# Patient Record
Sex: FEMALE | Race: BLACK OR AFRICAN AMERICAN | Employment: OTHER
[De-identification: names, ages, dates, MRNs, and addresses within clinical notes are randomized per-mention and may not be internally consistent; named-entity substitution may affect disease eponyms.]

---

## 2017-01-30 ENCOUNTER — OFFICE VISIT (OUTPATIENT)
Dept: FAMILY MEDICINE CLINIC | Facility: CLINIC | Age: 57
End: 2017-01-30

## 2017-01-30 VITALS
DIASTOLIC BLOOD PRESSURE: 56 MMHG | HEART RATE: 69 BPM | WEIGHT: 242 LBS | BODY MASS INDEX: 40.32 KG/M2 | SYSTOLIC BLOOD PRESSURE: 108 MMHG | HEIGHT: 65 IN

## 2017-01-30 DIAGNOSIS — E78.2 MIXED HYPERLIPIDEMIA: ICD-10-CM

## 2017-01-30 DIAGNOSIS — G45.3 AMAUROSIS FUGAX OF LEFT EYE: ICD-10-CM

## 2017-01-30 DIAGNOSIS — Z09 HOSPITAL DISCHARGE FOLLOW-UP: ICD-10-CM

## 2017-01-30 DIAGNOSIS — I10 UNCONTROLLED HYPERTENSION: Primary | ICD-10-CM

## 2017-01-30 DIAGNOSIS — E66.09 NON MORBID OBESITY DUE TO EXCESS CALORIES: ICD-10-CM

## 2017-01-30 DIAGNOSIS — K80.20 CALCULUS OF GALLBLADDER WITHOUT CHOLECYSTITIS WITHOUT OBSTRUCTION: ICD-10-CM

## 2017-01-30 DIAGNOSIS — N28.9 RENAL DYSFUNCTION: ICD-10-CM

## 2017-01-30 DIAGNOSIS — G81.94 LEFT HEMIPARESIS (HCC): ICD-10-CM

## 2017-01-30 DIAGNOSIS — R10.84 GENERALIZED ABDOMINAL PAIN: ICD-10-CM

## 2017-01-30 PROCEDURE — 99214 OFFICE O/P EST MOD 30 MIN: CPT | Performed by: FAMILY MEDICINE

## 2017-01-30 RX ORDER — CARVEDILOL 25 MG/1
25 TABLET ORAL
COMMUNITY
Start: 2017-01-25 | End: 2017-05-16 | Stop reason: ALTCHOICE

## 2017-01-30 RX ORDER — HYDROCODONE BITARTRATE AND ACETAMINOPHEN 5; 325 MG/1; MG/1
1 TABLET ORAL
COMMUNITY
Start: 2017-01-25 | End: 2017-02-04

## 2017-01-30 RX ORDER — ONDANSETRON 4 MG/1
4 TABLET, FILM COATED ORAL
COMMUNITY
Start: 2017-01-25 | End: 2017-02-24

## 2017-01-30 ASSESSMENT — PATIENT HEALTH QUESTIONNAIRE - PHQ9
SUM OF ALL RESPONSES TO PHQ9 QUESTIONS 1 & 2: 0
2. FEELING DOWN, DEPRESSED OR HOPELESS: 0
SUM OF ALL RESPONSES TO PHQ QUESTIONS 1-9: 0
1. LITTLE INTEREST OR PLEASURE IN DOING THINGS: 0

## 2017-02-26 ASSESSMENT — ENCOUNTER SYMPTOMS
CHEST TIGHTNESS: 0
SHORTNESS OF BREATH: 0
TROUBLE SWALLOWING: 0
RHINORRHEA: 0
COUGH: 0
DIARRHEA: 0
SORE THROAT: 0
NAUSEA: 0
CONSTIPATION: 0
BACK PAIN: 0
ABDOMINAL PAIN: 1

## 2017-03-30 DIAGNOSIS — M17.0 PRIMARY OSTEOARTHRITIS OF BOTH KNEES: ICD-10-CM

## 2017-03-31 RX ORDER — MELOXICAM 15 MG/1
TABLET ORAL
Qty: 90 TABLET | Refills: 0 | Status: SHIPPED | OUTPATIENT
Start: 2017-03-31 | End: 2018-03-01 | Stop reason: ALTCHOICE

## 2017-05-16 ENCOUNTER — OFFICE VISIT (OUTPATIENT)
Dept: FAMILY MEDICINE CLINIC | Age: 57
End: 2017-05-16
Payer: MEDICAID

## 2017-05-16 VITALS
SYSTOLIC BLOOD PRESSURE: 130 MMHG | DIASTOLIC BLOOD PRESSURE: 70 MMHG | OXYGEN SATURATION: 97 % | BODY MASS INDEX: 37.96 KG/M2 | HEIGHT: 66 IN | WEIGHT: 236.2 LBS | HEART RATE: 77 BPM

## 2017-05-16 DIAGNOSIS — I89.0 LYMPHEDEMA OF BOTH LOWER EXTREMITIES: ICD-10-CM

## 2017-05-16 DIAGNOSIS — R06.09 DYSPNEA ON EXERTION: Primary | ICD-10-CM

## 2017-05-16 DIAGNOSIS — E66.01 MORBID OBESITY DUE TO EXCESS CALORIES (HCC): ICD-10-CM

## 2017-05-16 DIAGNOSIS — R63.5 WEIGHT GAIN: ICD-10-CM

## 2017-05-16 DIAGNOSIS — M17.0 PRIMARY OSTEOARTHRITIS OF BOTH KNEES: ICD-10-CM

## 2017-05-16 DIAGNOSIS — K21.00 GASTROESOPHAGEAL REFLUX DISEASE WITH ESOPHAGITIS: ICD-10-CM

## 2017-05-16 DIAGNOSIS — Z09 HOSPITAL DISCHARGE FOLLOW-UP: ICD-10-CM

## 2017-05-16 DIAGNOSIS — I10 ESSENTIAL HYPERTENSION: ICD-10-CM

## 2017-05-16 DIAGNOSIS — E78.2 MIXED HYPERLIPIDEMIA: ICD-10-CM

## 2017-05-16 LAB — HBA1C MFR BLD: 8.5 %

## 2017-05-16 PROCEDURE — 83036 HEMOGLOBIN GLYCOSYLATED A1C: CPT | Performed by: FAMILY MEDICINE

## 2017-05-16 PROCEDURE — 99214 OFFICE O/P EST MOD 30 MIN: CPT | Performed by: FAMILY MEDICINE

## 2017-05-16 RX ORDER — CARVEDILOL 6.25 MG/1
12.5 TABLET ORAL 2 TIMES DAILY WITH MEALS
COMMUNITY
End: 2022-04-28 | Stop reason: ALTCHOICE

## 2017-05-17 ASSESSMENT — ENCOUNTER SYMPTOMS
CHEST TIGHTNESS: 0
DIARRHEA: 0
NAUSEA: 0
DIFFICULTY BREATHING: 1
ABDOMINAL PAIN: 0
CONSTIPATION: 0
TROUBLE SWALLOWING: 0
COUGH: 1
SORE THROAT: 0
SHORTNESS OF BREATH: 1
RHINORRHEA: 0
BACK PAIN: 0

## 2017-05-18 DIAGNOSIS — M17.0 PRIMARY OSTEOARTHRITIS OF BOTH KNEES: ICD-10-CM

## 2017-05-18 DIAGNOSIS — R26.81 UNSTABLE GAIT: ICD-10-CM

## 2017-05-18 DIAGNOSIS — I89.0 LYMPHEDEMA OF BOTH LOWER EXTREMITIES: Primary | ICD-10-CM

## 2017-08-03 ENCOUNTER — OFFICE VISIT (OUTPATIENT)
Dept: FAMILY MEDICINE CLINIC | Age: 57
End: 2017-08-03
Payer: MEDICARE

## 2017-08-03 ENCOUNTER — TELEPHONE (OUTPATIENT)
Dept: INTERNAL MEDICINE | Age: 57
End: 2017-08-03

## 2017-08-03 VITALS
BODY MASS INDEX: 44.2 KG/M2 | SYSTOLIC BLOOD PRESSURE: 127 MMHG | HEIGHT: 66 IN | HEART RATE: 66 BPM | WEIGHT: 275 LBS | DIASTOLIC BLOOD PRESSURE: 66 MMHG

## 2017-08-03 DIAGNOSIS — I63.9 OCCIPITAL CEREBRAL INFARCTION (HCC): ICD-10-CM

## 2017-08-03 DIAGNOSIS — N28.9 RENAL INSUFFICIENCY: ICD-10-CM

## 2017-08-03 DIAGNOSIS — E78.2 MIXED HYPERLIPIDEMIA: ICD-10-CM

## 2017-08-03 DIAGNOSIS — G63 POLYNEUROPATHY ASSOCIATED WITH UNDERLYING DISEASE (HCC): ICD-10-CM

## 2017-08-03 DIAGNOSIS — R26.81 UNSTABLE GAIT: ICD-10-CM

## 2017-08-03 DIAGNOSIS — K21.00 GASTROESOPHAGEAL REFLUX DISEASE WITH ESOPHAGITIS: ICD-10-CM

## 2017-08-03 DIAGNOSIS — I89.0 LYMPHEDEMA OF BOTH LOWER EXTREMITIES: ICD-10-CM

## 2017-08-03 DIAGNOSIS — I10 ESSENTIAL HYPERTENSION: ICD-10-CM

## 2017-08-03 DIAGNOSIS — E10.3299 TYPE 1 DIABETES MELLITUS WITH MILD NONPROLIFERATIVE RETINOPATHY, MACULAR EDEMA PRESENCE UNSPECIFIED, UNSPECIFIED LATERALITY (HCC): Primary | ICD-10-CM

## 2017-08-03 DIAGNOSIS — R63.5 WEIGHT GAIN: ICD-10-CM

## 2017-08-03 LAB
GLUCOSE BLD-MCNC: 204 MG/DL
HBA1C MFR BLD: 10 %

## 2017-08-03 PROCEDURE — 83036 HEMOGLOBIN GLYCOSYLATED A1C: CPT | Performed by: FAMILY MEDICINE

## 2017-08-03 PROCEDURE — 99214 OFFICE O/P EST MOD 30 MIN: CPT | Performed by: FAMILY MEDICINE

## 2017-08-03 PROCEDURE — 82962 GLUCOSE BLOOD TEST: CPT | Performed by: FAMILY MEDICINE

## 2017-08-03 RX ORDER — PREDNISONE 10 MG/1
TABLET ORAL
COMMUNITY
Start: 2017-07-27 | End: 2017-09-06 | Stop reason: ALTCHOICE

## 2017-08-03 ASSESSMENT — ENCOUNTER SYMPTOMS
TROUBLE SWALLOWING: 0
NAUSEA: 0
SORE THROAT: 0
BACK PAIN: 1
DIARRHEA: 0
CONSTIPATION: 0
COUGH: 0
RHINORRHEA: 0
CHEST TIGHTNESS: 0
ABDOMINAL PAIN: 0
SHORTNESS OF BREATH: 1

## 2017-08-15 DIAGNOSIS — I89.0 LYMPHEDEMA OF BOTH LOWER EXTREMITIES: ICD-10-CM

## 2017-08-15 DIAGNOSIS — M17.0 PRIMARY OSTEOARTHRITIS OF BOTH KNEES: ICD-10-CM

## 2017-08-15 DIAGNOSIS — R26.81 UNSTABLE GAIT: ICD-10-CM

## 2017-08-24 RX ORDER — NIFEDIPINE 60 MG/1
TABLET, EXTENDED RELEASE ORAL
Qty: 60 TABLET | Refills: 0 | Status: SHIPPED | OUTPATIENT
Start: 2017-08-24 | End: 2018-10-10 | Stop reason: ALTCHOICE

## 2017-08-24 RX ORDER — ATORVASTATIN CALCIUM 80 MG/1
TABLET, FILM COATED ORAL
Qty: 30 TABLET | Refills: 0 | Status: SHIPPED | OUTPATIENT
Start: 2017-08-24 | End: 2017-09-21 | Stop reason: SDUPTHER

## 2017-09-05 DIAGNOSIS — I10 UNCONTROLLED HYPERTENSION: ICD-10-CM

## 2017-09-05 RX ORDER — LISINOPRIL 40 MG/1
40 TABLET ORAL DAILY
Qty: 30 TABLET | Refills: 3 | Status: SHIPPED | OUTPATIENT
Start: 2017-09-05 | End: 2017-09-06 | Stop reason: ALTCHOICE

## 2017-09-06 ENCOUNTER — OFFICE VISIT (OUTPATIENT)
Dept: FAMILY MEDICINE CLINIC | Age: 57
End: 2017-09-06
Payer: MEDICARE

## 2017-09-06 VITALS
BODY MASS INDEX: 42.59 KG/M2 | HEART RATE: 69 BPM | DIASTOLIC BLOOD PRESSURE: 74 MMHG | WEIGHT: 255.6 LBS | SYSTOLIC BLOOD PRESSURE: 132 MMHG | HEIGHT: 65 IN

## 2017-09-06 DIAGNOSIS — M17.0 PRIMARY OSTEOARTHRITIS OF BOTH KNEES: ICD-10-CM

## 2017-09-06 DIAGNOSIS — I63.9 OCCIPITAL CEREBRAL INFARCTION (HCC): ICD-10-CM

## 2017-09-06 DIAGNOSIS — E78.2 MIXED HYPERLIPIDEMIA: ICD-10-CM

## 2017-09-06 DIAGNOSIS — E66.01 MORBID (SEVERE) OBESITY DUE TO EXCESS CALORIES (HCC): ICD-10-CM

## 2017-09-06 DIAGNOSIS — Z79.4 TYPE 2 DIABETES MELLITUS WITH MILD NONPROLIFERATIVE RETINOPATHY OF BOTH EYES, WITH LONG-TERM CURRENT USE OF INSULIN, MACULAR EDEMA PRESENCE UNSPECIFIED (HCC): Primary | ICD-10-CM

## 2017-09-06 DIAGNOSIS — E11.3293 TYPE 2 DIABETES MELLITUS WITH MILD NONPROLIFERATIVE RETINOPATHY OF BOTH EYES, WITH LONG-TERM CURRENT USE OF INSULIN, MACULAR EDEMA PRESENCE UNSPECIFIED (HCC): Primary | ICD-10-CM

## 2017-09-06 DIAGNOSIS — I89.0 LYMPHEDEMA OF BOTH LOWER EXTREMITIES: ICD-10-CM

## 2017-09-06 DIAGNOSIS — E11.42 POLYNEUROPATHY DUE TO TYPE 2 DIABETES MELLITUS (HCC): ICD-10-CM

## 2017-09-06 DIAGNOSIS — N28.9 RENAL INSUFFICIENCY: ICD-10-CM

## 2017-09-06 DIAGNOSIS — I10 ESSENTIAL HYPERTENSION: ICD-10-CM

## 2017-09-06 PROBLEM — Z01.810 ENCOUNTER FOR PREPROCEDURAL CARDIOVASCULAR EXAMINATION: Status: ACTIVE | Noted: 2017-02-20

## 2017-09-06 PROBLEM — R06.02 BREATH SHORTNESS: Status: ACTIVE | Noted: 2017-02-21

## 2017-09-06 PROBLEM — E11.9 TYPE 2 DIABETES MELLITUS WITHOUT COMPLICATION (HCC): Status: ACTIVE | Noted: 2017-05-07

## 2017-09-06 PROBLEM — I67.82 TEMPORARY CEREBRAL VASCULAR DYSFUNCTION: Status: ACTIVE | Noted: 2017-07-19

## 2017-09-06 PROBLEM — R73.9 BLOOD GLUCOSE ELEVATED: Status: ACTIVE | Noted: 2017-05-07

## 2017-09-06 PROBLEM — R53.1 WEAKNESS OF LEFT SIDE OF BODY: Status: ACTIVE | Noted: 2017-01-30

## 2017-09-06 PROBLEM — B00.9 HERPES SIMPLEX TYPE 2 INFECTION: Status: ACTIVE | Noted: 2017-08-15

## 2017-09-06 PROBLEM — N18.9 CHRONIC KIDNEY DISEASE: Status: ACTIVE | Noted: 2017-08-03

## 2017-09-06 PROBLEM — R47.01: Status: ACTIVE | Noted: 2017-05-07

## 2017-09-06 PROBLEM — A59.01 VAGINAL TRICHOMONIASIS: Status: ACTIVE | Noted: 2017-08-15

## 2017-09-06 PROBLEM — Z01.419 ENCOUNTER FOR GYNECOLOGICAL EXAMINATION WITHOUT ABNORMAL FINDING: Status: ACTIVE | Noted: 2017-08-15

## 2017-09-06 PROBLEM — I11.9 BENIGN HYPERTENSIVE HEART DISEASE: Status: ACTIVE | Noted: 2017-08-15

## 2017-09-06 PROBLEM — R74.8 ABNORMAL LIVER ENZYMES: Status: ACTIVE | Noted: 2017-08-16

## 2017-09-06 PROBLEM — R55 EPISODE OF SYNCOPE: Status: ACTIVE | Noted: 2017-01-22

## 2017-09-06 PROCEDURE — 99214 OFFICE O/P EST MOD 30 MIN: CPT | Performed by: FAMILY MEDICINE

## 2017-09-06 RX ORDER — ERGOCALCIFEROL 1.25 MG/1
50000 CAPSULE ORAL WEEKLY
COMMUNITY
Start: 2017-08-25 | End: 2021-02-10

## 2017-09-06 RX ORDER — NIFEDIPINE 90 MG/1
90 TABLET, EXTENDED RELEASE ORAL
COMMUNITY
End: 2017-09-06 | Stop reason: ALTCHOICE

## 2017-09-06 RX ORDER — FUROSEMIDE 20 MG/1
TABLET ORAL
COMMUNITY
Start: 2017-08-24 | End: 2018-03-01 | Stop reason: DRUGHIGH

## 2017-09-06 RX ORDER — LANOLIN ALCOHOL/MO/W.PET/CERES
CREAM (GRAM) TOPICAL
COMMUNITY
Start: 2017-08-24 | End: 2017-12-07 | Stop reason: SDUPTHER

## 2017-09-06 RX ORDER — SPIRONOLACTONE 25 MG/1
25 TABLET ORAL DAILY
COMMUNITY
Start: 2017-08-24 | End: 2021-06-03 | Stop reason: ALTCHOICE

## 2017-09-06 RX ORDER — LISINOPRIL 40 MG/1
40 TABLET ORAL DAILY
COMMUNITY
End: 2021-02-10

## 2017-09-06 RX ORDER — FAMOTIDINE 40 MG/1
TABLET, FILM COATED ORAL
COMMUNITY
Start: 2017-08-24 | End: 2018-10-10 | Stop reason: DRUGHIGH

## 2017-09-13 PROBLEM — I63.9 OCCIPITAL CEREBRAL INFARCTION (HCC): Status: ACTIVE | Noted: 2017-09-13

## 2017-09-13 PROBLEM — E11.3293 TYPE 2 DIABETES MELLITUS WITH MILD NONPROLIFERATIVE RETINOPATHY OF BOTH EYES, WITH LONG-TERM CURRENT USE OF INSULIN (HCC): Status: ACTIVE | Noted: 2017-09-13

## 2017-09-13 PROBLEM — N28.9 RENAL INSUFFICIENCY: Status: ACTIVE | Noted: 2017-09-13

## 2017-09-13 PROBLEM — E11.42 POLYNEUROPATHY DUE TO TYPE 2 DIABETES MELLITUS (HCC): Status: ACTIVE | Noted: 2017-09-13

## 2017-09-13 PROBLEM — Z79.4 TYPE 2 DIABETES MELLITUS WITH MILD NONPROLIFERATIVE RETINOPATHY OF BOTH EYES, WITH LONG-TERM CURRENT USE OF INSULIN (HCC): Status: ACTIVE | Noted: 2017-09-13

## 2017-09-13 ASSESSMENT — ENCOUNTER SYMPTOMS
SORE THROAT: 0
BACK PAIN: 0
RHINORRHEA: 0
NAUSEA: 0
COUGH: 0
ABDOMINAL PAIN: 0
DIARRHEA: 0
CONSTIPATION: 0
CHEST TIGHTNESS: 0
TROUBLE SWALLOWING: 0
SHORTNESS OF BREATH: 1

## 2017-09-21 RX ORDER — ATORVASTATIN CALCIUM 80 MG/1
TABLET, FILM COATED ORAL
Qty: 30 TABLET | Refills: 0 | Status: SHIPPED | OUTPATIENT
Start: 2017-09-21 | End: 2017-10-26 | Stop reason: SDUPTHER

## 2017-10-26 DIAGNOSIS — I10 UNCONTROLLED HYPERTENSION: ICD-10-CM

## 2017-10-26 RX ORDER — CLONIDINE HYDROCHLORIDE 0.3 MG/1
TABLET ORAL
Qty: 60 TABLET | Refills: 1 | Status: SHIPPED | OUTPATIENT
Start: 2017-10-26 | End: 2017-12-22 | Stop reason: SDUPTHER

## 2017-10-26 RX ORDER — ATORVASTATIN CALCIUM 80 MG/1
TABLET, FILM COATED ORAL
Qty: 30 TABLET | Refills: 0 | Status: SHIPPED | OUTPATIENT
Start: 2017-10-26 | End: 2017-11-24 | Stop reason: SDUPTHER

## 2017-10-26 NOTE — TELEPHONE ENCOUNTER
Requested Prescriptions     Pending Prescriptions Disp Refills    cloNIDine (CATAPRES) 0.3 MG tablet [Pharmacy Med Name: CLONIDINE HCL 0.3 MG TABLET] 60 tablet 1     Sig: TAKE 1 TABLET 2 TIMES A DAY     Next Visit Date:  Future Appointments  Date Time Provider Jack Shah   12/6/2017 11:00 AM Christine Falk MD [address] Maintenance   Topic Date Due    Colon cancer screen colonoscopy  09/16/2010    Cervical cancer screen  12/01/2016    Diabetic foot exam  03/29/2017    Diabetic microalbuminuria test  03/29/2017    Diabetic hemoglobin A1C test  11/03/2017    Lipid screen  05/16/2018 (Originally 3/29/2017)    DTaP/Tdap/Td vaccine (1 - Tdap) 05/16/2018 (Originally 9/16/1979)    Hepatitis C screen  05/16/2018 (Originally 1960)    HIV screen  05/16/2018 (Originally 9/16/1975)    Flu vaccine (1) 08/03/2018 (Originally 9/1/2017)    Breast cancer screen  03/29/2018    Diabetic retinal exam  09/19/2018    Pneumococcal med risk  Completed       Hemoglobin A1C (%)   Date Value   08/03/2017 10.0 (H)   05/16/2017 8.5   11/28/2016 10.6             ( goal A1C is < 7)   Microalb/Crt.  Ratio (mcg/mg creat)   Date Value   04/04/2015 3,460     LDL Cholesterol (mg/dL)   Date Value   04/04/2015            (goal LDL is <100)   AST (U/L)   Date Value   04/04/2015 19     ALT (U/L)   Date Value   04/04/2015 24     BUN (mg/dL)   Date Value   04/04/2015 14     BP Readings from Last 3 Encounters:   09/06/17 132/74   08/03/17 127/66   05/16/17 130/70          (goal 120/80)    All Future Testing planned in [facility]  Lab Frequency Next Occurrence               Patient Active Problem List:     Malignant neoplasm of uterus (HCC)     Acid reflux     Heart murmur     Herpesvirus infection     Adiposity     Type 2 diabetes mellitus treated with insulin (HCC)     Microalbuminuria     Abnormal ECG     Tendinitis of wrist     Noncompliance with treatment     Osteoarthritis of knee     Hypokalemia

## 2017-11-24 NOTE — TELEPHONE ENCOUNTER
Impaired vision in both eyes     Lymphedema of leg     Episode of hypertension     Primary osteoarthritis of both knees     Bad memory     Combined fat and carbohydrate induced hyperlipemia     Unsteady gait     Abnormal kidney function     Calculus of gallbladder     Weakness of left side of body     AF (amaurosis fugax)     Morbid (severe) obesity due to excess calories ([address]     Encounter for follow-up examination after completed treatment for conditions other than malignant neoplasm     Weight gain     Chronic kidney disease     Polyneuropathy associated with underlying disease (HCC)     Essential hypertension, malignant     Benign hypertensive heart disease     Abnormal liver enzymes     Encounter for gynecological examination without abnormal finding     Encounter for preprocedural cardiovascular examination     Ataxic aphasia     Herpes simplex type 2 infection     Blood glucose elevated     Accelerated hypertension     Breath shortness     Episode of syncope     Temporary cerebral vascular dysfunction     Moderate or severe vision impairment, both eyes     Vaginal trichomoniasis     Mixed hyperlipidemia     Essential hypertension     Type 2 diabetes mellitus with mild nonproliferative retinopathy of both eyes, with long-term current use of insulin (HCC)     Renal insufficiency     Occipital cerebral infarction ([address]     Polyneuropathy due to type 2 diabetes mellitus ([address]

## 2017-11-29 RX ORDER — ATORVASTATIN CALCIUM 80 MG/1
TABLET, FILM COATED ORAL
Qty: 30 TABLET | Refills: 0 | Status: SHIPPED | OUTPATIENT
Start: 2017-11-29 | End: 2018-01-01 | Stop reason: SDUPTHER

## 2017-12-07 ENCOUNTER — OFFICE VISIT (OUTPATIENT)
Dept: FAMILY MEDICINE CLINIC | Age: 57
End: 2017-12-07
Payer: MEDICARE

## 2017-12-07 VITALS
DIASTOLIC BLOOD PRESSURE: 81 MMHG | HEIGHT: 65 IN | BODY MASS INDEX: 44.89 KG/M2 | HEART RATE: 69 BPM | SYSTOLIC BLOOD PRESSURE: 150 MMHG | WEIGHT: 269.4 LBS

## 2017-12-07 DIAGNOSIS — Z79.4 TYPE 2 DIABETES MELLITUS TREATED WITH INSULIN (HCC): Primary | ICD-10-CM

## 2017-12-07 DIAGNOSIS — E11.9 TYPE 2 DIABETES MELLITUS TREATED WITH INSULIN (HCC): Primary | ICD-10-CM

## 2017-12-07 DIAGNOSIS — I10 ESSENTIAL HYPERTENSION: ICD-10-CM

## 2017-12-07 DIAGNOSIS — E66.01 MORBID (SEVERE) OBESITY DUE TO EXCESS CALORIES (HCC): ICD-10-CM

## 2017-12-07 DIAGNOSIS — R63.5 WEIGHT GAIN: ICD-10-CM

## 2017-12-07 DIAGNOSIS — M17.0 PRIMARY OSTEOARTHRITIS OF BOTH KNEES: ICD-10-CM

## 2017-12-07 DIAGNOSIS — R26.81 UNSTEADY GAIT: ICD-10-CM

## 2017-12-07 DIAGNOSIS — N28.9 RENAL INSUFFICIENCY: ICD-10-CM

## 2017-12-07 DIAGNOSIS — I63.9 OCCIPITAL CEREBRAL INFARCTION (HCC): ICD-10-CM

## 2017-12-07 DIAGNOSIS — E78.2 MIXED HYPERLIPIDEMIA: ICD-10-CM

## 2017-12-07 DIAGNOSIS — K21.00 GASTROESOPHAGEAL REFLUX DISEASE WITH ESOPHAGITIS: ICD-10-CM

## 2017-12-07 PROBLEM — G47.10 HYPERSOMNOLENCE: Status: ACTIVE | Noted: 2017-09-13

## 2017-12-07 PROBLEM — T50.901A DRUG OVERDOSE: Status: ACTIVE | Noted: 2017-10-04

## 2017-12-07 LAB — HBA1C MFR BLD: 10.4 %

## 2017-12-07 PROCEDURE — 1036F TOBACCO NON-USER: CPT | Performed by: FAMILY MEDICINE

## 2017-12-07 PROCEDURE — 3017F COLORECTAL CA SCREEN DOC REV: CPT | Performed by: FAMILY MEDICINE

## 2017-12-07 PROCEDURE — G8427 DOCREV CUR MEDS BY ELIG CLIN: HCPCS | Performed by: FAMILY MEDICINE

## 2017-12-07 PROCEDURE — G8417 CALC BMI ABV UP PARAM F/U: HCPCS | Performed by: FAMILY MEDICINE

## 2017-12-07 PROCEDURE — 3014F SCREEN MAMMO DOC REV: CPT | Performed by: FAMILY MEDICINE

## 2017-12-07 PROCEDURE — 99214 OFFICE O/P EST MOD 30 MIN: CPT | Performed by: FAMILY MEDICINE

## 2017-12-07 PROCEDURE — G8484 FLU IMMUNIZE NO ADMIN: HCPCS | Performed by: FAMILY MEDICINE

## 2017-12-07 PROCEDURE — 83036 HEMOGLOBIN GLYCOSYLATED A1C: CPT | Performed by: FAMILY MEDICINE

## 2017-12-07 PROCEDURE — 3046F HEMOGLOBIN A1C LEVEL >9.0%: CPT | Performed by: FAMILY MEDICINE

## 2017-12-07 PROCEDURE — G8598 ASA/ANTIPLAT THER USED: HCPCS | Performed by: FAMILY MEDICINE

## 2017-12-07 RX ORDER — TIMOLOL MALEATE 5 MG/ML
SOLUTION/ DROPS OPHTHALMIC
COMMUNITY
Start: 2017-12-04 | End: 2018-10-15

## 2017-12-07 RX ORDER — MAGNESIUM OXIDE 400 MG/1
400 TABLET ORAL
COMMUNITY
Start: 2017-08-24 | End: 2018-03-01 | Stop reason: SDUPTHER

## 2017-12-07 RX ORDER — LATANOPROST 50 UG/ML
1 SOLUTION/ DROPS OPHTHALMIC DAILY
COMMUNITY
Start: 2017-12-04

## 2017-12-07 ASSESSMENT — ENCOUNTER SYMPTOMS
CONSTIPATION: 0
CHEST TIGHTNESS: 0
SHORTNESS OF BREATH: 0
SORE THROAT: 0
ABDOMINAL PAIN: 0
BACK PAIN: 1
DIARRHEA: 0
RHINORRHEA: 0
COUGH: 0
NAUSEA: 0
TROUBLE SWALLOWING: 0

## 2017-12-07 NOTE — PROGRESS NOTES
questions answered. Pt voiced understanding. 5.  Reviewed prior labs and health maintenance  6. Continue current medications, diet and exercise. More than 50% of the 25 minutes was spent in counseling patient and evaluating plan of treatment.     Completed Refills   Requested Prescriptions      No prescriptions requested or ordered in this encounter     Electronically signed by Christine Falk MD on 12/7/2017 at 1:09 PM

## 2017-12-07 NOTE — PATIENT INSTRUCTIONS
you are having a problem with your medicine. You will get more details on the specific medicines your doctor prescribes. · Check your blood sugar as often as your doctor recommends. It is important to keep track of any symptoms you have, such as low blood sugar. Also tell your doctor if you have any changes in your activities, diet, or insulin use. · Talk to your doctor before you start taking aspirin every day. Aspirin can help certain people lower their risk of a heart attack or stroke. But taking aspirin isn't right for everyone, because it can cause serious bleeding. · Do not smoke. If you need help quitting, talk to your doctor about stop-smoking programs and medicines. These can increase your chances of quitting for good. · Keep your cholesterol and blood pressure at normal levels. You may need to take one or more medicines to reach your goals. Take them exactly as directed. Do not stop or change a medicine without talking to your doctor first.  When should you call for help? Call 911 anytime you think you may need emergency care. For example, call if:  ? · You passed out (lost consciousness), or you suddenly become very sleepy or confused. (You may have very low blood sugar.)   ? Call your doctor now or seek immediate medical care if:  ? · Your blood sugar is 300 mg/dL or is higher than the level your doctor has set for you. ? · You have symptoms of low blood sugar, such as:  ¨ Sweating. ¨ Feeling nervous, shaky, and weak. ¨ Extreme hunger and slight nausea. ¨ Dizziness and headache. ¨ Blurred vision. ¨ Confusion. ? Watch closely for changes in your health, and be sure to contact your doctor if:  ? · You often have problems controlling your blood sugar. ? · You have symptoms of long-term diabetes problems, such as:  ¨ New vision changes. ¨ New pain, numbness, or tingling in your hands or feet. ¨ Skin problems. Where can you learn more? Go to [email or web address] and sign

## 2017-12-22 DIAGNOSIS — I10 UNCONTROLLED HYPERTENSION: ICD-10-CM

## 2017-12-22 RX ORDER — CLONIDINE HYDROCHLORIDE 0.3 MG/1
TABLET ORAL
Qty: 60 TABLET | Refills: 1 | Status: SHIPPED | OUTPATIENT
Start: 2017-12-22 | End: 2018-02-17 | Stop reason: SDUPTHER

## 2017-12-22 NOTE — TELEPHONE ENCOUNTER
Hypokalemia     Impaired vision in both eyes     Lymphedema of leg     Episode of hypertension     Primary osteoarthritis of both knees     Bad memory     Combined fat and carbohydrate induced hyperlipemia     Unsteady gait     Abnormal kidney function     Calculus of gallbladder     Weakness of left side of body     AF (amaurosis fugax)     Morbid (severe) obesity due to excess calories ([address]     Encounter for follow-up examination after completed treatment for conditions other than malignant neoplasm     Weight gain     Chronic kidney disease     Polyneuropathy associated with underlying disease (HCC)     Essential hypertension, malignant     Benign hypertensive heart disease     Abnormal liver enzymes     Encounter for gynecological examination without abnormal finding     Encounter for preprocedural cardiovascular examination     Ataxic aphasia     Herpes simplex type 2 infection     Blood glucose elevated     Accelerated hypertension     Breath shortness     Episode of syncope     Temporary cerebral vascular dysfunction     Moderate or severe vision impairment, both eyes     Vaginal trichomoniasis     Mixed hyperlipidemia     Essential hypertension     Type 2 diabetes mellitus without complication (HCC)     Renal insufficiency     Occipital cerebral infarction (HCC)     Polyneuropathy due to type 2 diabetes mellitus ([address]     Diabetes mellitus with ketoacidosis     Drug overdose     Hypersomnolence     Ischemic stroke ([address]

## 2018-01-02 RX ORDER — ATORVASTATIN CALCIUM 80 MG/1
TABLET, FILM COATED ORAL
Qty: 30 TABLET | Refills: 0 | Status: SHIPPED | OUTPATIENT
Start: 2018-01-02 | End: 2018-01-25 | Stop reason: SDUPTHER

## 2018-01-25 RX ORDER — ATORVASTATIN CALCIUM 80 MG/1
TABLET, FILM COATED ORAL
Qty: 30 TABLET | Refills: 0 | Status: SHIPPED | OUTPATIENT
Start: 2018-01-25 | End: 2018-02-13 | Stop reason: SDUPTHER

## 2018-02-13 RX ORDER — ATORVASTATIN CALCIUM 80 MG/1
TABLET, FILM COATED ORAL
Qty: 30 TABLET | Refills: 10 | Status: SHIPPED | OUTPATIENT
Start: 2018-02-13 | End: 2019-09-16 | Stop reason: SDUPTHER

## 2018-02-17 DIAGNOSIS — I10 UNCONTROLLED HYPERTENSION: ICD-10-CM

## 2018-02-19 RX ORDER — CLONIDINE HYDROCHLORIDE 0.3 MG/1
TABLET ORAL
Qty: 60 TABLET | Refills: 1 | Status: SHIPPED | OUTPATIENT
Start: 2018-02-19 | End: 2018-10-10 | Stop reason: DRUGHIGH

## 2018-03-01 ENCOUNTER — OFFICE VISIT (OUTPATIENT)
Dept: FAMILY MEDICINE CLINIC | Age: 58
End: 2018-03-01
Payer: MEDICARE

## 2018-03-01 VITALS
DIASTOLIC BLOOD PRESSURE: 77 MMHG | BODY MASS INDEX: 44.55 KG/M2 | HEIGHT: 65 IN | HEART RATE: 64 BPM | SYSTOLIC BLOOD PRESSURE: 136 MMHG | WEIGHT: 267.4 LBS

## 2018-03-01 DIAGNOSIS — G63 POLYNEUROPATHY ASSOCIATED WITH UNDERLYING DISEASE (HCC): ICD-10-CM

## 2018-03-01 DIAGNOSIS — Z79.4 TYPE 2 DIABETES MELLITUS WITHOUT COMPLICATION, WITH LONG-TERM CURRENT USE OF INSULIN (HCC): Primary | ICD-10-CM

## 2018-03-01 DIAGNOSIS — I63.9 ISCHEMIC STROKE (HCC): ICD-10-CM

## 2018-03-01 DIAGNOSIS — I10 ESSENTIAL HYPERTENSION: ICD-10-CM

## 2018-03-01 DIAGNOSIS — E66.01 MORBID (SEVERE) OBESITY DUE TO EXCESS CALORIES (HCC): ICD-10-CM

## 2018-03-01 DIAGNOSIS — E11.9 TYPE 2 DIABETES MELLITUS WITHOUT COMPLICATION, WITH LONG-TERM CURRENT USE OF INSULIN (HCC): Primary | ICD-10-CM

## 2018-03-01 PROBLEM — E78.49 FAMILIAL HYPERLIPIDEMIA: Status: ACTIVE | Noted: 2017-09-06

## 2018-03-01 PROCEDURE — 99213 OFFICE O/P EST LOW 20 MIN: CPT | Performed by: PHYSICIAN ASSISTANT

## 2018-03-01 RX ORDER — ASPIRIN 81 MG/1
81 TABLET, CHEWABLE ORAL DAILY
COMMUNITY
Start: 2018-02-09

## 2018-03-01 RX ORDER — ERGOCALCIFEROL (VITAMIN D2) 1250 MCG
50000 CAPSULE ORAL WEEKLY
COMMUNITY
End: 2018-03-01 | Stop reason: ALTCHOICE

## 2018-03-01 RX ORDER — GLYCERIN .002; .002; .01 MG/MG; MG/MG; MG/MG
SOLUTION/ DROPS OPHTHALMIC
COMMUNITY
Start: 2018-02-09

## 2018-03-01 RX ORDER — DOCUSATE SODIUM 100 MG/1
100 CAPSULE, LIQUID FILLED ORAL 2 TIMES DAILY
COMMUNITY
End: 2018-03-01 | Stop reason: ALTCHOICE

## 2018-03-01 RX ORDER — EVOLOCUMAB 140 MG/ML
INJECTION, SOLUTION SUBCUTANEOUS
COMMUNITY
Start: 2018-01-15 | End: 2022-03-09

## 2018-03-01 RX ORDER — ACETAMINOPHEN 500 MG
500 TABLET ORAL 2 TIMES DAILY
COMMUNITY
Start: 2018-02-09 | End: 2018-03-01 | Stop reason: ALTCHOICE

## 2018-03-01 RX ORDER — FUROSEMIDE 40 MG/1
40 TABLET ORAL 2 TIMES DAILY
Refills: 3 | COMMUNITY
Start: 2018-01-11 | End: 2018-06-04 | Stop reason: DRUGHIGH

## 2018-03-01 RX ORDER — NIFEDIPINE 60 MG/1
60 TABLET, FILM COATED, EXTENDED RELEASE ORAL 2 TIMES DAILY
COMMUNITY
Start: 2018-02-09 | End: 2018-10-10 | Stop reason: ALTCHOICE

## 2018-03-01 RX ORDER — MULTIVIT WITH MINERALS/LUTEIN
1000 TABLET ORAL DAILY
COMMUNITY
Start: 2018-02-09

## 2018-03-01 NOTE — PROGRESS NOTES
[address]  [facility] [address]  Dept: [phone]  Dept Fax: [phone]    Subjective:  Antonina Butcher presents for follow up of chronic medical problems. COMPLAINT AND PRESENT HISTORY     Patient is here for follow-up for high blood pressure and diabetes, requesting orders for compression stockings and bedside commode. Patient is followed by nephrology, endocrinology, hematology, ophthalmology. Weight weight loss of 2 pounds. Diabetes   She presents for her follow-up diabetic visit. She has type 2 diabetes mellitus. Her disease course has been improving. There are no hypoglycemic associated symptoms. Pertinent negatives for hypoglycemia include no headaches. Pertinent negatives for diabetes include no chest pain, no polydipsia, no polyphagia and no polyuria. There are no hypoglycemic complications. Diabetic complications include nephropathy, peripheral neuropathy and retinopathy. Risk factors for coronary artery disease include obesity, hypertension and dyslipidemia. Current diabetic treatment includes insulin injections. An ACE inhibitor/angiotensin II receptor blocker is being taken. Eye exam is current. Hypertension   This is a chronic problem. The problem is controlled. Pertinent negatives include no chest pain, headaches, neck pain or shortness of breath. Risk factors for coronary artery disease include diabetes mellitus and obesity. Past treatments include ACE inhibitors, alpha 1 blockers, beta blockers and diuretics. The current treatment provides moderate improvement. Hypertensive end-organ damage includes kidney disease and retinopathy. Identifiable causes of hypertension include chronic renal disease.        Patient Active Problem List   Diagnosis    Malignant neoplasm of uterus ([address]    Acid reflux    Heart murmur    Adiposity    Type 2 diabetes mellitus treated with insulin (HCC)    Microalbuminuria    Abnormal ECG    Respiratory: Negative for shortness of breath. Cardiovascular: Negative for chest pain. Gastrointestinal: Negative for nausea and vomiting. Musculoskeletal: Negative for back pain and neck pain. Neurological: Negative for headaches. Endo/Heme/Allergies: Negative for polydipsia and polyphagia. /77   Pulse 64   Ht 5' 5\" (1.651 m)   Wt 267 lb 6.4 oz (121.3 kg)   LMP  (LMP Unknown)   Breastfeeding? No   BMI 44.50 kg/m²     GENERAL PHYSICAL EXAM     Physical Exam   Constitutional: She is oriented to person, place, and time and well-developed, well-nourished, and in no distress. HENT:   Head: Normocephalic and atraumatic. Eyes: Pupils are equal, round, and reactive to light. Cardiovascular: Normal rate, regular rhythm and normal heart sounds. Pulmonary/Chest: Effort normal and breath sounds normal.   Abdominal: Soft. She exhibits no mass. There is no tenderness. Musculoskeletal: Normal range of motion. She exhibits no edema. Neurological: She is alert and oriented to person, place, and time. Gait normal.   Skin: Skin is warm and dry. Assessment     1. Type 2 diabetes mellitus without complication, with long-term current use of insulin ([address]     2. Essential hypertension  Compression Stockings MISC   3. Polyneuropathy associated with underlying disease ([address]     4. Morbid (severe) obesity due to excess calories ([address]     5. Ischemic stroke ([address]  Misc. Devices (COMMODE BEDSIDE) MISC    DISCONTINUED: Misc. Devices (COMMODE BEDSIDE) [address] [facility] discussion with patient, Patient is here for follow-up for diabetes, high blood pressure, requesting order for bedside commode and compression stockings. Patient is followed by nephrology, ophthalmology, endocrinology, hematology. Patient A1c has decreased from 10.4 to 8.4. Findings were explained, blood pressure stable, physical exam unremarkable.     Plan of treatment, discussed diabetes, high blood pressure, neuropathy, obesity. Informed patient she will be given order for compression stockings and bedside commode  Discussed weight loss and the correlation with lowering A1c. Informed patient there will be no changes in her medications, continue with current treatments. Instructed patient to follow up with nephrology, hematology, endocrinology, ophthalmology. Informed patient since she is getting lab work done by endocrinology, she is will not to be sent for lab work. Her medications were reviewed, no changes. Labs reviewed. We will see patient back in about 3 months or earlier if any problems. No orders of the defined types were placed in this encounter. Outpatient Encounter Prescriptions as of 3/1/2018   Medication Sig Dispense Refill    aspirin 81 MG chewable tablet Take 81 mg by mouth daily      REPATHA SURECLICK 626 MG/ML SOAJ       furosemide (LASIX) 40 MG tablet Take 40 mg by mouth 2 times daily  3    ARTIFICIAL TEARS 0.2-0.2-1 % SOLN       Ascorbic Acid (VITAMIN C) 1000 MG tablet Take 1,000 mg by mouth daily      magnesium oxide (MAG-OX) 400 (241.3 Mg) MG TABS tablet Take 400 mg by mouth daily  3    NIFEdipine (ADALAT CC) 60 MG extended release tablet Take 60 mg by mouth 2 times daily      timolol (BETIMOL) 0.5 % ophthalmic solution Apply 0.5 % to eye daily      Compression Stockings MISC Provide compressions stocking covered by insurance 1 each 0    Misc.  Devices (COMMODE BEDSIDE) [facility] Provide bedside commode covered by insurance 1 each 0    cloNIDine (CATAPRES) 0.3 MG tablet TAKE 1 TABLET 2 TIMES A DAY 60 tablet 1    atorvastatin (LIPITOR) 80 MG tablet TAKE (1) TABLET BY MOUTH EVERY DAY AT BEDTIME 30 tablet 10    insulin lispro prot & lispro (HUMALOG 50/50) (50-50) 100 UNIT per ML SUPN injection pen Inject into the skin      latanoprost (XALATAN) 0.005 % ophthalmic solution       timolol (TIMOPTIC) 0.5 % ophthalmic solution       spironolactone (ALDACTONE) 25 MG tablet       famotidine (VASCEPA) 1 G CAPS capsule Take 2 capsules by mouth 2 times daily      [DISCONTINUED] aspirin 81 MG tablet Take 81 mg by mouth daily. No facility-administered encounter medications on file as of 3/1/2018. Medications, laboratory testing, imaging, consultation, and follow up as documented in this encounter.      Electronically signed by Leni Conti PA-C on 3/1/18 at 11:43 AM

## 2018-03-03 PROBLEM — R63.5 WEIGHT GAIN: Status: RESOLVED | Noted: 2017-05-16 | Resolved: 2018-03-03

## 2018-03-03 PROBLEM — G63 POLYNEUROPATHY ASSOCIATED WITH UNDERLYING DISEASE (HCC): Status: RESOLVED | Noted: 2017-08-03 | Resolved: 2018-03-03

## 2018-03-04 ASSESSMENT — ENCOUNTER SYMPTOMS
NAUSEA: 0
VOMITING: 0
BACK PAIN: 0
SHORTNESS OF BREATH: 0

## 2018-03-20 DIAGNOSIS — N18.9 CHRONIC KIDNEY DISEASE, UNSPECIFIED CKD STAGE: ICD-10-CM

## 2018-03-20 DIAGNOSIS — I89.0 LYMPHEDEMA: ICD-10-CM

## 2018-03-20 NOTE — TELEPHONE ENCOUNTER
Requested Prescriptions     Pending Prescriptions Disp Refills    Compression Stockings MISC 1 each 0     Sig: Provide compressions stocking covered by insurance    Misc. Devices (COMMODE BEDSIDE) MISC 1 each 0     Sig: Provide bedside commode covered by insurance     Next Visit Date:  Future Appointments  Date Time Provider Jack Shah   6/4/2018 9:00 AM Yoko Obregon PA-C [address] Maintenance   Topic Date Due    Colon cancer screen colonoscopy  09/16/2010    Potassium monitoring  04/04/2016    Creatinine monitoring  04/04/2016    Cervical cancer screen  12/01/2016    Diabetic foot exam  03/29/2017    Diabetic microalbuminuria test  03/29/2017    A1C test (Diabetic or Prediabetic)  03/07/2018    Breast cancer screen  03/29/2018    Lipid screen  05/16/2018 (Originally 3/29/2017)    DTaP/Tdap/Td vaccine (1 - Tdap) 05/16/2018 (Originally 9/16/1979)    Hepatitis C screen  05/16/2018 (Originally 1960)    HIV screen  05/16/2018 (Originally 9/16/1975)    Flu vaccine (1) 08/03/2018 (Originally 9/1/2017)    Shingles Vaccine (1 of 2 - 2 Dose Series) 03/01/2019 (Originally 9/16/2010)    Diabetic retinal exam  12/01/2018    Pneumococcal med risk  Completed       Hemoglobin A1C (%)   Date Value   12/07/2017 10.4 (H)   08/03/2017 10.0 (H)   05/16/2017 8.5             ( goal A1C is < 7)   Microalb/Crt.  Ratio (mcg/mg creat)   Date Value   04/04/2015 3,460     LDL Cholesterol (mg/dL)   Date Value   04/04/2015            (goal LDL is <100)   AST (U/L)   Date Value   04/04/2015 19     ALT (U/L)   Date Value   04/04/2015 24     BUN (mg/dL)   Date Value   04/04/2015 14     BP Readings from Last 3 Encounters:   03/01/18 136/77   12/07/17 (!) 150/81   09/06/17 132/74          (goal 120/80)    All Future Testing planned in [facility]  Lab Frequency Next Occurrence               Patient Active Problem List:     Malignant neoplasm of uterus (HCC)     Acid reflux     Heart murmur Adiposity     Type 2 diabetes mellitus treated with insulin (HCC)     Microalbuminuria     Abnormal ECG     Tendinitis of wrist     Noncompliance with treatment     Hypokalemia     Lymphedema of leg     Episode of hypertension     Primary osteoarthritis of both knees     Bad memory     Combined fat and carbohydrate induced hyperlipemia     Unsteady gait     Abnormal kidney function     Calculus of gallbladder     Weakness of left side of body     AF (amaurosis fugax)     Morbid (severe) obesity due to excess calories ([address]     Encounter for follow-up examination after completed treatment for conditions other than malignant neoplasm     Chronic kidney disease     Essential hypertension, malignant     Benign hypertensive heart disease     Abnormal liver enzymes     Encounter for gynecological examination without abnormal finding     Encounter for preprocedural cardiovascular examination     Ataxic aphasia     Herpes simplex type 2 infection     Blood glucose elevated     Accelerated hypertension     Breath shortness     Episode of syncope     Temporary cerebral vascular dysfunction     Moderate or severe vision impairment, both eyes     Vaginal trichomoniasis     Familial hyperlipidemia     Essential hypertension     Type 2 diabetes mellitus without complication (HCC)     Renal insufficiency     Occipital cerebral infarction (HCC)     Polyneuropathy due to type 2 diabetes mellitus ([address]     Diabetes mellitus with ketoacidosis     Drug overdose     Hypersomnolence     Ischemic stroke ([address]

## 2018-03-21 DIAGNOSIS — N18.9 CHRONIC KIDNEY DISEASE, UNSPECIFIED CKD STAGE: ICD-10-CM

## 2018-05-11 ENCOUNTER — TELEPHONE (OUTPATIENT)
Dept: FAMILY MEDICINE CLINIC | Age: 58
End: 2018-05-11

## 2018-06-04 ENCOUNTER — OFFICE VISIT (OUTPATIENT)
Dept: FAMILY MEDICINE CLINIC | Age: 58
End: 2018-06-04
Payer: MEDICARE

## 2018-06-04 VITALS
HEIGHT: 65 IN | BODY MASS INDEX: 43.95 KG/M2 | WEIGHT: 263.8 LBS | SYSTOLIC BLOOD PRESSURE: 92 MMHG | DIASTOLIC BLOOD PRESSURE: 60 MMHG | HEART RATE: 61 BPM

## 2018-06-04 DIAGNOSIS — Z79.4 TYPE 2 DIABETES MELLITUS TREATED WITH INSULIN (HCC): Primary | ICD-10-CM

## 2018-06-04 DIAGNOSIS — E11.42 POLYNEUROPATHY DUE TO TYPE 2 DIABETES MELLITUS (HCC): ICD-10-CM

## 2018-06-04 DIAGNOSIS — E78.49 FAMILIAL HYPERLIPIDEMIA: ICD-10-CM

## 2018-06-04 DIAGNOSIS — I10 ESSENTIAL HYPERTENSION: ICD-10-CM

## 2018-06-04 DIAGNOSIS — I89.0 LYMPHEDEMA: ICD-10-CM

## 2018-06-04 DIAGNOSIS — Z12.11 SCREENING FOR COLON CANCER: ICD-10-CM

## 2018-06-04 DIAGNOSIS — E66.01 MORBID (SEVERE) OBESITY DUE TO EXCESS CALORIES (HCC): ICD-10-CM

## 2018-06-04 DIAGNOSIS — Z12.31 ENCOUNTER FOR SCREENING MAMMOGRAM FOR BREAST CANCER: ICD-10-CM

## 2018-06-04 DIAGNOSIS — E11.9 TYPE 2 DIABETES MELLITUS TREATED WITH INSULIN (HCC): Primary | ICD-10-CM

## 2018-06-04 PROBLEM — N28.9 RENAL INSUFFICIENCY: Status: RESOLVED | Noted: 2017-09-13 | Resolved: 2018-06-04

## 2018-06-04 PROBLEM — A59.01 VAGINAL TRICHOMONIASIS: Status: RESOLVED | Noted: 2017-08-15 | Resolved: 2018-06-04

## 2018-06-04 PROBLEM — R73.9 BLOOD GLUCOSE ELEVATED: Status: RESOLVED | Noted: 2017-05-07 | Resolved: 2018-06-04

## 2018-06-04 PROBLEM — I11.9 BENIGN HYPERTENSIVE HEART DISEASE: Status: RESOLVED | Noted: 2017-08-15 | Resolved: 2018-06-04

## 2018-06-04 LAB — HBA1C MFR BLD: 9.6 %

## 2018-06-04 PROCEDURE — 3046F HEMOGLOBIN A1C LEVEL >9.0%: CPT | Performed by: PHYSICIAN ASSISTANT

## 2018-06-04 PROCEDURE — 99214 OFFICE O/P EST MOD 30 MIN: CPT | Performed by: PHYSICIAN ASSISTANT

## 2018-06-04 PROCEDURE — 1036F TOBACCO NON-USER: CPT | Performed by: PHYSICIAN ASSISTANT

## 2018-06-04 PROCEDURE — 2022F DILAT RTA XM EVC RTNOPTHY: CPT | Performed by: PHYSICIAN ASSISTANT

## 2018-06-04 PROCEDURE — 3017F COLORECTAL CA SCREEN DOC REV: CPT | Performed by: PHYSICIAN ASSISTANT

## 2018-06-04 PROCEDURE — G8598 ASA/ANTIPLAT THER USED: HCPCS | Performed by: PHYSICIAN ASSISTANT

## 2018-06-04 PROCEDURE — G8427 DOCREV CUR MEDS BY ELIG CLIN: HCPCS | Performed by: PHYSICIAN ASSISTANT

## 2018-06-04 PROCEDURE — G8417 CALC BMI ABV UP PARAM F/U: HCPCS | Performed by: PHYSICIAN ASSISTANT

## 2018-06-04 PROCEDURE — 83036 HEMOGLOBIN GLYCOSYLATED A1C: CPT | Performed by: PHYSICIAN ASSISTANT

## 2018-06-04 RX ORDER — FUROSEMIDE 20 MG/1
80 TABLET ORAL 2 TIMES DAILY
COMMUNITY
Start: 2018-03-05 | End: 2021-06-03 | Stop reason: DRUGHIGH

## 2018-06-04 RX ORDER — ACETAMINOPHEN 500 MG
TABLET ORAL
Refills: 3 | COMMUNITY
Start: 2018-05-22 | End: 2021-02-10

## 2018-06-04 ASSESSMENT — ENCOUNTER SYMPTOMS
BACK PAIN: 0
CONSTIPATION: 1
COUGH: 1
NAUSEA: 0
DIARRHEA: 0
SHORTNESS OF BREATH: 0
VOMITING: 0

## 2018-06-04 ASSESSMENT — PATIENT HEALTH QUESTIONNAIRE - PHQ9
SUM OF ALL RESPONSES TO PHQ QUESTIONS 1-9: 0
2. FEELING DOWN, DEPRESSED OR HOPELESS: 0
1. LITTLE INTEREST OR PLEASURE IN DOING THINGS: 0
SUM OF ALL RESPONSES TO PHQ9 QUESTIONS 1 & 2: 0

## 2018-09-10 ENCOUNTER — TELEPHONE (OUTPATIENT)
Dept: FAMILY MEDICINE CLINIC | Age: 58
End: 2018-09-10

## 2018-09-26 PROBLEM — Z09 ENCOUNTER FOR FOLLOW-UP EXAMINATION AFTER COMPLETED TREATMENT FOR CONDITIONS OTHER THAN MALIGNANT NEOPLASM: Status: RESOLVED | Noted: 2017-05-16 | Resolved: 2018-09-26

## 2018-09-26 PROBLEM — Z01.419 ENCOUNTER FOR GYNECOLOGICAL EXAMINATION WITHOUT ABNORMAL FINDING: Status: RESOLVED | Noted: 2017-08-15 | Resolved: 2018-09-26

## 2018-09-26 PROBLEM — Z01.810 ENCOUNTER FOR PREPROCEDURAL CARDIOVASCULAR EXAMINATION: Status: RESOLVED | Noted: 2017-02-20 | Resolved: 2018-09-26

## 2018-10-03 ENCOUNTER — CARE COORDINATION (OUTPATIENT)
Dept: CARE COORDINATION | Age: 58
End: 2018-10-03

## 2018-10-10 ENCOUNTER — CARE COORDINATION (OUTPATIENT)
Dept: CARE COORDINATION | Age: 58
End: 2018-10-10

## 2018-10-10 RX ORDER — FAMOTIDINE 20 MG/1
20 TABLET, FILM COATED ORAL DAILY
COMMUNITY
End: 2018-10-15 | Stop reason: SDUPTHER

## 2018-10-10 RX ORDER — LIDOCAINE 50 MG/G
1 PATCH TOPICAL DAILY
COMMUNITY
End: 2022-06-16 | Stop reason: ALTCHOICE

## 2018-10-10 RX ORDER — DOCUSATE SODIUM 100 MG/1
100 CAPSULE, LIQUID FILLED ORAL 2 TIMES DAILY
COMMUNITY

## 2018-10-10 RX ORDER — AMLODIPINE BESYLATE 5 MG/1
5 TABLET ORAL DAILY
COMMUNITY
End: 2018-10-15 | Stop reason: SDUPTHER

## 2018-10-10 RX ORDER — CLONIDINE HYDROCHLORIDE 0.2 MG/1
0.2 TABLET ORAL 2 TIMES DAILY
COMMUNITY
End: 2018-10-15 | Stop reason: SDUPTHER

## 2018-10-10 RX ORDER — FENOFIBRATE 67 MG/1
67 CAPSULE ORAL
COMMUNITY
End: 2018-10-15 | Stop reason: SDUPTHER

## 2018-10-10 RX ORDER — OMEGA-3-ACID ETHYL ESTERS 1 G/1
2 CAPSULE, LIQUID FILLED ORAL 2 TIMES DAILY
COMMUNITY
End: 2022-03-09

## 2018-10-15 ENCOUNTER — OFFICE VISIT (OUTPATIENT)
Dept: FAMILY MEDICINE CLINIC | Age: 58
End: 2018-10-15
Payer: MEDICARE

## 2018-10-15 VITALS
OXYGEN SATURATION: 100 % | DIASTOLIC BLOOD PRESSURE: 67 MMHG | HEART RATE: 68 BPM | WEIGHT: 254 LBS | RESPIRATION RATE: 20 BRPM | HEIGHT: 65 IN | SYSTOLIC BLOOD PRESSURE: 149 MMHG | BODY MASS INDEX: 42.32 KG/M2 | TEMPERATURE: 97.8 F

## 2018-10-15 DIAGNOSIS — E78.49 FAMILIAL HYPERLIPIDEMIA: ICD-10-CM

## 2018-10-15 DIAGNOSIS — E66.01 CLASS 3 SEVERE OBESITY DUE TO EXCESS CALORIES WITH SERIOUS COMORBIDITY AND BODY MASS INDEX (BMI) OF 40.0 TO 44.9 IN ADULT (HCC): ICD-10-CM

## 2018-10-15 DIAGNOSIS — E11.9 TYPE 2 DIABETES MELLITUS TREATED WITH INSULIN (HCC): ICD-10-CM

## 2018-10-15 DIAGNOSIS — I10 ESSENTIAL HYPERTENSION: ICD-10-CM

## 2018-10-15 DIAGNOSIS — I63.9 CEREBROVASCULAR ACCIDENT (CVA), UNSPECIFIED MECHANISM (HCC): Primary | ICD-10-CM

## 2018-10-15 DIAGNOSIS — Z79.4 TYPE 2 DIABETES MELLITUS TREATED WITH INSULIN (HCC): ICD-10-CM

## 2018-10-15 DIAGNOSIS — K21.00 GASTROESOPHAGEAL REFLUX DISEASE WITH ESOPHAGITIS: ICD-10-CM

## 2018-10-15 DIAGNOSIS — R53.1 RIGHT SIDED WEAKNESS: ICD-10-CM

## 2018-10-15 PROCEDURE — 99214 OFFICE O/P EST MOD 30 MIN: CPT | Performed by: PHYSICIAN ASSISTANT

## 2018-10-15 RX ORDER — CLONIDINE HYDROCHLORIDE 0.2 MG/1
0.2 TABLET ORAL 2 TIMES DAILY
Qty: 60 TABLET | Refills: 2 | Status: SHIPPED | OUTPATIENT
Start: 2018-10-15 | End: 2019-01-02 | Stop reason: SDUPTHER

## 2018-10-15 RX ORDER — FENOFIBRATE 67 MG/1
67 CAPSULE ORAL
Qty: 30 CAPSULE | Refills: 2 | Status: SHIPPED | OUTPATIENT
Start: 2018-10-15 | End: 2018-12-04 | Stop reason: SDUPTHER

## 2018-10-15 RX ORDER — AMLODIPINE BESYLATE 5 MG/1
5 TABLET ORAL DAILY
Qty: 30 TABLET | Refills: 2 | Status: SHIPPED | OUTPATIENT
Start: 2018-10-15 | End: 2018-12-04 | Stop reason: SDUPTHER

## 2018-10-15 RX ORDER — FAMOTIDINE 20 MG/1
20 TABLET, FILM COATED ORAL DAILY
Qty: 60 TABLET | Refills: 1 | Status: SHIPPED | OUTPATIENT
Start: 2018-10-15 | End: 2019-01-14 | Stop reason: SDUPTHER

## 2018-10-15 RX ORDER — ZINC GLUCONATE 13.3 MG
1 LOZENGE ORAL DAILY
Qty: 30 TABLET | Refills: 3 | Status: SHIPPED | OUTPATIENT
Start: 2018-10-15 | End: 2019-01-08 | Stop reason: SDUPTHER

## 2018-10-15 NOTE — PROGRESS NOTES
0    Misc. Devices (WALL GRAB BAR) MISC Use every time patient gets in/or out of the shower. Please dispense insurance approved wall grab bar. 1 each 0    carvedilol (COREG) 25 MG tablet Take 25 mg by mouth 2 times daily (with meals)      ONETOUCH DELICA LANCETS 22E MISC       ONE TOUCH ULTRA TEST strip       Blood Pressure Monitoring (B-D ASSURE BPM/AUTO WRIST CUFF) MISC As directed. Labile hypertension. 1 each 0    B-D ULTRAFINE III SHORT PEN 31G X 8 MM MISC       Liraglutide (VICTOZA) 18 MG/3ML SOPN SC injection Inject 1.2 mg into the skin daily      insulin regular human (HUMULIN R U-500 KWIKPEN) 500 UNIT/ML SOPN concentrated injection pen Inject 80 Units into the skin Daily with supper Daily with dinner      insulin regular human (HUMULIN R U-500 KWIKPEN) 500 UNIT/ML SOPN concentrated injection pen Inject 15 Units into the skin daily [facility] records      insulin regular human (HUMULIN R U-500 KWIKPEN) 500 UNIT/ML SOPN concentrated injection pen Inject 90 Units into the skin daily (with breakfast) [facility] records       No current facility-administered medications for this visit. Social History   Substance Use Topics    Smoking status: Never Smoker    Smokeless tobacco: Never Used    Alcohol use No       Family History   Problem Relation Age of Onset    Heart Disease Mother     Diabetes Mother     Cancer Mother         breast    Cancer Father         colon    Heart Disease Father     High Blood Pressure Father         REVIEW OFSYSTEMS:  Review of Systems   Constitutional: Negative for chills and fever. HENT: Negative for congestion. Respiratory: Negative for cough and shortness of breath. Cardiovascular: Negative for chest pain. Gastrointestinal: Negative for constipation, diarrhea, nausea and vomiting. Genitourinary: Negative for dysuria, frequency and urgency. Musculoskeletal: Negative for back pain, myalgias and neck pain.    Neurological: Positive for speech

## 2018-10-17 PROBLEM — R06.02 BREATH SHORTNESS: Status: RESOLVED | Noted: 2017-02-21 | Resolved: 2018-10-17

## 2018-10-17 ASSESSMENT — ENCOUNTER SYMPTOMS
DIARRHEA: 0
BACK PAIN: 0
CONSTIPATION: 0
SHORTNESS OF BREATH: 0
VOMITING: 0
NAUSEA: 0
COUGH: 0

## 2018-10-24 ENCOUNTER — CARE COORDINATION (OUTPATIENT)
Dept: CARE COORDINATION | Age: 58
End: 2018-10-24

## 2018-10-31 ENCOUNTER — CARE COORDINATION (OUTPATIENT)
Dept: CARE COORDINATION | Age: 58
End: 2018-10-31

## 2018-12-04 DIAGNOSIS — I10 ESSENTIAL HYPERTENSION: ICD-10-CM

## 2018-12-04 DIAGNOSIS — E78.49 FAMILIAL HYPERLIPIDEMIA: ICD-10-CM

## 2018-12-05 RX ORDER — FENOFIBRATE 67 MG/1
67 CAPSULE ORAL
Qty: 90 CAPSULE | Refills: 0 | Status: SHIPPED | OUTPATIENT
Start: 2018-12-05 | End: 2019-03-21 | Stop reason: SDUPTHER

## 2018-12-05 RX ORDER — AMLODIPINE BESYLATE 5 MG/1
5 TABLET ORAL DAILY
Qty: 90 TABLET | Refills: 0 | Status: SHIPPED | OUTPATIENT
Start: 2018-12-05 | End: 2019-03-21 | Stop reason: SDUPTHER

## 2019-01-02 DIAGNOSIS — I10 ESSENTIAL HYPERTENSION: ICD-10-CM

## 2019-01-02 RX ORDER — CLONIDINE HYDROCHLORIDE 0.2 MG/1
0.2 TABLET ORAL 2 TIMES DAILY
Qty: 60 TABLET | Refills: 1 | Status: SHIPPED | OUTPATIENT
Start: 2019-01-02 | End: 2019-01-14 | Stop reason: SDUPTHER

## 2019-01-08 RX ORDER — ZINC GLUCONATE 13.3 MG
1 LOZENGE ORAL DAILY
Qty: 30 TABLET | Refills: 3 | Status: SHIPPED | OUTPATIENT
Start: 2019-01-08 | End: 2019-01-14 | Stop reason: SDUPTHER

## 2019-01-14 ENCOUNTER — OFFICE VISIT (OUTPATIENT)
Dept: PRIMARY CARE CLINIC | Age: 59
End: 2019-01-14
Payer: MEDICARE

## 2019-01-14 VITALS
TEMPERATURE: 97.2 F | HEART RATE: 63 BPM | SYSTOLIC BLOOD PRESSURE: 137 MMHG | BODY MASS INDEX: 43.15 KG/M2 | OXYGEN SATURATION: 100 % | DIASTOLIC BLOOD PRESSURE: 64 MMHG | HEIGHT: 65 IN | WEIGHT: 259 LBS | RESPIRATION RATE: 20 BRPM

## 2019-01-14 DIAGNOSIS — E66.01 CLASS 3 SEVERE OBESITY DUE TO EXCESS CALORIES WITH SERIOUS COMORBIDITY AND BODY MASS INDEX (BMI) OF 40.0 TO 44.9 IN ADULT (HCC): ICD-10-CM

## 2019-01-14 DIAGNOSIS — Z72.89 OTHER PROBLEMS RELATED TO LIFESTYLE: ICD-10-CM

## 2019-01-14 DIAGNOSIS — Z86.73 HISTORY OF CVA (CEREBROVASCULAR ACCIDENT): ICD-10-CM

## 2019-01-14 DIAGNOSIS — Z79.4 TYPE 2 DIABETES MELLITUS TREATED WITH INSULIN (HCC): ICD-10-CM

## 2019-01-14 DIAGNOSIS — I10 ESSENTIAL HYPERTENSION: Primary | ICD-10-CM

## 2019-01-14 DIAGNOSIS — E11.9 TYPE 2 DIABETES MELLITUS TREATED WITH INSULIN (HCC): ICD-10-CM

## 2019-01-14 DIAGNOSIS — K21.9 GASTROESOPHAGEAL REFLUX DISEASE, ESOPHAGITIS PRESENCE NOT SPECIFIED: ICD-10-CM

## 2019-01-14 DIAGNOSIS — K21.00 GASTROESOPHAGEAL REFLUX DISEASE WITH ESOPHAGITIS: ICD-10-CM

## 2019-01-14 DIAGNOSIS — R26.81 UNSTEADY GAIT: ICD-10-CM

## 2019-01-14 PROCEDURE — G8598 ASA/ANTIPLAT THER USED: HCPCS | Performed by: PHYSICIAN ASSISTANT

## 2019-01-14 PROCEDURE — 99214 OFFICE O/P EST MOD 30 MIN: CPT | Performed by: PHYSICIAN ASSISTANT

## 2019-01-14 PROCEDURE — 3017F COLORECTAL CA SCREEN DOC REV: CPT | Performed by: PHYSICIAN ASSISTANT

## 2019-01-14 PROCEDURE — G8427 DOCREV CUR MEDS BY ELIG CLIN: HCPCS | Performed by: PHYSICIAN ASSISTANT

## 2019-01-14 PROCEDURE — 3046F HEMOGLOBIN A1C LEVEL >9.0%: CPT | Performed by: PHYSICIAN ASSISTANT

## 2019-01-14 PROCEDURE — G8417 CALC BMI ABV UP PARAM F/U: HCPCS | Performed by: PHYSICIAN ASSISTANT

## 2019-01-14 PROCEDURE — G8484 FLU IMMUNIZE NO ADMIN: HCPCS | Performed by: PHYSICIAN ASSISTANT

## 2019-01-14 PROCEDURE — 2022F DILAT RTA XM EVC RTNOPTHY: CPT | Performed by: PHYSICIAN ASSISTANT

## 2019-01-14 PROCEDURE — 1036F TOBACCO NON-USER: CPT | Performed by: PHYSICIAN ASSISTANT

## 2019-01-14 RX ORDER — ZINC GLUCONATE 13.3 MG
1 LOZENGE ORAL DAILY
Qty: 30 TABLET | Refills: 3 | Status: SHIPPED | OUTPATIENT
Start: 2019-01-14

## 2019-01-14 RX ORDER — CLONIDINE HYDROCHLORIDE 0.2 MG/1
0.2 TABLET ORAL 2 TIMES DAILY
Qty: 60 TABLET | Refills: 2 | Status: SHIPPED | OUTPATIENT
Start: 2019-01-14 | End: 2019-05-13 | Stop reason: SDUPTHER

## 2019-01-14 ASSESSMENT — ENCOUNTER SYMPTOMS: CONSTIPATION: 1

## 2019-01-15 RX ORDER — FAMOTIDINE 20 MG/1
20 TABLET, FILM COATED ORAL DAILY
Qty: 60 TABLET | Refills: 1 | Status: SHIPPED | OUTPATIENT
Start: 2019-01-15 | End: 2019-07-18 | Stop reason: SDUPTHER

## 2019-01-20 ENCOUNTER — TELEPHONE (OUTPATIENT)
Dept: PRIMARY CARE CLINIC | Age: 59
End: 2019-01-20

## 2019-01-20 ASSESSMENT — ENCOUNTER SYMPTOMS
SHORTNESS OF BREATH: 0
DIARRHEA: 0
BACK PAIN: 0
NAUSEA: 0
VOMITING: 0
COUGH: 0
WHEEZING: 0

## 2019-03-25 LAB
AVERAGE GLUCOSE: 161
HBA1C MFR BLD: 9.6 %

## 2019-05-13 ENCOUNTER — OFFICE VISIT (OUTPATIENT)
Dept: PRIMARY CARE CLINIC | Age: 59
End: 2019-05-13
Payer: MEDICARE

## 2019-05-13 VITALS
SYSTOLIC BLOOD PRESSURE: 152 MMHG | DIASTOLIC BLOOD PRESSURE: 77 MMHG | OXYGEN SATURATION: 98 % | BODY MASS INDEX: 43.99 KG/M2 | RESPIRATION RATE: 20 BRPM | HEIGHT: 65 IN | TEMPERATURE: 97.5 F | HEART RATE: 79 BPM | WEIGHT: 264 LBS

## 2019-05-13 DIAGNOSIS — E66.01 CLASS 3 SEVERE OBESITY DUE TO EXCESS CALORIES WITH SERIOUS COMORBIDITY AND BODY MASS INDEX (BMI) OF 40.0 TO 44.9 IN ADULT (HCC): ICD-10-CM

## 2019-05-13 DIAGNOSIS — E11.42 TYPE 2 DIABETES MELLITUS WITH DIABETIC POLYNEUROPATHY, WITH LONG-TERM CURRENT USE OF INSULIN (HCC): ICD-10-CM

## 2019-05-13 DIAGNOSIS — Z79.4 TYPE 2 DIABETES MELLITUS WITH DIABETIC POLYNEUROPATHY, WITH LONG-TERM CURRENT USE OF INSULIN (HCC): ICD-10-CM

## 2019-05-13 DIAGNOSIS — I10 ESSENTIAL HYPERTENSION: Primary | ICD-10-CM

## 2019-05-13 PROCEDURE — 2022F DILAT RTA XM EVC RTNOPTHY: CPT | Performed by: PHYSICIAN ASSISTANT

## 2019-05-13 PROCEDURE — 3017F COLORECTAL CA SCREEN DOC REV: CPT | Performed by: PHYSICIAN ASSISTANT

## 2019-05-13 PROCEDURE — G8427 DOCREV CUR MEDS BY ELIG CLIN: HCPCS | Performed by: PHYSICIAN ASSISTANT

## 2019-05-13 PROCEDURE — G8598 ASA/ANTIPLAT THER USED: HCPCS | Performed by: PHYSICIAN ASSISTANT

## 2019-05-13 PROCEDURE — G8417 CALC BMI ABV UP PARAM F/U: HCPCS | Performed by: PHYSICIAN ASSISTANT

## 2019-05-13 PROCEDURE — 3046F HEMOGLOBIN A1C LEVEL >9.0%: CPT | Performed by: PHYSICIAN ASSISTANT

## 2019-05-13 PROCEDURE — 1036F TOBACCO NON-USER: CPT | Performed by: PHYSICIAN ASSISTANT

## 2019-05-13 PROCEDURE — 99214 OFFICE O/P EST MOD 30 MIN: CPT | Performed by: PHYSICIAN ASSISTANT

## 2019-05-13 RX ORDER — CLONIDINE HYDROCHLORIDE 0.2 MG/1
0.2 TABLET ORAL 2 TIMES DAILY
Qty: 60 TABLET | Refills: 3 | Status: SHIPPED | OUTPATIENT
Start: 2019-05-13 | End: 2019-09-16 | Stop reason: SDUPTHER

## 2019-05-13 ASSESSMENT — PATIENT HEALTH QUESTIONNAIRE - PHQ9
1. LITTLE INTEREST OR PLEASURE IN DOING THINGS: 0
2. FEELING DOWN, DEPRESSED OR HOPELESS: 0
SUM OF ALL RESPONSES TO PHQ9 QUESTIONS 1 & 2: 0
SUM OF ALL RESPONSES TO PHQ QUESTIONS 1-9: 0
SUM OF ALL RESPONSES TO PHQ QUESTIONS 1-9: 0

## 2019-05-13 ASSESSMENT — ENCOUNTER SYMPTOMS
WHEEZING: 1
CONSTIPATION: 1
SHORTNESS OF BREATH: 1

## 2019-05-13 NOTE — PATIENT INSTRUCTIONS
Luis Alberto Zavala MD  [facility] [address] [facility], [address] [place] [address]  [phone]

## 2019-05-13 NOTE — PROGRESS NOTES
[facility]  [address]  Dept: [phone]  Dept Fax: [phone]    Office Progress/Follow Up Note  Date of patient's visit: 5/13/2019  Patient's Name:  Romain Otero YOB: 1960            Patient Care Team:  Yao Benítez PA-C as PCP - General  Yao Benítez PA-C as PCP - S Attributed Provider  ================================================================    REASON FOR VISIT/CHIEF COMPLAINT:  Hypertension    HISTORY OF PRESENTING ILLNESS:  History was obtained from: patient, family member - brother. Romain Otero is a 62 y.o. is here for follow-up for diabetes, high blood pressure. Patient brother is present and helps with history. Patient states she's compliant with medications. Patient is seen by endocrine for diabetes, stable, states she will be seen again in 8/2019. Patient was recently seen by GI for constipation in 4/2019, was started on fiber, symptoms have improved. Patient blood pressure is elevated in office. Pt has 40% compliance with amlodipine, 56% compliance with clonidine, 52% with compliance for carvedilol, 44% compliance with lasix, 23% compliance with lisinopril, and 29% compliance with spironolactone. Discuss elevated blood pressure, increase risk of a my occurrence, increase risk of stroke reoccurrence, importance of medication compliance in depth with patient. Patient has gained 5 lbs. Discuss weight loss in depth with patient, encouraged patient to make changes in diet. Patient is requesting medication refill. [address] gap diagnosis reviewed, stable. Labs reviewed, inform patient lab work will be ordering to have completed before follow-up appointment. Health maintenance reviewed. medications reviewed, refill clonidine 0.2 mg.       HPI    Patient Active Problem List   Diagnosis    Malignant neoplasm of uterus (HCC)    Acid reflux    Heart murmur    Class 3 severe obesity due to excess calories with serious comorbidity and body mass index (BMI) of 40.0 to 44.9 in adult [facility])    Type 2 diabetes mellitus treated with insulin (HCC)    Microalbuminuria    Abnormal ECG    Noncompliance with treatment    Hypokalemia    Lymphedema of leg    Primary osteoarthritis of both knees    Bad memory    Unsteady gait    Calculus of gallbladder    Weakness of left side of body    AF (amaurosis fugax)    Chronic kidney disease    Abnormal liver enzymes    Ataxic aphasia    Herpes simplex type 2 infection    Episode of syncope    Temporary cerebral vascular dysfunction    Moderate or severe vision impairment, both eyes    Familial hyperlipidemia    Essential hypertension    Occipital cerebral infarction ([address]    Drug overdose    Hypersomnolence    Ischemic stroke [facility])       Health Maintenance Due   Topic Date Due    Hepatitis C screen  1960    HIV screen  09/16/1975    Hepatitis B Vaccine (1 of 3 - Risk 3-dose series) 09/16/1979    Shingles Vaccine (1 of 2) 09/16/2010    Potassium monitoring  04/04/2016    Creatinine monitoring  04/04/2016    Diabetic foot exam  03/29/2017    Diabetic microalbuminuria test  03/29/2017    Lipid screen  03/29/2017    Breast cancer screen  03/29/2018       No Known Allergies      Current Outpatient Medications   Medication Sig Dispense Refill    cloNIDine (CATAPRES) 0.2 MG tablet Take 1 tablet by mouth 2 times daily 60 tablet 3    amLODIPine (NORVASC) 5 MG tablet Take 1 tablet by mouth daily 90 tablet 1    fenofibrate micronized (LOFIBRA) 67 MG capsule Take 1 capsule by mouth every morning (before breakfast) 90 capsule 1    famotidine (PEPCID) 20 MG tablet Take 1 tablet by mouth daily 60 tablet 1    Multiple Vitamins-Minerals (THERAGRAN-M ADVANCED 50 PLUS) TABS Take 1 tablet by mouth daily 30 tablet 3    magnesium oxide (MAG-OX) 400 (241.3 Mg) MG TABS tablet Take 1 tablet by mouth daily 30 tablet 2    omega-3 acid Constitutional: She is oriented to person, place, and time. She appears well-developed and well-nourished. She is cooperative. HENT:   Head: Normocephalic and atraumatic. Right Ear: External ear normal.   Left Ear: External ear normal.   Nose: Nose normal.   Eyes: Pupils are equal, round, and reactive to light. Conjunctivae and lids are normal.   Cardiovascular: Normal rate, regular rhythm and normal heart sounds. Pulmonary/Chest: Effort normal and breath sounds normal.   Abdominal: Soft. She exhibits no mass. There is no tenderness. Musculoskeletal: She exhibits no tenderness. Neurological: She is alert and oriented to person, place, and time. She displays atrophy (Rt upper extremity). Skin: Skin is warm and dry. Vitals reviewed. DIAGNOSTIC FINDINGS:  CBC:  Lab Results   Component Value Date    WBC 5.4 04/04/2015    HGB 14.2 04/04/2015     04/04/2015       BMP:    Lab Results   Component Value Date     04/04/2015    K 4.2 04/04/2015    CL 96 04/04/2015    CO2 25 04/04/2015    BUN 14 04/04/2015    CREATININE 0.60 04/04/2015    GLUCOSE 204 08/03/2017       HEMOGLOBIN A1C:   Lab Results   Component Value Date    LABA1C 9.6 03/25/2019       FASTING LIPID PANEL:  Lab Results   Component Value Date    CHOL 291 (H) 04/04/2015    HDL 42 04/04/2015    TRIG 502 (H) 04/04/2015       ASSESSMENT AND PLAN:  Jacqualine Galeazzi was seen today for hypertension. Diagnoses and all orders for this visit:    Essential hypertension  -     cloNIDine (CATAPRES) 0.2 MG tablet; Take 1 tablet by mouth 2 times daily    Type 2 diabetes mellitus with diabetic polyneuropathy, with long-term current use of insulin (HCC)  -     Comprehensive Metabolic Panel; Future  -     CBC;  Future    Class 3 severe obesity due to excess calories with serious comorbidity and body mass index (BMI) of 40.0 to 44.9 in adult ([facility] [address]      FOLLOW UP AND INSTRUCTIONS:  Return in about 4 months (around 9/13/2019) for HTN, HM, Lab Review. · [facility] counseling on the following healthy behaviors:nutrition    · Discussed use, benefit, and side effects of prescribed medications. Barriers to medication compliance addressed. All patient questions answered. Pt voiced understanding. · Patient given educational materials - see patient instructions    Electronically signed by Brant Fischer PA-C on 5/13/19 at 11:55 AM    This note is created with the assistance of a speech-recognition program. While intendingto generate a document that actually reflects the content of the visit, the document can still have some mistakes which may not have been identified and corrected by editing.

## 2019-05-21 PROBLEM — E66.01 MORBID (SEVERE) OBESITY DUE TO EXCESS CALORIES (HCC): Status: RESOLVED | Noted: 2017-05-16 | Resolved: 2019-05-21

## 2019-05-21 PROBLEM — E11.42 POLYNEUROPATHY DUE TO TYPE 2 DIABETES MELLITUS (HCC): Status: RESOLVED | Noted: 2017-09-13 | Resolved: 2019-05-21

## 2019-05-21 ASSESSMENT — ENCOUNTER SYMPTOMS
DIARRHEA: 0
BACK PAIN: 0
NAUSEA: 0
VOMITING: 0
COUGH: 0

## 2019-09-16 ENCOUNTER — OFFICE VISIT (OUTPATIENT)
Dept: PRIMARY CARE CLINIC | Age: 59
End: 2019-09-16
Payer: MEDICARE

## 2019-09-16 VITALS
OXYGEN SATURATION: 99 % | RESPIRATION RATE: 20 BRPM | HEIGHT: 65 IN | BODY MASS INDEX: 42.32 KG/M2 | WEIGHT: 254 LBS | DIASTOLIC BLOOD PRESSURE: 70 MMHG | HEART RATE: 83 BPM | SYSTOLIC BLOOD PRESSURE: 128 MMHG | TEMPERATURE: 97.2 F

## 2019-09-16 DIAGNOSIS — E11.9 ENCOUNTER FOR DIABETIC FOOT EXAM (HCC): ICD-10-CM

## 2019-09-16 DIAGNOSIS — Z86.73 HISTORY OF CVA (CEREBROVASCULAR ACCIDENT): ICD-10-CM

## 2019-09-16 DIAGNOSIS — E66.01 CLASS 3 SEVERE OBESITY DUE TO EXCESS CALORIES WITH SERIOUS COMORBIDITY AND BODY MASS INDEX (BMI) OF 40.0 TO 44.9 IN ADULT (HCC): ICD-10-CM

## 2019-09-16 DIAGNOSIS — E78.49 FAMILIAL HYPERLIPIDEMIA: ICD-10-CM

## 2019-09-16 DIAGNOSIS — I10 ESSENTIAL HYPERTENSION: Primary | ICD-10-CM

## 2019-09-16 PROCEDURE — 3046F HEMOGLOBIN A1C LEVEL >9.0%: CPT | Performed by: PHYSICIAN ASSISTANT

## 2019-09-16 PROCEDURE — 3017F COLORECTAL CA SCREEN DOC REV: CPT | Performed by: PHYSICIAN ASSISTANT

## 2019-09-16 PROCEDURE — 2022F DILAT RTA XM EVC RTNOPTHY: CPT | Performed by: PHYSICIAN ASSISTANT

## 2019-09-16 PROCEDURE — G8427 DOCREV CUR MEDS BY ELIG CLIN: HCPCS | Performed by: PHYSICIAN ASSISTANT

## 2019-09-16 PROCEDURE — G8598 ASA/ANTIPLAT THER USED: HCPCS | Performed by: PHYSICIAN ASSISTANT

## 2019-09-16 PROCEDURE — G8417 CALC BMI ABV UP PARAM F/U: HCPCS | Performed by: PHYSICIAN ASSISTANT

## 2019-09-16 PROCEDURE — 1036F TOBACCO NON-USER: CPT | Performed by: PHYSICIAN ASSISTANT

## 2019-09-16 PROCEDURE — 99214 OFFICE O/P EST MOD 30 MIN: CPT | Performed by: PHYSICIAN ASSISTANT

## 2019-09-16 RX ORDER — CLONIDINE HYDROCHLORIDE 0.2 MG/1
0.2 TABLET ORAL 2 TIMES DAILY
Qty: 60 TABLET | Refills: 3 | Status: SHIPPED | OUTPATIENT
Start: 2019-09-16 | End: 2020-02-17 | Stop reason: SDUPTHER

## 2019-09-16 RX ORDER — ATORVASTATIN CALCIUM 80 MG/1
80 TABLET, FILM COATED ORAL NIGHTLY
Qty: 30 TABLET | Refills: 3 | Status: SHIPPED | OUTPATIENT
Start: 2019-09-16 | End: 2019-12-13 | Stop reason: SDUPTHER

## 2019-09-16 RX ORDER — AMLODIPINE BESYLATE 5 MG/1
5 TABLET ORAL DAILY
Qty: 90 TABLET | Refills: 1 | Status: SHIPPED | OUTPATIENT
Start: 2019-09-16 | End: 2020-02-17 | Stop reason: SDUPTHER

## 2019-09-16 ASSESSMENT — ENCOUNTER SYMPTOMS: ABDOMINAL PAIN: 1

## 2019-10-04 ENCOUNTER — TELEPHONE (OUTPATIENT)
Dept: PRIMARY CARE CLINIC | Age: 59
End: 2019-10-04

## 2019-10-04 ASSESSMENT — ENCOUNTER SYMPTOMS
NAUSEA: 0
CONSTIPATION: 0
WHEEZING: 0
DIARRHEA: 0
BACK PAIN: 0
SHORTNESS OF BREATH: 0
COUGH: 0
VOMITING: 0

## 2019-12-09 LAB — DIABETIC RETINOPATHY: NEGATIVE

## 2020-02-17 ENCOUNTER — OFFICE VISIT (OUTPATIENT)
Dept: PRIMARY CARE CLINIC | Age: 60
End: 2020-02-17
Payer: MEDICARE

## 2020-02-17 VITALS
OXYGEN SATURATION: 99 % | DIASTOLIC BLOOD PRESSURE: 61 MMHG | BODY MASS INDEX: 42.67 KG/M2 | HEART RATE: 62 BPM | SYSTOLIC BLOOD PRESSURE: 109 MMHG | TEMPERATURE: 97 F | WEIGHT: 256.4 LBS

## 2020-02-17 PROCEDURE — G8482 FLU IMMUNIZE ORDER/ADMIN: HCPCS | Performed by: PHYSICIAN ASSISTANT

## 2020-02-17 PROCEDURE — G8417 CALC BMI ABV UP PARAM F/U: HCPCS | Performed by: PHYSICIAN ASSISTANT

## 2020-02-17 PROCEDURE — G0008 ADMIN INFLUENZA VIRUS VAC: HCPCS | Performed by: PHYSICIAN ASSISTANT

## 2020-02-17 PROCEDURE — 3046F HEMOGLOBIN A1C LEVEL >9.0%: CPT | Performed by: PHYSICIAN ASSISTANT

## 2020-02-17 PROCEDURE — 2022F DILAT RTA XM EVC RTNOPTHY: CPT | Performed by: PHYSICIAN ASSISTANT

## 2020-02-17 PROCEDURE — 3017F COLORECTAL CA SCREEN DOC REV: CPT | Performed by: PHYSICIAN ASSISTANT

## 2020-02-17 PROCEDURE — 99214 OFFICE O/P EST MOD 30 MIN: CPT | Performed by: PHYSICIAN ASSISTANT

## 2020-02-17 PROCEDURE — G8427 DOCREV CUR MEDS BY ELIG CLIN: HCPCS | Performed by: PHYSICIAN ASSISTANT

## 2020-02-17 PROCEDURE — 90686 IIV4 VACC NO PRSV 0.5 ML IM: CPT | Performed by: PHYSICIAN ASSISTANT

## 2020-02-17 PROCEDURE — 1036F TOBACCO NON-USER: CPT | Performed by: PHYSICIAN ASSISTANT

## 2020-02-17 RX ORDER — FAMOTIDINE 20 MG/1
20 TABLET, FILM COATED ORAL DAILY
Qty: 90 TABLET | Refills: 1 | Status: SHIPPED | OUTPATIENT
Start: 2020-02-17 | End: 2021-01-07 | Stop reason: SDUPTHER

## 2020-02-17 RX ORDER — ATORVASTATIN CALCIUM 80 MG/1
80 TABLET, FILM COATED ORAL NIGHTLY
Qty: 90 TABLET | Refills: 1 | Status: SHIPPED | OUTPATIENT
Start: 2020-02-17 | End: 2020-09-10

## 2020-02-17 RX ORDER — FENOFIBRATE 67 MG/1
67 CAPSULE ORAL
Qty: 90 CAPSULE | Refills: 1 | Status: SHIPPED | OUTPATIENT
Start: 2020-02-17 | End: 2020-09-29

## 2020-02-17 RX ORDER — CLONIDINE HYDROCHLORIDE 0.2 MG/1
0.2 TABLET ORAL 2 TIMES DAILY
Qty: 60 TABLET | Refills: 3 | Status: SHIPPED | OUTPATIENT
Start: 2020-02-17 | End: 2020-09-14

## 2020-02-17 RX ORDER — AMLODIPINE BESYLATE 5 MG/1
5 TABLET ORAL DAILY
Qty: 90 TABLET | Refills: 1 | Status: SHIPPED | OUTPATIENT
Start: 2020-02-17 | End: 2020-09-21

## 2020-02-17 SDOH — ECONOMIC STABILITY: INCOME INSECURITY: HOW HARD IS IT FOR YOU TO PAY FOR THE VERY BASICS LIKE FOOD, HOUSING, MEDICAL CARE, AND HEATING?: NOT HARD AT ALL

## 2020-02-17 SDOH — ECONOMIC STABILITY: FOOD INSECURITY: WITHIN THE PAST 12 MONTHS, YOU WORRIED THAT YOUR FOOD WOULD RUN OUT BEFORE YOU GOT MONEY TO BUY MORE.: NEVER TRUE

## 2020-02-17 SDOH — ECONOMIC STABILITY: FOOD INSECURITY: WITHIN THE PAST 12 MONTHS, THE FOOD YOU BOUGHT JUST DIDN'T LAST AND YOU DIDN'T HAVE MONEY TO GET MORE.: NEVER TRUE

## 2020-02-17 ASSESSMENT — PATIENT HEALTH QUESTIONNAIRE - PHQ9
2. FEELING DOWN, DEPRESSED OR HOPELESS: 0
SUM OF ALL RESPONSES TO PHQ9 QUESTIONS 1 & 2: 0
SUM OF ALL RESPONSES TO PHQ QUESTIONS 1-9: 0
SUM OF ALL RESPONSES TO PHQ QUESTIONS 1-9: 0
1. LITTLE INTEREST OR PLEASURE IN DOING THINGS: 0

## 2020-02-17 ASSESSMENT — ENCOUNTER SYMPTOMS
SHORTNESS OF BREATH: 1
CONSTIPATION: 1

## 2020-02-17 NOTE — PROGRESS NOTES
[facility]  [facility] [address]  Dept: [phone]  Dept Fax: [phone]    Office Progress/Follow Up Note    Date of patient's visit: 2/17/2020    Patient's Name:  Ewelina Do YOB: 1960            Patient Care Team:  Diana Al PA-C as PCP - General  Diana Al PA-C as PCP - [facility]  Dilshad Thacker as Consulting Physician (Endocrinology)  ================================================================    REASON FOR VISIT/CHIEF COMPLAINT:  Other (4 wk f/u. Patient states that the top of her foot hold a imprint and she would like a flu shot. )    HISTORY OF PRESENTING ILLNESS:  History was obtained from: patient, family member - brother. Ewelina Do is a 61 y.o. is here for follow-up for diabetes, GERD, high blood pressure. Patient brother is present helps provide history. Patient states she is compliant with medications. Patient was recently seen by endocrinology on 2/10/2020, patient states \"A1c is 10.1\", informed patient will obtain records. Patient states she will be following up in the next month. Patient was seen by neurology in 9/2019 for history of CVA, informed patient will obtain records. Discussed GERD in depth with patient, stable, patient requesting medication refill. Patient blood pressures controlled in office. Patient weight is stable. Discussed weight loss in depth with patient, encourage patient make changes in diet. Patient requesting additional medications refill. [address] gap diagnosis reviewed. Labs reviewed.     Health maintenance reviewed, discussed influenza vaccination, annual Medicare wellness visit, and cervical cancer screening in depth with patient, patient consented and influenza vaccine administered in office, patient states \"believes she had a hysterectomy at [facility] in [place]\", patient completed PHILLY will try to obtain records, patient scheduled for AMW visit. Medications reviewed, refilled Norvasc 5 mg, Lipitor 80 mg, Catapres 0.2 mg, Pepcid 20 mg, fenofibrate 67 mg.     HPI    Patient Active Problem List   Diagnosis    Malignant neoplasm of uterus ([facility] [facility]    Acid reflux    Heart murmur    Class 3 severe obesity due to excess calories with serious comorbidity and body mass index (BMI) of 40.0 to 44.9 in adult [facility])    Type 2 diabetes mellitus treated with insulin (HCC)    Microalbuminuria    Abnormal ECG    Noncompliance with treatment    Hypokalemia    Lymphedema of leg    Primary osteoarthritis of both knees    Bad memory    Unsteady gait    Calculus of gallbladder    Weakness of left side of body    AF (amaurosis fugax)    Chronic kidney disease    Abnormal liver enzymes    Ataxic aphasia    Herpes simplex type 2 infection    Episode of syncope    Temporary cerebral vascular dysfunction    Moderate or severe vision impairment, both eyes    Familial hyperlipidemia    Essential hypertension    Occipital cerebral infarction ([facility] [address] [facility]    Drug overdose    Hypersomnolence    Ischemic stroke [facility])       Health Maintenance Due   Topic Date Due    Cervical cancer screen  12/01/2016    Diabetic foot exam  03/29/2017    Annual Wellness Visit (AWV)  06/19/2019    A1C test (Diabetic or Prediabetic)  06/25/2019       No Known Allergies      Current Outpatient Medications   Medication Sig Dispense Refill    atorvastatin (LIPITOR) 80 MG tablet Take 1 tablet by mouth nightly 90 tablet 1    fenofibrate micronized (LOFIBRA) 67 MG capsule Take 1 capsule by mouth every morning (before breakfast) 90 capsule 1    amLODIPine (NORVASC) 5 MG tablet Take 1 tablet by mouth daily 90 tablet 1    famotidine (PEPCID) 20 MG tablet Take 1 tablet by mouth daily 90 tablet 1    cloNIDine (CATAPRES) 0.2 MG tablet Take 1 tablet by mouth 2 times daily 60 tablet 3    Handicap Placard MISC by Does not apply route 1 each gets in/or out of the shower. Please dispense insurance approved shower transfer bench. 1 each 0    Misc. Devices (WALL GRAB BAR) MISC Use every time patient gets in/or out of the shower. Please dispense insurance approved wall grab bar. 1 each 0    carvedilol (COREG) 25 MG tablet Take 25 mg by mouth 2 times daily (with meals)      ONETOUCH DELICA LANCETS 81G MISC       ONE TOUCH ULTRA TEST strip       Blood Pressure Monitoring (B-D ASSURE BPM/AUTO WRIST CUFF) MISC As directed. Labile hypertension. 1 each 0    B-D ULTRAFINE III SHORT PEN 31G X 8 MM MISC       omega-3 acid ethyl esters (LOVAZA) 1 g capsule Take 2 g by mouth 2 times daily       No current facility-administered medications for this visit. Social History     Tobacco Use    Smoking status: Never Smoker    Smokeless tobacco: Never Used   Substance Use Topics    Alcohol use: No    Drug use: No       Family History   Problem Relation Age of Onset    Heart Disease Mother     Diabetes Mother     Cancer Mother         breast    Cancer Father         colon    Heart Disease Father     High Blood Pressure Father         REVIEW OFSYSTEMS:  Review of Systems   Constitutional: Negative for chills and fever. HENT: Negative for congestion. Respiratory: Positive for shortness of breath (on exertion). Negative for cough and wheezing. Cardiovascular: Negative for chest pain. Gastrointestinal: Positive for constipation. Negative for diarrhea, nausea and vomiting. Genitourinary: Positive for frequency (d/t fluid intake). Negative for dysuria and urgency. Musculoskeletal: Negative for back pain, myalgias and neck pain. Neurological: Negative for headaches.        PHYSICAL EXAM:  Vitals:    02/17/20 1029   BP: 109/61   Site: Right Upper Arm   Position: Sitting   Cuff Size: Large Adult   Pulse: 62   Temp: 97 °F (36.1 °C)   TempSrc: Oral   SpO2: 99%   Weight: 256 lb 6.4 oz (116.3 kg)     BP Readings from Last 3 Encounters:   02/17/20 109/61   09/16/19 128/70   05/13/19 (!) 152/77        Physical Exam  Vitals signs reviewed. Constitutional:       Appearance: Normal appearance. She is well-developed and well-groomed. She is obese. HENT:      Head: Normocephalic and atraumatic. Right Ear: External ear normal.      Left Ear: External ear normal.      Nose: Nose normal.   Eyes:      General: Lids are normal.      Conjunctiva/sclera: Conjunctivae normal.      Pupils: Pupils are equal, round, and reactive to light. Cardiovascular:      Rate and Rhythm: Normal rate and regular rhythm. Heart sounds: Normal heart sounds. Pulmonary:      Effort: Pulmonary effort is normal.      Breath sounds: Normal breath sounds. Abdominal:      Palpations: Abdomen is soft. There is no mass. Tenderness: There is no abdominal tenderness. Musculoskeletal:         General: No tenderness. Skin:     General: Skin is warm and dry. Neurological:      Mental Status: She is alert and oriented to person, place, and time. Psychiatric:         Speech: Speech is delayed. Behavior: Behavior is cooperative. DIAGNOSTIC FINDINGS:  CBC:  Lab Results   Component Value Date    WBC 5.4 04/04/2015    HGB 14.2 04/04/2015     04/04/2015       BMP:    Lab Results   Component Value Date     04/04/2015    K 4.2 04/04/2015    CL 96 04/04/2015    CO2 25 04/04/2015    BUN 14 04/04/2015    CREATININE 0.60 04/04/2015    GLUCOSE 204 08/03/2017       HEMOGLOBIN A1C:   Lab Results   Component Value Date    LABA1C 9.6 03/25/2019       FASTING LIPID PANEL:  Lab Results   Component Value Date    CHOL 291 (H) 04/04/2015    HDL 42 04/04/2015    TRIG 502 (H) 04/04/2015       ASSESSMENT AND PLAN:  Rosa Marin was seen today for other. Diagnoses and all orders for this visit:    Type 2 diabetes mellitus with diabetic polyneuropathy, with long-term current use of insulin (HCC)  -     atorvastatin (LIPITOR) 80 MG tablet;  Take 1 tablet by mouth nightly  - fenofibrate micronized (LOFIBRA) 67 MG capsule; Take 1 capsule by mouth every morning (before breakfast)    Essential hypertension  -     amLODIPine (NORVASC) 5 MG tablet; Take 1 tablet by mouth daily  -     cloNIDine (CATAPRES) 0.2 MG tablet; Take 1 tablet by mouth 2 times daily    Gastroesophageal reflux disease with esophagitis  -     famotidine (PEPCID) 20 MG tablet; Take 1 tablet by mouth daily    Class 3 severe obesity due to excess calories with serious comorbidity and body mass index (BMI) of 40.0 to 44.9 in adult [facility])    Needs flu shot  -     Influenza, Quadv, 3 yrs and older, IM, PF, Prefill Syr or SDV, 0.5mL (AFLURIA QUADV, PF)      FOLLOW UP AND INSTRUCTIONS:  Return in about 5 months (around 7/17/2020) for AMW. · Tonny Atkinson received counseling on the following healthy behaviors:nutrition    · Discussed use, benefit, and side effects of prescribed medications. Barriers to medication compliance addressed. All patient questions answered. Pt voiced understanding. · Patient given educational materials - see patient instructions    Electronically signed by Jeremy Peter PA-C on 2/17/20 at 10:53 AM    This note is created with the assistance of a speech-recognition program. While intendingto generate a document that actually reflects the content of the visit, the document can still have some mistakes which may not have been identified and corrected by editing.

## 2020-03-01 ENCOUNTER — TELEPHONE (OUTPATIENT)
Dept: PRIMARY CARE CLINIC | Age: 60
End: 2020-03-01

## 2020-03-01 ASSESSMENT — ENCOUNTER SYMPTOMS
NAUSEA: 0
COUGH: 0
WHEEZING: 0
DIARRHEA: 0
VOMITING: 0
BACK PAIN: 0

## 2020-07-13 LAB
CREATININE: 1.7 MG/DL
POTASSIUM (K+): 3.8

## 2020-12-18 NOTE — TELEPHONE ENCOUNTER
Health Maintenance   Topic Date Due    Cervical cancer screen  12/01/2016    Diabetic foot exam  03/29/2017    Annual Wellness Visit (AWV)  06/19/2019    A1C test (Diabetic or Prediabetic)  03/25/2020    Lipid screen  05/20/2020    Potassium monitoring  05/20/2020    Creatinine monitoring  05/20/2020    Flu vaccine (1) 09/01/2020    DTaP/Tdap/Td vaccine (1 - Tdap) 02/17/2021 (Originally 9/16/1979)    Shingles Vaccine (1 of 2) 02/17/2021 (Originally 9/16/2010)    Breast cancer screen  06/24/2021    Diabetic retinal exam  12/09/2021    Colon cancer screen colonoscopy  06/25/2028    Pneumococcal 0-64 years Vaccine  Completed    Hepatitis C screen  Completed    HIV screen  Completed    Hepatitis A vaccine  Aged Out    Hib vaccine  Aged Out    Meningococcal (ACWY) vaccine  Aged Out             (applicable per patient's age: Cancer Screenings, Depression Screening, Fall Risk Screening, Immunizations)    Hemoglobin A1C (%)   Date Value   03/25/2019 9.6   06/04/2018 9.6 (H)   12/07/2017 10.4 (H)     Microalb/Crt.  Ratio (mcg/mg creat)   Date Value   04/04/2015 3,460     LDL Cholesterol (mg/dL)   Date Value   04/04/2015          AST (U/L)   Date Value   04/04/2015 19     ALT (U/L)   Date Value   04/04/2015 24     BUN (mg/dL)   Date Value   04/04/2015 14      (goal A1C is < 7)   (goal LDL is <100) need 30-50% reduction from baseline     BP Readings from Last 3 Encounters:   02/17/20 109/61   09/16/19 128/70   05/13/19 (!) 152/77    (goal /80)      All Future Testing planned in [facility]:  Lab Frequency Next Occurrence       Next Visit Date:  Future Appointments   Date Time Provider Jack Shah   1/11/2021  9:00 AM Hayden Aguilera PA-C [address]            Patient Active Problem List:     Malignant neoplasm of uterus ([address]     Acid reflux     Heart murmur     Class 3 severe obesity due to excess calories with serious comorbidity and body mass index (BMI) of 40.0 to 44.9 in adult ([facility]     Type 2 diabetes mellitus treated with insulin (HCC)     Microalbuminuria     Abnormal ECG     Noncompliance with treatment     Hypokalemia     Lymphedema of leg     Primary osteoarthritis of both knees     Bad memory     Unsteady gait     Calculus of gallbladder     Weakness of left side of body     AF (amaurosis fugax)     Chronic kidney disease     Abnormal liver enzymes     Ataxic aphasia     Herpes simplex type 2 infection     Episode of syncope     Temporary cerebral vascular dysfunction     Moderate or severe vision impairment, both eyes     Familial hyperlipidemia     Essential hypertension     Occipital cerebral infarction [facility])     Drug overdose     Hypersomnolence     Ischemic stroke ([facility]

## 2020-12-20 RX ORDER — CLONIDINE HYDROCHLORIDE 0.2 MG/1
TABLET ORAL
Qty: 180 TABLET | Refills: 0 | Status: SHIPPED | OUTPATIENT
Start: 2020-12-20 | End: 2021-02-10 | Stop reason: SDUPTHER

## 2020-12-23 RX ORDER — ATORVASTATIN CALCIUM 80 MG/1
TABLET, FILM COATED ORAL
Qty: 90 TABLET | Refills: 0 | OUTPATIENT
Start: 2020-12-23

## 2020-12-23 NOTE — TELEPHONE ENCOUNTER
Health Maintenance   Topic Date Due    Cervical cancer screen  12/01/2016    Diabetic foot exam  03/29/2017    Annual Wellness Visit (AWV)  06/19/2019    A1C test (Diabetic or Prediabetic)  03/25/2020    Lipid screen  05/20/2020    Potassium monitoring  05/20/2020    Creatinine monitoring  05/20/2020    Flu vaccine (1) 09/01/2020    DTaP/Tdap/Td vaccine (1 - Tdap) 02/17/2021 (Originally 9/16/1979)    Shingles Vaccine (1 of 2) 02/17/2021 (Originally 9/16/2010)    Breast cancer screen  06/24/2021    Diabetic retinal exam  12/09/2021    Colon cancer screen colonoscopy  06/25/2028    Pneumococcal 0-64 years Vaccine  Completed    Hepatitis C screen  Completed    HIV screen  Completed    Hepatitis A vaccine  Aged Out    Hib vaccine  Aged Out    Meningococcal (ACWY) vaccine  Aged Out             (applicable per patient's age: Cancer Screenings, Depression Screening, Fall Risk Screening, Immunizations)    Hemoglobin A1C (%)   Date Value   03/25/2019 9.6   06/04/2018 9.6 (H)   12/07/2017 10.4 (H)     Microalb/Crt.  Ratio (mcg/mg creat)   Date Value   04/04/2015 3,460     LDL Cholesterol (mg/dL)   Date Value   04/04/2015          AST (U/L)   Date Value   04/04/2015 19     ALT (U/L)   Date Value   04/04/2015 24     BUN (mg/dL)   Date Value   04/04/2015 14      (goal A1C is < 7)   (goal LDL is <100) need 30-50% reduction from baseline     BP Readings from Last 3 Encounters:   02/17/20 109/61   09/16/19 128/70   05/13/19 (!) 152/77    (goal /80)      All Future Testing planned in [facility]:  Lab Frequency Next Occurrence       Next Visit Date:  Future Appointments   Date Time Provider Jack Shah   1/11/2021  9:00 AM Chrissie Coats PA-C [address]            Patient Active Problem List:     Malignant neoplasm of uterus ([address]     Acid reflux     Heart murmur     Class 3 severe obesity due to excess calories with serious comorbidity and body mass index (BMI) of 40.0 to 44.9 in adult

## 2021-01-07 ENCOUNTER — TELEPHONE (OUTPATIENT)
Dept: PRIMARY CARE CLINIC | Age: 61
End: 2021-01-07

## 2021-01-07 DIAGNOSIS — E11.42 TYPE 2 DIABETES MELLITUS WITH DIABETIC POLYNEUROPATHY, WITH LONG-TERM CURRENT USE OF INSULIN (HCC): ICD-10-CM

## 2021-01-07 DIAGNOSIS — K21.00 GASTROESOPHAGEAL REFLUX DISEASE WITH ESOPHAGITIS: ICD-10-CM

## 2021-01-07 DIAGNOSIS — Z79.4 TYPE 2 DIABETES MELLITUS WITH DIABETIC POLYNEUROPATHY, WITH LONG-TERM CURRENT USE OF INSULIN (HCC): ICD-10-CM

## 2021-01-07 DIAGNOSIS — I10 ESSENTIAL HYPERTENSION: ICD-10-CM

## 2021-01-07 RX ORDER — ATORVASTATIN CALCIUM 80 MG/1
80 TABLET, FILM COATED ORAL DAILY
Qty: 60 TABLET | Refills: 0 | Status: SHIPPED | OUTPATIENT
Start: 2021-01-07 | End: 2021-02-10 | Stop reason: SDUPTHER

## 2021-01-07 RX ORDER — FENOFIBRATE 67 MG/1
67 CAPSULE ORAL
Qty: 60 CAPSULE | Refills: 0 | Status: SHIPPED | OUTPATIENT
Start: 2021-01-07 | End: 2021-02-10 | Stop reason: SDUPTHER

## 2021-01-07 RX ORDER — FAMOTIDINE 20 MG/1
20 TABLET, FILM COATED ORAL DAILY
Qty: 60 TABLET | Refills: 0 | Status: SHIPPED | OUTPATIENT
Start: 2021-01-07 | End: 2021-02-10 | Stop reason: SDUPTHER

## 2021-01-07 RX ORDER — AMLODIPINE BESYLATE 5 MG/1
5 TABLET ORAL DAILY
Qty: 60 TABLET | Refills: 0 | Status: SHIPPED | OUTPATIENT
Start: 2021-01-07 | End: 2021-02-01

## 2021-01-20 LAB — DIABETIC RETINOPATHY: POSITIVE

## 2021-02-01 DIAGNOSIS — I10 ESSENTIAL HYPERTENSION: ICD-10-CM

## 2021-02-01 RX ORDER — AMLODIPINE BESYLATE 5 MG/1
TABLET ORAL
Qty: 60 TABLET | Refills: 0 | Status: SHIPPED | OUTPATIENT
Start: 2021-02-01 | End: 2021-02-10 | Stop reason: SDUPTHER

## 2021-02-01 NOTE — TELEPHONE ENCOUNTER
Health Maintenance   Topic Date Due    Cervical cancer screen  12/01/2016    Diabetic foot exam  03/29/2017    Annual Wellness Visit (AWV)  06/19/2019    A1C test (Diabetic or Prediabetic)  03/25/2020    Lipid screen  05/20/2020    Potassium monitoring  05/20/2020    Creatinine monitoring  05/20/2020    Flu vaccine (1) 09/01/2020    DTaP/Tdap/Td vaccine (1 - Tdap) 02/17/2021 (Originally 9/16/1979)    Shingles Vaccine (1 of 2) 02/17/2021 (Originally 9/16/2010)    Breast cancer screen  06/24/2021    Diabetic retinal exam  12/09/2021    Colon cancer screen colonoscopy  06/25/2028    Pneumococcal 0-64 years Vaccine  Completed    Hepatitis C screen  Completed    HIV screen  Completed    Hepatitis A vaccine  Aged Out    Hib vaccine  Aged Out    Meningococcal (ACWY) vaccine  Aged Out             (applicable per patient's age: Cancer Screenings, Depression Screening, Fall Risk Screening, Immunizations)    Hemoglobin A1C (%)   Date Value   03/25/2019 9.6   06/04/2018 9.6 (H)   12/07/2017 10.4 (H)     Microalb/Crt.  Ratio (mcg/mg creat)   Date Value   04/04/2015 3,460     LDL Cholesterol (mg/dL)   Date Value   04/04/2015          AST (U/L)   Date Value   04/04/2015 19     ALT (U/L)   Date Value   04/04/2015 24     BUN (mg/dL)   Date Value   04/04/2015 14      (goal A1C is < 7)   (goal LDL is <100) need 30-50% reduction from baseline     BP Readings from Last 3 Encounters:   02/17/20 109/61   09/16/19 128/70   05/13/19 (!) 152/77    (goal /80)      All Future Testing planned in [facility]:  Lab Frequency Next Occurrence       Next Visit Date:  Future Appointments   Date Time Provider Jack Shah   2/10/2021  8:00 AM Johnathon Bains PA-C [address] [place]            Patient Active Problem List:     Malignant neoplasm of uterus ([address]     Acid reflux     Heart murmur     Class 3 severe obesity due to excess calories with serious comorbidity and body mass index (BMI) of 40.0 to 44.9 in adult ([facility]     Type 2 diabetes mellitus treated with insulin (HCC)     Microalbuminuria     Abnormal ECG     Noncompliance with treatment     Hypokalemia     Lymphedema of leg     Primary osteoarthritis of both knees     Bad memory     Unsteady gait     Calculus of gallbladder     Weakness of left side of body     AF (amaurosis fugax)     Chronic kidney disease     Abnormal liver enzymes     Ataxic aphasia     Herpes simplex type 2 infection     Episode of syncope     Temporary cerebral vascular dysfunction     Moderate or severe vision impairment, both eyes     Familial hyperlipidemia     Essential hypertension     Occipital cerebral infarction [facility])     Drug overdose     Hypersomnolence     Ischemic stroke ([facility]

## 2021-02-10 ENCOUNTER — OFFICE VISIT (OUTPATIENT)
Dept: PRIMARY CARE CLINIC | Age: 61
End: 2021-02-10
Payer: MEDICARE

## 2021-02-10 VITALS
SYSTOLIC BLOOD PRESSURE: 128 MMHG | OXYGEN SATURATION: 95 % | WEIGHT: 275.4 LBS | DIASTOLIC BLOOD PRESSURE: 65 MMHG | HEIGHT: 65 IN | HEART RATE: 69 BPM | TEMPERATURE: 96.4 F | BODY MASS INDEX: 45.88 KG/M2

## 2021-02-10 DIAGNOSIS — M16.11 PRIMARY OSTEOARTHRITIS OF RIGHT HIP: ICD-10-CM

## 2021-02-10 DIAGNOSIS — E11.42 TYPE 2 DIABETES MELLITUS WITH DIABETIC POLYNEUROPATHY, WITH LONG-TERM CURRENT USE OF INSULIN (HCC): Primary | ICD-10-CM

## 2021-02-10 DIAGNOSIS — G81.91 RIGHT HEMIPARESIS (HCC): ICD-10-CM

## 2021-02-10 DIAGNOSIS — Z76.0 MEDICATION REFILL: ICD-10-CM

## 2021-02-10 DIAGNOSIS — N18.9 CHRONIC KIDNEY DISEASE, UNSPECIFIED CKD STAGE: ICD-10-CM

## 2021-02-10 DIAGNOSIS — I10 ESSENTIAL HYPERTENSION: ICD-10-CM

## 2021-02-10 DIAGNOSIS — Z23 FLU VACCINE NEED: ICD-10-CM

## 2021-02-10 DIAGNOSIS — Z79.4 TYPE 2 DIABETES MELLITUS WITH DIABETIC POLYNEUROPATHY, WITH LONG-TERM CURRENT USE OF INSULIN (HCC): Primary | ICD-10-CM

## 2021-02-10 DIAGNOSIS — K21.00 GASTROESOPHAGEAL REFLUX DISEASE WITH ESOPHAGITIS, UNSPECIFIED WHETHER HEMORRHAGE: ICD-10-CM

## 2021-02-10 DIAGNOSIS — E66.01 CLASS 3 SEVERE OBESITY DUE TO EXCESS CALORIES WITH SERIOUS COMORBIDITY AND BODY MASS INDEX (BMI) OF 45.0 TO 49.9 IN ADULT (HCC): ICD-10-CM

## 2021-02-10 LAB — HBA1C MFR BLD: 11 %

## 2021-02-10 PROCEDURE — 1036F TOBACCO NON-USER: CPT | Performed by: PHYSICIAN ASSISTANT

## 2021-02-10 PROCEDURE — G8482 FLU IMMUNIZE ORDER/ADMIN: HCPCS | Performed by: PHYSICIAN ASSISTANT

## 2021-02-10 PROCEDURE — 83036 HEMOGLOBIN GLYCOSYLATED A1C: CPT | Performed by: PHYSICIAN ASSISTANT

## 2021-02-10 PROCEDURE — G8417 CALC BMI ABV UP PARAM F/U: HCPCS | Performed by: PHYSICIAN ASSISTANT

## 2021-02-10 PROCEDURE — 3017F COLORECTAL CA SCREEN DOC REV: CPT | Performed by: PHYSICIAN ASSISTANT

## 2021-02-10 PROCEDURE — G0008 ADMIN INFLUENZA VIRUS VAC: HCPCS | Performed by: PHYSICIAN ASSISTANT

## 2021-02-10 PROCEDURE — G8427 DOCREV CUR MEDS BY ELIG CLIN: HCPCS | Performed by: PHYSICIAN ASSISTANT

## 2021-02-10 PROCEDURE — 3046F HEMOGLOBIN A1C LEVEL >9.0%: CPT | Performed by: PHYSICIAN ASSISTANT

## 2021-02-10 PROCEDURE — 2022F DILAT RTA XM EVC RTNOPTHY: CPT | Performed by: PHYSICIAN ASSISTANT

## 2021-02-10 PROCEDURE — 99214 OFFICE O/P EST MOD 30 MIN: CPT | Performed by: PHYSICIAN ASSISTANT

## 2021-02-10 PROCEDURE — 90686 IIV4 VACC NO PRSV 0.5 ML IM: CPT | Performed by: PHYSICIAN ASSISTANT

## 2021-02-10 RX ORDER — FAMOTIDINE 20 MG/1
20 TABLET, FILM COATED ORAL DAILY
Qty: 90 TABLET | Refills: 1 | Status: SHIPPED | OUTPATIENT
Start: 2021-02-10 | End: 2021-08-10 | Stop reason: SDUPTHER

## 2021-02-10 RX ORDER — ACETAMINOPHEN 160 MG
TABLET,DISINTEGRATING ORAL
COMMUNITY
Start: 2020-11-24

## 2021-02-10 RX ORDER — ATORVASTATIN CALCIUM 80 MG/1
80 TABLET, FILM COATED ORAL DAILY
Qty: 90 TABLET | Refills: 1 | Status: SHIPPED | OUTPATIENT
Start: 2021-02-10 | End: 2021-10-12 | Stop reason: SDUPTHER

## 2021-02-10 RX ORDER — FENOFIBRATE 67 MG/1
67 CAPSULE ORAL
Qty: 90 CAPSULE | Refills: 1 | Status: SHIPPED | OUTPATIENT
Start: 2021-02-10 | End: 2021-08-23 | Stop reason: SDUPTHER

## 2021-02-10 RX ORDER — SENNOSIDES 8.6 MG
650 CAPSULE ORAL 2 TIMES DAILY PRN
Qty: 24 TABLET | Refills: 0 | COMMUNITY
Start: 2021-02-10 | End: 2022-06-16

## 2021-02-10 RX ORDER — AMLODIPINE BESYLATE 5 MG/1
5 TABLET ORAL DAILY
Qty: 90 TABLET | Refills: 1 | Status: SHIPPED | OUTPATIENT
Start: 2021-02-10 | End: 2021-06-30 | Stop reason: SDUPTHER

## 2021-02-10 RX ORDER — CLONIDINE HYDROCHLORIDE 0.2 MG/1
0.2 TABLET ORAL 2 TIMES DAILY
Qty: 180 TABLET | Refills: 0 | Status: SHIPPED | OUTPATIENT
Start: 2021-02-10 | End: 2021-05-18 | Stop reason: SDUPTHER

## 2021-02-10 ASSESSMENT — PATIENT HEALTH QUESTIONNAIRE - PHQ9
1. LITTLE INTEREST OR PLEASURE IN DOING THINGS: 0
SUM OF ALL RESPONSES TO PHQ QUESTIONS 1-9: 0
SUM OF ALL RESPONSES TO PHQ9 QUESTIONS 1 & 2: 0

## 2021-02-10 ASSESSMENT — ENCOUNTER SYMPTOMS: SHORTNESS OF BREATH: 1

## 2021-02-10 NOTE — PROGRESS NOTES
Visit Information    Have you changed or started any medications since your last visit including any over-the-counter medicines, vitamins, or herbal medicines? no   Are you having any side effects from any of your medications? -  no  Have you stopped taking any of your medications? Is so, why? -  no    Have you seen any other physician or provider since your last visit? Yes - Records Obtained  Have you had any other diagnostic tests since your last visit? No  Have you been seen in the emergency room and/or had an admission to a hospital since we last saw you? No  Have you had your routine dental cleaning in the past 6 months? no    Have you activated your [employer] account? If not, what are your barriers?  Yes     Patient Care Team:  Luana Souza PA-C as PCP - General  Luana Souza PA-C as PCP - [facility]  Abdoulaye Serrano as Consulting Physician (Endocrinology)    Medical History Review  Past Medical, Family, and Social History reviewed and does not contribute to the patient presenting condition    Health Maintenance   Topic Date Due    Cervical cancer screen  12/01/2016    Diabetic foot exam  03/29/2017    Annual Wellness Visit (AWV)  06/19/2019    A1C test (Diabetic or Prediabetic)  03/25/2020    Lipid screen  05/20/2020    Potassium monitoring  05/20/2020    Creatinine monitoring  05/20/2020    Flu vaccine (1) 09/01/2020    DTaP/Tdap/Td vaccine (1 - Tdap) 02/17/2021 (Originally 9/16/1979)    Shingles Vaccine (1 of 2) 02/17/2021 (Originally 9/16/2010)    Breast cancer screen  06/24/2021    Diabetic retinal exam  12/09/2021    Colon cancer screen colonoscopy  06/25/2028    Pneumococcal 0-64 years Vaccine  Completed    Hepatitis C screen  Completed    HIV screen  Completed    Hepatitis A vaccine  Aged Out    Hib vaccine  Aged Out    Meningococcal (ACWY) vaccine  Aged Out

## 2021-02-10 NOTE — PATIENT INSTRUCTIONS
· Confirm taking spironolcatone daily, if not contact pharmacy for refill  · Get lumbar spine and right hip x-ray done  · Follow up with endocrinology on diabetes  · Increase physical activity to help with weight loss

## 2021-02-10 NOTE — PROGRESS NOTES
[facility]  [id number] [facility] [address]  Dept: [phone]  Dept Fax: [phone]    Office Progress/Follow Up Note    Date of patient's visit: 2/10/2021    Patient's Name:  Karo Mcclelland YOB: 1960            Patient Care Team:  Arleen Rush PA-C as PCP - General  Arleen Rush PA-C as PCP - [facility] Devendra King Provider  Elaine Reese as Consulting Physician (Endocrinology)  KLARISSA Mota CNP as Nurse Practitioner (Neurology)  ================================================================    REASON FOR VISIT/CHIEF COMPLAINT:  Check-Up (burning sensation rt hip onset 3 weeks ago), Medication Refill, and Leg Swelling (bilateral onset 3 weeks ago)    HISTORY OF PRESENTING ILLNESS:  History was obtained from: patient. Karo Mcclelland is a 61 y.o. is here for follow-up for right hip pain, diabetes, medication refill. Patient states she is compliant with medications. Patient A1c in office is 11. Patient is seen by endocrinology but \"not seen in over a year\", patient does confirm \"will be following up later this month\". Patient states \"blood sugars 1/1/2019 this a.m., most often <200s\". Discussed A1c reading, diabetes in depth with patient, encourage patient to monitor sugar and carb intake to better control blood sugars, encourage patient to follow-up with endocrinology later this month, patient voiced understanding, patient requested medication refill. Patient was seen by neurology for right hemiparesis, informed patient we will request urology office note. Patient is seen by nephrology for chronic kidney disease, [facility], phone [phone]. Informed patient we will request records and any labs she had completed. Patient had right hip x-ray in 2018 that showed arthritis patient confirms decreased range of motion and joint. Informed patient she will be provided sample of Tylenol 650 mg to take into contact PCP office to update on symptoms. Informed patient we will repeat right hip x-ray and add lumbar spine x-ray to further evaluate pain, patient voiced understanding. Pending x-ray results and medication response possible muscle relaxer trial.    Patient blood pressures controlled in office. Patient requested medication refill. Patient has gained 19 pounds. Discussed weight loss in depth with patient, encourage patient make changes in diet. Patient requesting additional medication refills for GERD, patient denies any side effect of medication. Labs reviewed. Health maintenance reviewed, patient states she had a diabetic eye exam completed \"at [employer]\", informed patient will obtain records, discussed influenza vaccination in depth with patient, patient consented and influenza vaccine administered in office. Medications reviewed and prescribed. Patient was informed that PCP will be residing in April 2021, encourage patient to get established with another provider in office or contact PCP office to be referred to another primary care patient, patient voiced understanding.     HPI    Patient Active Problem List   Diagnosis    Malignant neoplasm of uterus ([address]    Acid reflux    Heart murmur    Class 3 severe obesity due to excess calories with serious comorbidity and body mass index (BMI) of 40.0 to 44.9 in adult [facility])    Type 2 diabetes mellitus treated with insulin (HCC)    Microalbuminuria    Noncompliance with treatment    Hypokalemia    Lymphedema of leg    Primary osteoarthritis of both knees    Bad memory    Unsteady gait    Calculus of gallbladder    Weakness of left side of body    AF (amaurosis fugax)    Chronic kidney disease    Abnormal liver enzymes    Ataxic aphasia    Herpes simplex type 2 infection    Temporary cerebral vascular dysfunction    Moderate or severe vision impairment, both eyes    Familial hyperlipidemia    Essential hypertension    [facility]ipital cerebral infarction ([facility]    Drug overdose    Hypersomnolence    Ischemic stroke ([facility]    Right hemiparesis ([facility]       Health Maintenance Due   Topic Date Due    DTaP/Tdap/Td vaccine (1 - Tdap) 09/16/1979    Shingles Vaccine (1 of 2) 09/16/2010    Cervical cancer screen  12/01/2016    Diabetic foot exam  03/29/2017    Diabetic microalbuminuria test  03/29/2017    Annual Wellness Visit (AWV)  06/19/2019    Lipid screen  05/20/2020    Potassium monitoring  05/20/2020    Creatinine monitoring  05/20/2020       No Known Allergies      Current Outpatient Medications   Medication Sig Dispense Refill    Cholecalciferol (VITAMIN D3) 50 MCG (2000 UT) CAPS TAKE 1 CAPSULE BY MOUTH EVERY DAY      amLODIPine (NORVASC) 5 MG tablet Take 1 tablet by mouth daily 90 tablet 1    fenofibrate micronized (LOFIBRA) 67 MG capsule Take 1 capsule by mouth every morning (before breakfast) 90 capsule 1    atorvastatin (LIPITOR) 80 MG tablet Take 1 tablet by mouth daily 90 tablet 1    famotidine (PEPCID) 20 MG tablet Take 1 tablet by mouth daily 90 tablet 1    cloNIDine (CATAPRES) 0.2 MG tablet Take 1 tablet by mouth 2 times daily 180 tablet 0    acetaminophen (TYLENOL 8 HOUR) 650 MG extended release tablet Take 1 tablet by mouth 2 times daily as needed for Pain 24 tablet 0    Handicap Placard MISC by Does not apply route 1 each 0    Multiple Vitamins-Minerals (THERAGRAN-M ADVANCED 50 PLUS) TABS Take 1 tablet by mouth daily 30 tablet 3    Liraglutide (VICTOZA) 18 MG/3ML SOPN SC injection Inject 1.2 mg into the skin daily      docusate sodium (COLACE) 100 MG capsule Take 100 mg by mouth 2 times daily      omega-3 acid ethyl esters (LOVAZA) 1 g capsule Take 2 g by mouth 2 times daily      lidocaine (LIDODERM) 5 % Place 1 patch onto the skin daily 12 hours on, 12 hours off.       insulin regular human (HUMULIN R U-500 KWIKPEN) 500 UNIT/ML SOPN concentrated injection pen Inject 80 Units into the skin Daily with supper Daily with dinner      insulin regular human (HUMULIN R U-500 KWIKPEN) 500 UNIT/ML SOPN concentrated injection pen Inject 15 Units into the skin daily [facility] records      insulin regular human (HUMULIN R U-500 KWIKPEN) 500 UNIT/ML SOPN concentrated injection pen Inject 90 Units into the skin daily (with breakfast) [facility] records      furosemide (LASIX) 20 MG tablet Take 20 mg by mouth 2 times daily       Compression Stockings MISC Provide compressions stocking covered by insurance, 20 -30 2 each 0    Misc. Devices (COMMODE BEDSIDE) [facility] Provide bedside commode covered by insurance 1 each 0    aspirin 81 MG chewable tablet Take 81 mg by mouth daily      REPATHA SURECLICK 082 MG/ML SOAJ       ARTIFICIAL TEARS 0.2-0.2-1 % SOLN       Ascorbic Acid (VITAMIN C) 1000 MG tablet Take 1,000 mg by mouth daily      latanoprost (XALATAN) 0.005 % ophthalmic solution Place 1 drop into the right eye daily       spironolactone (ALDACTONE) 25 MG tablet Take 25 mg by mouth daily       Misc. Devices (TRANSFER BENCH) MISC Use shower transfer bench with back,  every time patient gets in/or out of the shower. Please dispense insurance approved shower transfer bench. 1 each 0    Misc. Devices (WALL GRAB BAR) MISC Use every time patient gets in/or out of the shower. Please dispense insurance approved wall grab bar. 1 each 0    carvedilol (COREG) 25 MG tablet Take 25 mg by mouth 2 times daily (with meals)      ONETOUCH DELICA LANCETS 97P MISC       ONE TOUCH ULTRA TEST strip       Blood Pressure Monitoring (B-D ASSURE BPM/AUTO WRIST CUFF) MISC As directed. Labile hypertension. 1 each 0    B-D ULTRAFINE III SHORT PEN 31G X 8 MM MISC        No current facility-administered medications for this visit.         Social History     Tobacco Use    Smoking status: Never Smoker    Smokeless tobacco: Never Used   Substance Use Topics    kidney disease, unspecified CKD stage    Class 3 severe obesity due to excess calories with serious comorbidity and body mass index (BMI) of 45.0 to 49.9 in adult [facility])    Essential hypertension  -     amLODIPine (NORVASC) 5 MG tablet; Take 1 tablet by mouth daily  -     cloNIDine (CATAPRES) 0.2 MG tablet; Take 1 tablet by mouth 2 times daily    Gastroesophageal reflux disease with esophagitis, unspecified whether hemorrhage  -     famotidine (PEPCID) 20 MG tablet; Take 1 tablet by mouth daily    Flu vaccine need  -     INFLUENZA, QUADV, 3 YRS AND OLDER, IM PF, PREFILL SYR OR SDV, 0.5ML (AFLURIA QUADV, PF)    Medication refill  -     amLODIPine (NORVASC) 5 MG tablet; Take 1 tablet by mouth daily  -     fenofibrate micronized (LOFIBRA) 67 MG capsule; Take 1 capsule by mouth every morning (before breakfast)  -     atorvastatin (LIPITOR) 80 MG tablet; Take 1 tablet by mouth daily  -     famotidine (PEPCID) 20 MG tablet; Take 1 tablet by mouth daily  -     cloNIDine (CATAPRES) 0.2 MG tablet; Take 1 tablet by mouth 2 times daily      FOLLOW UP AND INSTRUCTIONS:  Return if symptoms worsen or fail to improve. · Malloryjoyce Freeman received counseling on the following healthy behaviors:nutrition    · Discussed use, benefit, and side effects of prescribed medications. Barriers to medication compliance addressed. All patient questions answered. Pt voiced understanding. · Patient given educational materials - see patient instructions    Electronically signed by Ashlyn Tuttle PA-C on 2/10/21 at 8:38 AM EST    This note is created with the assistance of a speech-recognition program. While intending to generate a document that actually reflects the content of the visit, the document can still have some mistakes which may not have been identified and corrected by editing.

## 2021-02-21 ENCOUNTER — TELEPHONE (OUTPATIENT)
Dept: PRIMARY CARE CLINIC | Age: 61
End: 2021-02-21

## 2021-02-21 PROBLEM — G81.91 RIGHT HEMIPARESIS (HCC): Status: ACTIVE | Noted: 2021-02-21

## 2021-02-21 PROBLEM — R55 EPISODE OF SYNCOPE: Status: RESOLVED | Noted: 2017-01-22 | Resolved: 2021-02-21

## 2021-02-21 ASSESSMENT — ENCOUNTER SYMPTOMS
NAUSEA: 0
COUGH: 0
BACK PAIN: 0
WHEEZING: 0
VOMITING: 0
CONSTIPATION: 0
DIARRHEA: 0

## 2021-02-24 NOTE — TELEPHONE ENCOUNTER
Health Maintenance updated. Faxed request to medical records at [facility] and [id number] Faxed request to [employer]

## 2021-05-18 DIAGNOSIS — I10 ESSENTIAL HYPERTENSION: ICD-10-CM

## 2021-05-18 DIAGNOSIS — Z76.0 MEDICATION REFILL: ICD-10-CM

## 2021-05-18 RX ORDER — CLONIDINE HYDROCHLORIDE 0.2 MG/1
0.2 TABLET ORAL 2 TIMES DAILY
Qty: 180 TABLET | Refills: 0 | Status: SHIPPED | OUTPATIENT
Start: 2021-05-18 | End: 2021-07-12 | Stop reason: DRUGHIGH

## 2021-05-18 NOTE — TELEPHONE ENCOUNTER
Health Maintenance   Topic Date Due    COVID-19 Vaccine (1) Never done    DTaP/Tdap/Td vaccine (1 - Tdap) Never done    Shingles Vaccine (1 of 2) Never done    Cervical cancer screen  12/01/2016    Diabetic foot exam  03/29/2017    Diabetic microalbuminuria test  03/29/2017    Annual Wellness Visit (AWV)  Never done    Lipid screen  05/20/2020    A1C test (Diabetic or Prediabetic)  05/10/2021    Breast cancer screen  06/24/2021    Potassium monitoring  07/13/2021    Creatinine monitoring  07/13/2021    Diabetic retinal exam  01/20/2022    Pneumococcal 0-64 years Vaccine (2 of 2) 09/16/2025    Colon cancer screen colonoscopy  06/25/2028    Flu vaccine  Completed    Hepatitis C screen  Completed    HIV screen  Completed    Hepatitis A vaccine  Aged Out    Hib vaccine  Aged Out    Meningococcal (ACWY) vaccine  Aged Out             (applicable per patient's age: Cancer Screenings, Depression Screening, Fall Risk Screening, Immunizations)    Hemoglobin A1C (%)   Date Value   02/10/2021 11.0   03/25/2019 9.6   06/04/2018 9.6 (H)     Microalb/Crt.  Ratio (mcg/mg creat)   Date Value   04/04/2015 3,460     LDL Cholesterol (mg/dL)   Date Value   04/04/2015          AST (U/L)   Date Value   04/04/2015 19     ALT (U/L)   Date Value   04/04/2015 24     BUN (mg/dL)   Date Value   04/04/2015 14      (goal A1C is < 7)   (goal LDL is <100) need 30-50% reduction from baseline     BP Readings from Last 3 Encounters:   02/10/21 128/65   02/17/20 109/61   09/16/19 128/70    (goal /80)      All Future Testing planned in [facility]:  Lab Frequency Next Occurrence   XR HIP RIGHT (2-3 VIEWS) Once 05/25/2021   XR LUMBAR SPINE (2-3 VIEWS) Once 05/25/2021       Next Visit Date:  Future Appointments   Date Time Provider Jack Shah   7/12/2021 10:00 AM KLARISSA Boles -  [address]            Patient Active Problem List:     Malignant neoplasm of uterus ([address]     Acid reflux     Heart murmur

## 2021-06-03 ENCOUNTER — CARE COORDINATION (OUTPATIENT)
Dept: CARE COORDINATION | Age: 61
End: 2021-06-03

## 2021-06-03 RX ORDER — LISINOPRIL 40 MG/1
40 TABLET ORAL DAILY
COMMUNITY
End: 2022-06-13 | Stop reason: ALTCHOICE

## 2021-06-03 RX ORDER — INSULIN HUMAN 500 [IU]/ML
25 INJECTION, SOLUTION SUBCUTANEOUS
COMMUNITY

## 2021-06-03 RX ORDER — INSULIN HUMAN 500 [IU]/ML
50 INJECTION, SOLUTION SUBCUTANEOUS
COMMUNITY

## 2021-06-03 RX ORDER — FUROSEMIDE 80 MG
80 TABLET ORAL 2 TIMES DAILY
COMMUNITY
End: 2021-07-12 | Stop reason: DRUGHIGH

## 2021-06-03 NOTE — CARE COORDINATION
Therapy?: No      Ability to seek help/take action for Emergent Urgent situations i.e. fire, crime, inclement weather or health crisis. : Independent  Ability to ambulate to restroom: Independent  Ability handle personal hygeine needs (bathing/dressing/grooming): Needs Assistance  Ability to manage Medications: Independent  Ability to prepare Food Preparation: Dependent  Ability to maintain home (clean home, laundry): Independent  Ability to drive and/or has transportation: Dependent  Ability to do shopping: Dependent  Ability to manage finances: Independent  Is patient able to live independently?: No     Current Housing: Private Residence        Per the Fall Risk Screening, did the patient have 2 or more falls or 1 fall with injury in the past year?: No     Frequent urination at night?: Yes  Do you use rails/bars?: No  Do you have a non-slip tub mat?: Yes     Are you experiencing loss of meaning?: No  Are you experiencing loss of hope and peace?: No     Thinking about your patient's physical health needs, are there any symptoms or problems (risk indicators) you are unsure about that require further investigation?: No identified areas of uncertainly or problems already being investigated   Are the patients physical health problems impacting on their mental well-being?: No identified areas of concern   Are there any problems with your patients lifestyle behaviors (alcohol, drugs, diet, exercise) that are impacting on physical or mental well-being?: Some mild concern of potential negative impact on well-being   Do you have any other concerns about your patients mental well-being?  How would you rate their severity and impact on the patient?: No identified areas of concern   How would you rate their home environment in terms of safety and stability (including domestic violence, insecure housing, neighbor harassment)?: Consistently safe, supportive, stable, no identified problems   How do daily activities impact on the patient's well-being? (include current or anticipated unemployment, work, caregiving, access to transportation or other): No identified problems or perceived positive benefits   How would you rate their social network (family, work, friends)?: Adequate participation with social networks   How would you rate their financial resources (including ability to afford all required medical care)?: Financially secure, resources adequate, no identified problems   How wells does the patient now understand their health and well-being (symptoms, signs or risk factors) and what they need to do to manage their health?: Reasonable to good understanding and already engages in managing health or is willing to undertake better management   How well do you think your patient can engage in healthcare discussions? (Barriers include language, deafness, aphasia, alcohol or drug problems, learning difficulties, concentration): Clear and open communication, no identified barriers   Do other services need to be involved to help this patient?: Other care/services not required at this time   Are current services involved with this patient well-coordinated? (Include coordination with other services you are now recommendation): All required care/services in place and well-coordinated   Suggested Interventions and Community Resources  Diabetes Education: Completed    Other Services or Interventions: An adult sitter while her brother is at work   [employer] Management Tools: Completed         Schedule an appointment with the patient's PCP, Set up/Review an Education Plan, Set up/Review Goals              Prior to Admission medications    Medication Sig Start Date End Date Taking?  Authorizing Provider   lisinopril (PRINIVIL;ZESTRIL) 40 MG tablet Take 40 mg by mouth daily   Yes Historical Provider, MD   insulin regular human (HUMULIN R U-500 KWIKPEN) 500 UNIT/ML SOPN concentrated injection pen Inject 110 Units into the skin every morning (before breakfast) Yes Historical Provider, MD   insulin regular human (HUMULIN R U-500 KWIKPEN) 500 UNIT/ML SOPN concentrated injection pen Inject 25 Units into the skin daily (before lunch)   Yes Historical Provider, MD   insulin regular human (HUMULIN R U-500 KWIKPEN) 500 UNIT/ML SOPN concentrated injection pen Inject 90 Units into the skin Daily with supper   Yes Historical Provider, MD   furosemide (LASIX) 80 MG tablet Take 80 mg by mouth 2 times daily   Yes Historical Provider, MD   cloNIDine (CATAPRES) 0.2 MG tablet Take 1 tablet by mouth 2 times daily 5/18/21  Yes Ramone Singh APRN - CNP   Cholecalciferol (VITAMIN D3) 50 MCG (2000 UT) CAPS TAKE 1 CAPSULE BY MOUTH EVERY DAY 11/24/20  Yes Historical Provider, MD   amLODIPine (NORVASC) 5 MG tablet Take 1 tablet by mouth daily 2/10/21  Yes Saintclair Bach, PA-C   fenofibrate micronized (LOFIBRA) 67 MG capsule Take 1 capsule by mouth every morning (before breakfast) 2/10/21  Yes Saintclair Bach, PA-C   atorvastatin (LIPITOR) 80 MG tablet Take 1 tablet by mouth daily 2/10/21  Yes Saintclair Bach, PA-C   famotidine (PEPCID) 20 MG tablet Take 1 tablet by mouth daily 2/10/21  Yes Saintclair Bach, PA-C   Handicap Placard MISC by Does not apply route 9/16/19  Yes Saintclair Bach, PA-C   Multiple Vitamins-Minerals [facility] SYSTEM ADVANCED 50 PLUS) TABS Take 1 tablet by mouth daily 1/14/19  Yes Saintclair Bach, PA-C   docusate sodium (COLACE) 100 MG capsule Take 100 mg by mouth 2 times daily   Yes Historical Provider, MD   lidocaine (LIDODERM) 5 % Place 1 patch onto the skin daily 12 hours on, 12 hours off. Yes Historical Provider, MD   Misc.  Devices (COMMODE BEDSIDE) [facility] [place] Provide bedside commode covered by insurance 3/21/18  Yes Saintclair Bach, PA-C   aspirin 81 MG chewable tablet Take 81 mg by mouth daily 2/9/18  Yes Historical Provider, MD Tamara Carmen 905 MG/ML SOAJ  1/15/18  Yes Historical Provider, MD   ARTIFICIAL TEARS 0.2-0.2-1 % SOLN  2/9/18  Yes Historical Provider, MD   Ascorbic Acid (VITAMIN C) 1000 MG tablet Take 1,000 mg by mouth daily 2/9/18  Yes Historical Provider, MD   latanoprost (XALATAN) 0.005 % ophthalmic solution Place 1 drop into the right eye daily  12/4/17  Yes Historical Provider, MD   Misc. Devices (TRANSFER BENCH) MISC Use shower transfer bench with back,  every time patient gets in/or out of the shower. Please dispense insurance approved shower transfer bench. 8/15/17  Yes Danyell Jorge MD   carvedilol (COREG) 25 MG tablet Take 25 mg by mouth 2 times daily (with meals)   Yes Historical Provider, MD Thuan Zabala LANCETS 04V [id number] [facility]  10/5/16  Yes Historical Provider, MD   ONE TOUCH ULTRA TEST strip  1/24/16  Yes Historical Provider, MD   Blood Pressure Monitoring (B-D ASSURE BPM/AUTO WRIST CUFF) MISC As directed. Labile hypertension.  1/25/16  Yes Danyell Jorge MD   B-D ULTRAFINE III SHORT PEN 31G X 8 MM MISC  3/15/14  Yes Historical Provider, MD   acetaminophen (TYLENOL 8 HOUR) 650 MG extended release tablet Take 1 tablet by mouth 2 times daily as needed for Pain 2/10/21 2/22/21  Juan Antonio Arriola PA-C   omega-3 acid ethyl esters (LOVAZA) 1 g capsule Take 2 g by mouth 2 times daily    Historical Provider, MD   Compression Stockings MISC Provide compressions stocking covered by insurance, 20 -30 6/4/18   Juan Antonio Arriola PA-C       Future Appointments   Date Time Provider Jack Shah   7/12/2021 10:00 AM [address] [facility]

## 2021-06-07 ENCOUNTER — CARE COORDINATION (OUTPATIENT)
Dept: CARE COORDINATION | Age: 61
End: 2021-06-07

## 2021-06-07 NOTE — CARE COORDINATION
Registered Dietitian Initial Assessment for [address] [facility]  June 7, 2021    Initial Referral Reason: Diabetes    Patient Care Team:  Bg Licona PA-C as PCP - General  Shilo Cox as Consulting Physician (Endocrinology)  KLARISSA Campbell CNP as Nurse Practitioner (Neurology)  Shweta Vance MD as Consulting Physician (Ophthalmology)  Sandy Peña as Ambulatory Care Manager  Emilia Ramírez RD, LD as Dietitian    Patient Active Problem List   Diagnosis    Malignant neoplasm of uterus ([address]    Acid reflux    Heart murmur    Class 3 severe obesity due to excess calories with serious comorbidity and body mass index (BMI) of 40.0 to 44.9 in adult [facility])    Type 2 diabetes mellitus treated with insulin ([address]    Microalbuminuria    Noncompliance with treatment    Hypokalemia    Lymphedema of leg    Primary osteoarthritis of both knees    Bad memory    Unsteady gait    Calculus of gallbladder    Weakness of left side of body    AF (amaurosis fugax)    Chronic kidney disease    Abnormal liver enzymes    Ataxic aphasia    Herpes simplex type 2 infection    Temporary cerebral vascular dysfunction    Moderate or severe vision impairment, both eyes    Familial hyperlipidemia    Essential hypertension    Occipital cerebral infarction ([address]    Drug overdose    Hypersomnolence    Ischemic stroke ([address]    Right hemiparesis (HCC)       Current Outpatient Medications   Medication Sig Dispense Refill    lisinopril (PRINIVIL;ZESTRIL) 40 MG tablet Take 40 mg by mouth daily      insulin regular human (HUMULIN R U-500 KWIKPEN) 500 UNIT/ML SOPN concentrated injection pen Inject 110 Units into the skin every morning (before breakfast)      insulin regular human (HUMULIN R U-500 KWIKPEN) 500 UNIT/ML SOPN concentrated injection pen Inject 25 Units into the skin daily (before lunch)      insulin regular human (HUMULIN R U-500 KWIKPEN) 500 UNIT/ML SOPN concentrated Estimated daily CHO Needs: 60 gms/meal, 15gms/snack  Protein needs: 65gms/d  Fluid needs: 2400cc/day      Patient Goals:  1. Discussed what foods contain carbohydrate, what a serving size is of carbs and how to measure servings out to limit carbohydrates at meals and snacks.  Goal is 60gms of carb/meal, 15-30gms/snack. Low carb snacking discussed- list of snacks will be mailed to patient's home. 2. Patient will eat 3 meals daily spaced every 4-5 hours. Discussed importance of not going too long between meals, patient sometimes skips lunch. Discussed quick/easy lunch suggestions. We discussed Glucerna when going too long between meals, patient would like to try it- will send coupons. 3. Reviewed using plate method at meals and working on increasing non-starchy vegetables and lean protein sources. Encouraged to increase lean meats, eggs, cottage cheese, nut/seeds.  Reviewed what non-starchy vegetables are and encouraged patient to make 1/2  plate non-starchy vegetables, 1/4 lean protein, 1/4 carbs at meals. Foods from each category reviewed along with serving size.    4. Patient relays drinks mostly water and sugar free juice, avoid sugary drinks. Patient admits she does eat sweets at times, we discussed alternatives to sweets- sugar free pudding, jello, ect. Encouraged patient to keep sweets out of the house as much as possible and limit to once a week or less. Nutrition Intervention Need:  Initial/Brief:  Comprehensive Nutrition Education: X  Coordination of other care during nutrition care:    Monitoring and Evaluation:  Ability to plan meals/snacks: X  Ability to select healthy foods/meals: x  Food and Nutrition Knowledge:X  Self Monitoring:  Physical Activity:  Energy intake:  Fluid/beverage intake:  Fat/chol intake:  Carb intake: X  Other:    Plan:  1.  Will continue to educate patient on reading food labels, portion control, plate method, carb counting, low carb snacking, importance of meal pattern, diabetic friendly meal ideas and cutting out sweets and sweetened drinks. Patient will be provided/mailed nutrition information on all the above discussed and coupons to purchase Glucerna. 2. Will follow up with patient in 2-3 weeks to review nutrition information and answer questions.      PRIYA Vance

## 2021-06-10 ENCOUNTER — CARE COORDINATION (OUTPATIENT)
Dept: CARE COORDINATION | Age: 61
End: 2021-06-10

## 2021-06-22 ENCOUNTER — CARE COORDINATION (OUTPATIENT)
Dept: CARE COORDINATION | Age: 61
End: 2021-06-22

## 2021-06-22 NOTE — CARE COORDINATION
Nutrition Care Coordinator Follow-Up visit:    Food Recall: eating 2-3 meals/d    Activity Level:  Sedentary: X  Lightly Active: Moderately Active:  Very Active:    Adult BMI:  Underweight (below 18.5)  Normal Weight (18.5-24.9)  Overweight (25-29. 9)  Obese (30-39. 9)  Morbidly Obese (>40) X    Weight Change: no change    Plan:  Plan was established with patient:  Increase dietary fiber by consuming whole grains, fruits and vegetables: X  Limit dietary cholesterol to >200mg/day: Increase water intake:  Avoid added sugar: X  Avoid sweetened beverages: X  Choose lean meats:      Monitoring: Will monitor weight:  Will monitor adherence to meal plan: Will monitor adherence to exercise plan: Will monitor HGA1c: X    Handouts Provided :  Low Carb snacking: X  Carb counting /individual meal plan: X  Portion Control: X  Food Labels: X  Physical Activity:  Low Fat/Cholesterol:  Hypo/Hyperglycemia: X  Calorie Controlled Meal Plan:    Goals: Increase water consumption to 8oz. 6-8 times daily:  Manage blood sugars by consuming 3 meals spaced every 4-5 hours with 2-3 snacks daily: discussed  Increase fiber and decrease fat intake by consuming 1-2 fruit servings and 2-3 vegetable servings per day.   Increase physical activity by: encouraged daily walking as tolerated  Consume less than 2,000mg of sodium/day  Avoid consumption of sweetened beverages and added sugar by reading food labels:discussed  Monitor blood sugars by using meter to check blood glucose before morning meal and 2 hours after a meal daily: -120's mostly, did have a low sugar recently- discussed prevention and treatment of hypoglycemia  Decrease risk of coronary heart disease by consuming fish that contains omega-3 fatty acids at least twice a week, avoiding partially hydrogenated oil/trans fats and limiting saturated fat intake by reading food labels:    Patient goals set:  1.  Reviewed what foods contain carbohydrate, what a serving size is of

## 2021-06-24 ENCOUNTER — TELEPHONE (OUTPATIENT)
Dept: PRIMARY CARE CLINIC | Age: 61
End: 2021-06-24

## 2021-06-24 NOTE — TELEPHONE ENCOUNTER
Jong Rosario from [facility] called to give result from her yearly physical. The Quanta torres test was mild result on right 0.95 left 0.86 moderate.    Patient will bring hard copy of visit to next appointment

## 2021-06-29 DIAGNOSIS — Z76.0 MEDICATION REFILL: ICD-10-CM

## 2021-06-29 DIAGNOSIS — I10 ESSENTIAL HYPERTENSION: ICD-10-CM

## 2021-06-30 ENCOUNTER — CARE COORDINATION (OUTPATIENT)
Dept: CARE COORDINATION | Age: 61
End: 2021-06-30

## 2021-06-30 RX ORDER — AMLODIPINE BESYLATE 5 MG/1
5 TABLET ORAL DAILY
Qty: 90 TABLET | Refills: 0 | Status: SHIPPED | OUTPATIENT
Start: 2021-06-30 | End: 2022-04-22

## 2021-06-30 NOTE — CARE COORDINATION
UNIT/ML SOPN concentrated injection pen Inject 110 Units into the skin every morning (before breakfast)    Historical Provider, MD   insulin regular human (HUMULIN R U-500 KWIKPEN) 500 UNIT/ML SOPN concentrated injection pen Inject 25 Units into the skin daily (before lunch)    Historical Provider, MD   insulin regular human (HUMULIN R U-500 KWIKPEN) 500 UNIT/ML SOPN concentrated injection pen Inject 90 Units into the skin Daily with supper    Historical Provider, MD   furosemide (LASIX) 80 MG tablet Take 80 mg by mouth 2 times daily    Historical Provider, MD   cloNIDine (CATAPRES) 0.2 MG tablet Take 1 tablet by mouth 2 times daily 5/18/21   Julita Pagan, APRN - CNP   Cholecalciferol (VITAMIN D3) 50 MCG (2000 UT) CAPS TAKE 1 CAPSULE BY MOUTH EVERY DAY 11/24/20   Historical Provider, MD   fenofibrate micronized (LOFIBRA) 67 MG capsule Take 1 capsule by mouth every morning (before breakfast) 2/10/21   Ava Valencia PA-C   atorvastatin (LIPITOR) 80 MG tablet Take 1 tablet by mouth daily 2/10/21   Ava Valencia PA-C   famotidine (PEPCID) 20 MG tablet Take 1 tablet by mouth daily 2/10/21   Ava Valencia PA-C   acetaminophen (TYLENOL 8 HOUR) 650 MG extended release tablet Take 1 tablet by mouth 2 times daily as needed for Pain 2/10/21 2/22/21  Ava Valencia PA-C   Handicap Placard MISC by Does not apply route 9/16/19   Ava Valencia PA-C   Multiple Vitamins-Minerals [facility] SYSTEM ADVANCED 50 PLUS) TABS Take 1 tablet by mouth daily 1/14/19   Ava Valencia PA-C   docusate sodium (COLACE) 100 MG capsule Take 100 mg by mouth 2 times daily    Historical Provider, MD   omega-3 acid ethyl esters (LOVAZA) 1 g capsule Take 2 g by mouth 2 times daily    Historical Provider, MD   lidocaine (LIDODERM) 5 % Place 1 patch onto the skin daily 12 hours on, 12 hours off.     Historical Provider, MD   Compression Stockings MISC Provide compressions stocking covered by insurance, 20 -30 6/4/18   Rondell Oppenheim Zehra Stover PA-C   [facility]. Devices (COMMODE BEDSIDE) [facility] Provide bedside commode covered by insurance 3/21/18   Samanta Combs PA-C   aspirin 81 MG chewable tablet Take 81 mg by mouth daily 2/9/18   Historical Provider, MD Arana Flair 170 MG/ML SOAJ  1/15/18   Historical Provider, MD   ARTIFICIAL TEARS 0.2-0.2-1 % SOLN  2/9/18   Historical Provider, MD   Ascorbic Acid (VITAMIN C) 1000 MG tablet Take 1,000 mg by mouth daily 2/9/18   Historical Provider, MD   latanoprost (XALATAN) 0.005 % ophthalmic solution Place 1 drop into the right eye daily  12/4/17   Historical Provider, MD   Misc. Devices (TRANSFER BENCH) MISC Use shower transfer bench with back,  every time patient gets in/or out of the shower. Please dispense insurance approved shower transfer bench. 8/15/17   Dann Zamudio MD   carvedilol (COREG) 25 MG tablet Take 25 mg by mouth 2 times daily (with meals)    Historical Provider, MD Alvarez Iron LANCETS 61G [address] [facility]  10/5/16   Historical Provider, MD   ONE TOUCH ULTRA TEST strip  1/24/16   Historical Provider, MD   Blood Pressure Monitoring (B-D ASSURE BPM/AUTO WRIST CUFF) MISC As directed. Labile hypertension.  1/25/16   Dann Zamudio MD   B-D ULTRAFINE III SHORT PEN 31G X 8 MM MISC  3/15/14   Historical Provider, MD       Future Appointments   Date Time Provider Jack Shah   7/12/2021 10:00 AM KLARISSA Dhillon - [address] [facility]

## 2021-07-12 ENCOUNTER — OFFICE VISIT (OUTPATIENT)
Dept: PRIMARY CARE CLINIC | Age: 61
End: 2021-07-12
Payer: MEDICARE

## 2021-07-12 VITALS
TEMPERATURE: 97.3 F | WEIGHT: 274.2 LBS | HEART RATE: 63 BPM | SYSTOLIC BLOOD PRESSURE: 140 MMHG | DIASTOLIC BLOOD PRESSURE: 66 MMHG | OXYGEN SATURATION: 98 % | BODY MASS INDEX: 45.63 KG/M2

## 2021-07-12 DIAGNOSIS — C55 MALIGNANT NEOPLASM OF UTERUS, UNSPECIFIED SITE (HCC): ICD-10-CM

## 2021-07-12 DIAGNOSIS — Z76.0 MEDICATION REFILL: ICD-10-CM

## 2021-07-12 DIAGNOSIS — Z12.31 ENCOUNTER FOR SCREENING MAMMOGRAM FOR BREAST CANCER: ICD-10-CM

## 2021-07-12 DIAGNOSIS — E11.42 TYPE 2 DIABETES MELLITUS WITH DIABETIC POLYNEUROPATHY, WITH LONG-TERM CURRENT USE OF INSULIN (HCC): Primary | ICD-10-CM

## 2021-07-12 DIAGNOSIS — I10 ESSENTIAL HYPERTENSION: ICD-10-CM

## 2021-07-12 DIAGNOSIS — Z13.220 SCREENING FOR HYPERLIPIDEMIA: ICD-10-CM

## 2021-07-12 DIAGNOSIS — N18.9 CHRONIC KIDNEY DISEASE, UNSPECIFIED CKD STAGE: ICD-10-CM

## 2021-07-12 DIAGNOSIS — Z79.4 TYPE 2 DIABETES MELLITUS WITH DIABETIC POLYNEUROPATHY, WITH LONG-TERM CURRENT USE OF INSULIN (HCC): Primary | ICD-10-CM

## 2021-07-12 LAB — HBA1C MFR BLD: 10.3 %

## 2021-07-12 PROCEDURE — 99214 OFFICE O/P EST MOD 30 MIN: CPT | Performed by: NURSE PRACTITIONER

## 2021-07-12 PROCEDURE — G8427 DOCREV CUR MEDS BY ELIG CLIN: HCPCS | Performed by: NURSE PRACTITIONER

## 2021-07-12 PROCEDURE — 83036 HEMOGLOBIN GLYCOSYLATED A1C: CPT | Performed by: NURSE PRACTITIONER

## 2021-07-12 PROCEDURE — 1036F TOBACCO NON-USER: CPT | Performed by: NURSE PRACTITIONER

## 2021-07-12 PROCEDURE — 3046F HEMOGLOBIN A1C LEVEL >9.0%: CPT | Performed by: NURSE PRACTITIONER

## 2021-07-12 PROCEDURE — G8417 CALC BMI ABV UP PARAM F/U: HCPCS | Performed by: NURSE PRACTITIONER

## 2021-07-12 PROCEDURE — 2022F DILAT RTA XM EVC RTNOPTHY: CPT | Performed by: NURSE PRACTITIONER

## 2021-07-12 PROCEDURE — 3017F COLORECTAL CA SCREEN DOC REV: CPT | Performed by: NURSE PRACTITIONER

## 2021-07-12 RX ORDER — FUROSEMIDE 80 MG
80 TABLET ORAL SEE ADMIN INSTRUCTIONS
Qty: 60 TABLET | Refills: 0 | Status: SHIPPED
Start: 2021-07-12 | End: 2022-04-28 | Stop reason: DRUGHIGH

## 2021-07-12 RX ORDER — CLONIDINE HYDROCHLORIDE 0.3 MG/1
0.3 TABLET ORAL 3 TIMES DAILY
Qty: 90 TABLET | Refills: 0 | Status: SHIPPED
Start: 2021-07-12 | End: 2022-08-10 | Stop reason: SDUPTHER

## 2021-07-12 SDOH — ECONOMIC STABILITY: FOOD INSECURITY: WITHIN THE PAST 12 MONTHS, YOU WORRIED THAT YOUR FOOD WOULD RUN OUT BEFORE YOU GOT MONEY TO BUY MORE.: NEVER TRUE

## 2021-07-12 SDOH — ECONOMIC STABILITY: FOOD INSECURITY: WITHIN THE PAST 12 MONTHS, THE FOOD YOU BOUGHT JUST DIDN'T LAST AND YOU DIDN'T HAVE MONEY TO GET MORE.: NEVER TRUE

## 2021-07-12 ASSESSMENT — ENCOUNTER SYMPTOMS: BLURRED VISION: 1

## 2021-07-12 ASSESSMENT — PATIENT HEALTH QUESTIONNAIRE - PHQ9
SUM OF ALL RESPONSES TO PHQ QUESTIONS 1-9: 0
SUM OF ALL RESPONSES TO PHQ9 QUESTIONS 1 & 2: 0
1. LITTLE INTEREST OR PLEASURE IN DOING THINGS: 0
SUM OF ALL RESPONSES TO PHQ QUESTIONS 1-9: 0
2. FEELING DOWN, DEPRESSED OR HOPELESS: 0
SUM OF ALL RESPONSES TO PHQ QUESTIONS 1-9: 0

## 2021-07-12 ASSESSMENT — SOCIAL DETERMINANTS OF HEALTH (SDOH): HOW HARD IS IT FOR YOU TO PAY FOR THE VERY BASICS LIKE FOOD, HOUSING, MEDICAL CARE, AND HEATING?: NOT HARD AT ALL

## 2021-07-12 NOTE — PROGRESS NOTES
[facility]  [id number] [facility] [address]  Dept: [phone]  Dept Fax: [phone]    Patient Care Team:  KLARISSA Victoria CNP as PCP - General (Family Medicine)  Jassi Hernandez as Consulting Physician (Endocrinology)  [place] KLARISSA Mccall CNP as Nurse Practitioner (Neurology)  Jes Olsen MD as Consulting Physician (Ophthalmology)  Idris Mckinley as Ambulatory Care Manager  Bartolo Estrada RD, LD as Dietitian    2021     Arthur Liu (:  )DD a 61 y.o. female, here for evaluation of the following medical concerns:   Chief Complaint   Patient presents with    Follow-up     5M    Leg Swelling     Bilateral swelling       Ms. Kash Eduardo is here today to establish care and follow-up for diabetes hypertension and chronic conditions. Last visit with former PCP was 2021. Patient was previously seen by endocrinology, had not seen them in 1 year. She also follows with nephrology for chronic any disease. A1c 11 as of February. Had a visit with Nephrology last month, did blood work and an [address] of her kidney. Lowered dosing of carvedilol but she has been taking that plus her old dose, cannot see labels  Switched her lasix to 80 mg and morning and 40 mg in PM  Also inc clonidine to 0.3mg three times a day    Follows with eye doctor, goes back on . Used to get injections. States she bleeding behind the eyes has stopped    Has had 2 appt cancelled by Endocrinology since Feb, has an upcoming appt this month. See's [facility]  States she was taken off oral medications  Takes U-500 insulin before meals  Also on an injectable twice monthly for cholesterol    Hx of uterine cancer, had a hysterectomy. States she was told she did not have to follow up with GYN anymore. Will get a pulling sensation in her right groin at times.  She has 1 ovary, not sure which side  Surgery was done in [facility]    Hypertension  This is a chronic problem. The problem is unchanged. The problem is controlled. Associated symptoms include blurred vision, peripheral edema and sweats. Risk factors for coronary artery disease include obesity, post-menopausal state and diabetes mellitus. Past treatments include diuretics, ACE inhibitors and beta blockers. Hypertensive end-organ damage includes kidney disease and CVA. Diabetes  She presents for her follow-up diabetic visit. She has type 2 diabetes mellitus. Her disease course has been stable. Hypoglycemia symptoms include sweats. Associated symptoms include blurred vision. There are no hypoglycemic complications. Symptoms are stable. Diabetic complications include a CVA. Current diabetic treatment includes insulin injections. .    Review of Systems   Eyes: Positive for blurred vision. Prior to Visit Medications    Medication Sig Taking?  Authorizing Provider   cloNIDine (CATAPRES) 0.3 MG tablet Take 1 tablet by mouth 3 times daily Yes KLARISSA Mendenhall CNP   furosemide (LASIX) 80 MG tablet Take 1 tablet by mouth See Admin Instructions 80 mg in AM, 40 mg in PM Yes KLARISSA Mendenhall CNP   amLODIPine (NORVASC) 5 MG tablet Take 1 tablet by mouth daily  Patient taking differently: Take 10 mg by mouth daily  Yes KLARISSA Mendenhall CNP   lisinopril (PRINIVIL;ZESTRIL) 40 MG tablet Take 40 mg by mouth daily Yes Historical Provider, MD   insulin regular human (HUMULIN R U-500 KWIKPEN) 500 UNIT/ML SOPN concentrated injection pen Inject 110 Units into the skin every morning (before breakfast) Yes Historical Provider, MD   insulin regular human (HUMULIN R U-500 KWIKPEN) 500 UNIT/ML SOPN concentrated injection pen Inject 25 Units into the skin daily (before lunch) Yes Historical Provider, MD   insulin regular human (HUMULIN R U-500 KWIKPEN) 500 UNIT/ML SOPN concentrated injection pen Inject 90 Units into the skin Daily with supper Yes Historical Provider, MD   Cholecalciferol (VITAMIN D3) 50 MCG (2000 UT) CAPS TAKE 1 CAPSULE BY MOUTH EVERY DAY Yes Historical Provider, MD   fenofibrate micronized (LOFIBRA) 67 MG capsule Take 1 capsule by mouth every morning (before breakfast) Yes Mac Mujica PA-C   atorvastatin (LIPITOR) 80 MG tablet Take 1 tablet by mouth daily Yes Mac Mujica PA-C   famotidine (PEPCID) 20 MG tablet Take 1 tablet by mouth daily Yes Mac Mujica PA-C   acetaminophen (TYLENOL 8 HOUR) 650 MG extended release tablet Take 1 tablet by mouth 2 times daily as needed for Pain Yes Mac Mujica PA-C   Handicap Placard [address] [facility] by Does not apply route Yes Mac Mujica PA-C   Multiple Vitamins-Minerals (THERAGRAN-M ADVANCED 50 PLUS) TABS Take 1 tablet by mouth daily Yes Mac Mujica PA-C   docusate sodium (COLACE) 100 MG capsule Take 100 mg by mouth 2 times daily Yes Historical Provider, MD   omega-3 acid ethyl esters (LOVAZA) 1 g capsule Take 2 g by mouth 2 times daily Yes Historical Provider, MD   lidocaine (LIDODERM) 5 % Place 1 patch onto the skin daily 12 hours on, 12 hours off. Yes Historical Provider, MD   Compression Stockings MISC Provide compressions stocking covered by insurance, 20 -30 Yes Mac Mujica PA-C   [facility] Devices (COMMODE BEDSIDE) [facility] Provide bedside commode covered by insurance Yes Mac Mujica PA-C   aspirin 81 MG chewable tablet Take 81 mg by mouth daily Yes Historical Provider, MD [address] MG/ML SOAJ  Yes Historical Provider, MD   ARTIFICIAL TEARS 0.2-0.2-1 % SOLN  Yes Historical Provider, MD   Ascorbic Acid (VITAMIN C) 1000 MG tablet Take 1,000 mg by mouth daily Yes Historical Provider, MD   latanoprost (XALATAN) 0.005 % ophthalmic solution Place 1 drop into the right eye daily  Yes Historical Provider, MD   Misc. Devices (TRANSFER BENCH) MISC Use shower transfer bench with back,  every time patient gets in/or out of the shower.  Please dispense insurance approved shower transfer bench. Yes Kenny Chacon MD   carvedilol (COREG) 6.25 MG tablet Take 25 mg by mouth 2 times daily (with meals)  Yes Historical Provider, MD Guo Magdalena LANCGLYNN 45M [address] [facility]  Yes Historical Provider, MD   ONE TOUCH ULTRA TEST strip  Yes Historical Provider, MD   Blood Pressure Monitoring (B-D ASSURE BPM/AUTO WRIST CUFF) MISC As directed. Labile hypertension. Yes Kenny Chacon MD   B-D ULTRAFINE III SHORT PEN 31G X 8 MM MISC  Yes Historical Provider, MD        Social History     Tobacco Use    Smoking status: Never Smoker    Smokeless tobacco: Never Used   Substance Use Topics    Alcohol use: No        Vitals:    07/12/21 1006 07/12/21 1058   BP: (!) 156/67 (!) 140/66   Site: Right Upper Arm    Position: Sitting    Cuff Size: Large Adult    Pulse: 67 63   Temp: 97.3 °F (36.3 °C)    TempSrc: Temporal    SpO2: 98%    Weight: 274 lb 3.2 oz (124.4 kg)      Estimated body mass index is 45.63 kg/m² as calculated from the following:    Height as of 2/10/21: 5' 5\" (1.651 m). Weight as of this encounter: 274 lb 3.2 oz (124.4 kg). DIAGNOSTIC FINDINGS:  CBC:  Lab Results   Component Value Date    WBC 5.4 04/04/2015    HGB 14.2 04/04/2015     04/04/2015       BMP:    Lab Results   Component Value Date     04/04/2015    K 3.8 07/13/2020    K 4.2 04/04/2015    CL 96 04/04/2015    CO2 25 04/04/2015    BUN 14 04/04/2015    CREATININE 1.7 07/13/2020    GLUCOSE 204 08/03/2017       HEMOGLOBIN A1C:   Lab Results   Component Value Date    LABA1C 10.3 07/12/2021       FASTING LIPID PANEL:  Lab Results   Component Value Date    CHOL 291 (H) 04/04/2015    HDL 42 04/04/2015    TRIG 502 (H) 04/04/2015       Physical Exam  Vitals reviewed. Constitutional:       Appearance: Normal appearance. She is well-developed and well-groomed. She is obese. HENT:      Head: Normocephalic and atraumatic.       Right Ear: External ear normal.      Left Ear: External ear normal.      Nose: Nose normal. Eyes:      General: Lids are normal. No scleral icterus. Conjunctiva/sclera: Conjunctivae normal.      Pupils: Pupils are equal, round, and reactive to light. Cardiovascular:      Rate and Rhythm: Normal rate and regular rhythm. Heart sounds: Normal heart sounds. Pulmonary:      Effort: Pulmonary effort is normal.      Breath sounds: Normal breath sounds. No decreased air movement. Abdominal:      General: Bowel sounds are normal.      Palpations: Abdomen is soft. There is no mass. Tenderness: There is no abdominal tenderness. Musculoskeletal:      Cervical back: Normal range of motion and neck supple. Right hip: Tenderness and bony tenderness present. Decreased range of motion. Normal strength. Left hip: No tenderness or bony tenderness. Normal range of motion. Normal strength. Right lower le+ Pitting Edema present. Left lower le+ Pitting Edema present. Skin:     General: Skin is warm and dry. Neurological:      Mental Status: She is alert and oriented to person, place, and time. Gait: Gait abnormal.   Psychiatric:         Behavior: Behavior is cooperative. Thought Content: Thought content normal.         Judgment: Judgment normal.         ASSESSMENT     Diagnosis Orders   1. Type 2 diabetes mellitus with diabetic polyneuropathy, with long-term current use of insulin ([facility])  POCT glycosylated hemoglobin (Hb A1C)   2. Encounter for screening mammogram for breast cancer  EDITH Digital Screen Bilateral [[id number]]   3. Screening for hyperlipidemia  Lipid, Fasting   4. Essential hypertension  cloNIDine (CATAPRES) 0.3 MG tablet   5. Malignant neoplasm of uterus, unspecified site ([facility]  US PELVIS COMPLETE   6. Chronic kidney disease, unspecified CKD stage     7.  Medication refill  cloNIDine (CATAPRES) 0.3 MG tablet          PLAN:  Orders Placed This Encounter   Medications    cloNIDine (CATAPRES) 0.3 MG tablet     Sig: Take 1 tablet by mouth 3 times daily Dispense:  90 tablet     Refill:  0    furosemide (LASIX) 80 MG tablet     Sig: Take 1 tablet by mouth See Admin Instructions 80 mg in AM, 40 mg in PM     Dispense:  60 tablet     Refill:  0         1. Operative report reviewed from [facility] dated August 18, 2003. Preoperative diagnosis of abnormal uterine bleeding, high-grade squamous intraepithelial lesion of the cervix, fibroid uterus and chronic pelvic pain. Patient had a total abdominal hysterectomy with right oophorectomy and lysis of adhesions. Uterus and cervix were removed intact. Pathology results requested. 2. Medication list updated, including frusemide dosing and Catapres dosing. Patient educated only to continue with carvedilol 6.25 mg twice daily, apparently recently decreased by nephrology from 25 mg twice daily. 3. Mammogram due, ordered at this time. 4. Blood pressure at her baseline. Lungs clear bilaterally upon exam  5. A1c minimally improved compared to check in February, however patient does have follow-up with ED CC this month. 6. Patient complaining of pelvic pain, will evaluate with ultrasound given history of adhesions. History was reviewed, cannot find documentation in [facility] of uterine cancer. FOLLOW UP AND INSTRUCTIONS:  Return in about 6 months (around 1/12/2022) for Diabetes, HTN. · Angelo Castillo received counseling on the following healthy behaviors:nutrition and medication adherence    · Discussed use, benefit, and side effects of prescribed medications. Barriers to  medication compliance addressed. All patient questions answered. Pt  verbalized understanding of all instructions given. · Patient given educational materials - see patient instructions      · Patient advised to contact scheduling offices for any referrals or imaging orders  placed today if they have not been contacted in 48 hours. Return in about 6 months (around 1/12/2022) for Diabetes, HTN.     An electronic signature was used to authenticate this note. --KLARISSA Victoria CNP on 7/12/2021 at 12:42 PM  Visit Information    Have you changed or started any medications since your last visit including any over-the-counter medicines, vitamins, or herbal medicines? no   Are you having any side effects from any of your medications? -  no  Have you stopped taking any of your medications? Is so, why? -  no    Have you seen any other physician or provider since your last visit? No  Have you had any other diagnostic tests since your last visit? No  Have you been seen in the emergency room and/or had an admission to a hospital since we last saw you? No  Have you had your routine dental cleaning in the past 6 months? no    Have you activated your [employer] account? If not, what are your barriers?  Yes     Patient Care Team:  KLARISSA Victoria CNP as PCP - General (Family Medicine)  Jassi Hernandez as Consulting Physician (Endocrinology)  KLARISSA Kelley CNP as Nurse Practitioner (Neurology)  Jes Olsen MD as Consulting Physician (Ophthalmology)  Idris Mckinley as Ambulatory Care Manager  Bartolo Estrada RD, LD as Dietitian    Medical History Review  Past Medical, Family, and Social History reviewed and does not contribute to the patient presenting condition    Health Maintenance   Topic Date Due    COVID-19 Vaccine (1) Never done    DTaP/Tdap/Td vaccine (1 - Tdap) Never done    Shingles Vaccine (1 of 2) Never done    Cervical cancer screen  12/01/2016    Diabetic foot exam  03/29/2017    Annual Wellness Visit (AWV)  Never done    Lipid screen  05/20/2020    Breast cancer screen  06/24/2021    Potassium monitoring  07/13/2021    Creatinine monitoring  07/13/2021    Flu vaccine (1) 09/01/2021    A1C test (Diabetic or Prediabetic)  10/12/2021    Diabetic retinal exam  01/20/2022    Pneumococcal 0-64 years Vaccine (2 of 2 - PPSV23) 09/16/2025    Colon cancer screen colonoscopy  06/25/2028    Hepatitis C screen  Completed    HIV screen  Completed    Hepatitis A vaccine  Aged Out    Hib vaccine  Aged Out    Meningococcal (ACWY) vaccine  Aged Out

## 2021-07-13 ENCOUNTER — CARE COORDINATION (OUTPATIENT)
Dept: CARE COORDINATION | Age: 61
End: 2021-07-13

## 2021-07-13 SDOH — ECONOMIC STABILITY: TRANSPORTATION INSECURITY
IN THE PAST 12 MONTHS, HAS LACK OF TRANSPORTATION KEPT YOU FROM MEETINGS, WORK, OR FROM GETTING THINGS NEEDED FOR DAILY LIVING?: NO

## 2021-07-13 SDOH — ECONOMIC STABILITY: TRANSPORTATION INSECURITY
IN THE PAST 12 MONTHS, HAS THE LACK OF TRANSPORTATION KEPT YOU FROM MEDICAL APPOINTMENTS OR FROM GETTING MEDICATIONS?: NO

## 2021-07-13 SDOH — ECONOMIC STABILITY: HOUSING INSECURITY: IN THE LAST 12 MONTHS, HOW MANY PLACES HAVE YOU LIVED?: 1

## 2021-07-13 SDOH — ECONOMIC STABILITY: HOUSING INSECURITY
IN THE LAST 12 MONTHS, WAS THERE A TIME WHEN YOU DID NOT HAVE A STEADY PLACE TO SLEEP OR SLEPT IN A SHELTER (INCLUDING NOW)?: NO

## 2021-07-13 SDOH — ECONOMIC STABILITY: INCOME INSECURITY: IN THE LAST 12 MONTHS, WAS THERE A TIME WHEN YOU WERE NOT ABLE TO PAY THE MORTGAGE OR RENT ON TIME?: NO

## 2021-07-13 ASSESSMENT — LIFESTYLE VARIABLES: HOW OFTEN DO YOU HAVE A DRINK CONTAINING ALCOHOL: NEVER

## 2021-07-13 ASSESSMENT — SOCIAL DETERMINANTS OF HEALTH (SDOH)
HOW OFTEN DO YOU ATTENT MEETINGS OF THE CLUB OR ORGANIZATION YOU BELONG TO?: NEVER
HOW OFTEN DO YOU ATTEND CHURCH OR RELIGIOUS SERVICES?: MORE THAN 4 TIMES PER YEAR
DO YOU BELONG TO ANY CLUBS OR ORGANIZATIONS SUCH AS CHURCH GROUPS UNIONS, FRATERNAL OR ATHLETIC GROUPS, OR SCHOOL GROUPS?: NO
ARE YOU MARRIED, WIDOWED, DIVORCED, SEPARATED, NEVER MARRIED, OR LIVING WITH A PARTNER?: NEVER MARRIED

## 2021-07-13 NOTE — CARE COORDINATION
Ambulatory Care Coordination Note  7/13/2021  CM Risk Score: 5  Charlson 10 Year Mortality Risk Score: 100%     ACC: Gladys Mobley    Summary Note: Marci Richmond denies any c/o today. She reports she is checking her blood pressure daily but is not logging it. She sees Dr. Kandice Nur again on 8.5. 21. CM suggested she start writing them down and take the log with her to that appointment. She is in agreement. Plan:  F/U on blood pressures in 1 week. Diabetes Assessment    Medic Alert ID: No  Meal Planning: Carb counting, Avoidance of concentrated sweets, Other   How often do you test your blood sugar?: Meals   Do you have barriers with adherence to non-pharmacologic self-management interventions? (Nutrition/Exercise/Self-Monitoring): Yes   Have you ever had to go to the ED for symptoms of low blood sugar?: Yes       No patient-reported symptoms   Do you have hyperglycemia symptoms?: No   Do you have hypoglycemia symptoms?: No   Last Blood Sugar Value: 145   Blood Sugar Monitoring Regimen: Before Meals   Blood Sugar Trends: No Change              Care Coordination Interventions    Program Enrollment: Complex Care  Referral from Primary Care Provider: No  Suggested Interventions and Community Resources  Diabetes Education: Completed  Zone Management Tools: Completed  Other Services or Interventions: An adult sitter while her brother is at work         Goals Addressed    None         Prior to Admission medications    Medication Sig Start Date End Date Taking?  Authorizing Provider   cloNIDine (CATAPRES) 0.3 MG tablet Take 1 tablet by mouth 3 times daily 7/12/21 8/11/21  Joey Howard APRN - CNP   furosemide (LASIX) 80 MG tablet Take 1 tablet by mouth See Admin Instructions 80 mg in AM, 40 mg in PM 7/12/21   KLARISSA Suggs - CNP   amLODIPine (NORVASC) 5 MG tablet Take 1 tablet by mouth daily  Patient taking differently: Take 10 mg by mouth daily  6/30/21   Joey Howard APRN - CNP   lisinopril (PRINIVIL;ZESTRIL) 40 MG tablet Take 40 mg by mouth daily    Historical Provider, MD   insulin regular human (HUMULIN R U-500 KWIKPEN) 500 UNIT/ML SOPN concentrated injection pen Inject 110 Units into the skin every morning (before breakfast)    Historical Provider, MD   insulin regular human (HUMULIN R U-500 KWIKPEN) 500 UNIT/ML SOPN concentrated injection pen Inject 25 Units into the skin daily (before lunch)    Historical Provider, MD   insulin regular human (HUMULIN R U-500 KWIKPEN) 500 UNIT/ML SOPN concentrated injection pen Inject 90 Units into the skin Daily with supper    Historical Provider, MD   Cholecalciferol (VITAMIN D3) 50 MCG (2000 UT) CAPS TAKE 1 CAPSULE BY MOUTH EVERY DAY 11/24/20   Historical Provider, MD   fenofibrate micronized (LOFIBRA) 67 MG capsule Take 1 capsule by mouth every morning (before breakfast) 2/10/21   Bethany Pen, CHARU   atorvastatin (LIPITOR) 80 MG tablet Take 1 tablet by mouth daily 2/10/21   Bethany Pen, CHARU   famotidine (PEPCID) 20 MG tablet Take 1 tablet by mouth daily 2/10/21   Bethany Pen, CHARU   acetaminophen (TYLENOL 8 HOUR) 650 MG extended release tablet Take 1 tablet by mouth 2 times daily as needed for Pain 2/10/21 7/12/21  Bethany CHARU Fleming   Handicap Placard MISC by Does not apply route 9/16/19   Bethany Pen, CHARU   Multiple Vitamins-Minerals [facility] SYSTEM ADVANCED 50 PLUS) TABS Take 1 tablet by mouth daily 1/14/19   Bethany CHARU Fleming   docusate sodium (COLACE) 100 MG capsule Take 100 mg by mouth 2 times daily    Historical Provider, MD   omega-3 acid ethyl esters (LOVAZA) 1 g capsule Take 2 g by mouth 2 times daily    Historical Provider, MD   lidocaine (LIDODERM) 5 % Place 1 patch onto the skin daily 12 hours on, 12 hours off. Historical Provider, MD   Compression Stockings MISC Provide compressions stocking covered by insurance, 20 -30 6/4/18   CHARU Iniguez.  Devices (COMMODE BEDSIDE) MISC Provide bedside commode covered by insurance 3/21/18   Gilda Riggs PA-C   aspirin 81 MG chewable tablet Take 81 mg by mouth daily 2/9/18   Historical Provider, MD   Ziegler Reason 229 MG/ML SOAJ  1/15/18   Historical Provider, MD   ARTIFICIAL TEARS 0.2-0.2-1 % SOLN  2/9/18   Historical Provider, MD   Ascorbic Acid (VITAMIN C) 1000 MG tablet Take 1,000 mg by mouth daily 2/9/18   Historical Provider, MD   latanoprost (XALATAN) 0.005 % ophthalmic solution Place 1 drop into the right eye daily  12/4/17   Historical Provider, MD   Misc. Devices (TRANSFER BENCH) MISC Use shower transfer bench with back,  every time patient gets in/or out of the shower. Please dispense insurance approved shower transfer bench. 8/15/17   Phyllis Corley MD   carvedilol (COREG) 6.25 MG tablet Take 25 mg by mouth 2 times daily (with meals)     Historical Provider, MD Brianda Jernigan LANCETS [address]O [address] [facility]  10/5/16   Historical Provider, MD   ONE TOUCH ULTRA TEST strip  1/24/16   Historical Provider, MD   Blood Pressure Monitoring (B-D ASSURE BPM/AUTO WRIST CUFF) MISC As directed. Labile hypertension.  1/25/16   Phyllis Corley MD   B-D ULTRAFINE III SHORT PEN 31G X 8 MM MISC  3/15/14   Historical Provider, MD       Future Appointments   Date Time Provider Jack Shah   1/17/2022 10:00 AM Tran Solo, APRN - [address] [facility]

## 2021-07-16 ENCOUNTER — CARE COORDINATION (OUTPATIENT)
Dept: CARE COORDINATION | Age: 61
End: 2021-07-16

## 2021-07-16 NOTE — CARE COORDINATION
Nutrition Care Coordinator Follow-Up visit:    Food Recall: eating 2-3 meals/d    Activity Level:  Sedentary: X  Lightly Active: Moderately Active:  Very Active:    Adult BMI:  Underweight (below 18.5)  Normal Weight (18.5-24.9)  Overweight (25-29. 9)  Obese (30-39. 9)  Morbidly Obese (>40) X    Weight Change: no change    Plan:  Plan was established with patient:  Increase dietary fiber by consuming whole grains, fruits and vegetables:X  Limit dietary cholesterol to >200mg/day: Increase water intake:  Avoid added sugar: X  Avoid sweetened beverages: X  Choose lean meats:      Monitoring: Will monitor weight:  Will monitor adherence to meal plan: Will monitor adherence to exercise plan: Will monitor HGA1c: X    Handouts Provided :  Low Carb snacking: X  Carb counting /individual meal plan:  Portion Control: x  Food Labels:  Physical Activity:  Low Fat/Cholesterol:  Hypo/Hyperglycemia: X  Calorie Controlled Meal Plan:    Goals: Increase water consumption to 8oz. 6-8 times daily:  Manage blood sugars by consuming 3 meals spaced every 4-5 hours with 2-3 snacks daily: reviewed  Increase fiber and decrease fat intake by consuming 1-2 fruit servings and 2-3 vegetable servings per day. Increase physical activity by:  Consume less than 2,000mg of sodium/day  Avoid consumption of sweetened beverages and added sugar by reading food labels:reviewed  Monitor blood sugars by using meter to check blood glucose before morning meal and 2 hours after a meal daily:BS- 100-120's, had one <70 recently- discussed prevention and treatment of hypoglcemia  Decrease risk of coronary heart disease by consuming fish that contains omega-3 fatty acids at least twice a week, avoiding partially hydrogenated oil/trans fats and limiting saturated fat intake by reading food labels:    Patient goals set:  1.   Reviewed what foods contain carbohydrate, what a serving size is of carbs and how to measure servings out to limit carbohydrates at meals and snacks.  Goal is 60gms of carb/meal, 15-30gms/snack. Low carb snacking reviewed. 2. Patient will eat 3 meals daily spaced every 4-5 hours. Reviewed importance of not going too long between meals, patient sometimes skips lunch and has had low blood sugars. Reviewed quick/easy lunch suggestions. Reviewed using Glucerna when going too long between meals, patient states she got coupons but has not purchased yet. 3. Reviewed using plate method at meals and working on increasing non-starchy vegetables and lean protein sources. Encouraged to increase lean meats, eggs, cottage cheese, nut/seeds.  Reviewed what non-starchy vegetables are and encouraged patient to make 1/2  plate non-starchy vegetables, 1/4 lean protein, 1/4 carbs at meals. Foods from each category reviewed along with serving size.    4. Reviewed avoiding sweets, discussed alternatives to sweets- sugar free pudding, jello, ect. Encouraged patient to keep sweets out of the house as much as possible and limit to once a week or less. Patient relays doing ok, sugars have been mostly in the low 100's, did have 1 <70- we reviewed prevention and treatment of hypoglycemia. We again discussed using Glucerna if patient is going too long between meals. Will follow up in 3-4 weeks to review and answer questions.      Zeus Yost

## 2021-07-27 ENCOUNTER — CARE COORDINATION (OUTPATIENT)
Dept: CARE COORDINATION | Age: 61
End: 2021-07-27

## 2021-07-27 NOTE — CARE COORDINATION
VM left requesting a return call. CM wanted to f/u on Arletha's elevated B/P. Plan:  F/U Friday if no return call.

## 2021-08-03 ENCOUNTER — CARE COORDINATION (OUTPATIENT)
Dept: CARE COORDINATION | Age: 61
End: 2021-08-03

## 2021-08-03 NOTE — CARE COORDINATION
left for Dre requesting a call to [phone]. CM would like to know the B/P reads and blood sugars. Don Blair has an appointment with Dr. Tariq Michelle on 8.5.21 to discuss her blood sugar. Plan:  F/U on B/P reads and appointment on 8.6. 21.

## 2021-08-09 ENCOUNTER — CARE COORDINATION (OUTPATIENT)
Dept: CARE COORDINATION | Age: 61
End: 2021-08-09

## 2021-08-09 DIAGNOSIS — K21.00 GASTROESOPHAGEAL REFLUX DISEASE WITH ESOPHAGITIS, UNSPECIFIED WHETHER HEMORRHAGE: ICD-10-CM

## 2021-08-09 DIAGNOSIS — Z76.0 MEDICATION REFILL: ICD-10-CM

## 2021-08-09 NOTE — CARE COORDINATION
Ambulatory Care Coordination Note  8/9/2021   Risk Score: 5  Charlson 10 Year Mortality Risk Score: 100%     ACC: Tg Headings    Summary Note: TC to Bishnu Sethi. Marcela Perez, Clinical Dietician reached out to  with a concern about Bishnu Sethi. She reported her blood sugars were ranging in the 300's to 50. Bishnu Sethi reports she has not been eating \"right\". She also eats all 3 of her meals late in the day. She slept until 11:30 today. She reports she had breakfast at 12:30 PM. At 3 this afternoon she reports she is getting ready to have tomato soup for lunch. She states she tries to eat her evening meal by 7:30 PM. She reported her fasting blood sugar this morning was 157. She states her B/P /90. She states when her adult sitter gets there she will call Dr. Pervis Cranker to confirm her next appointment. She states her brother thinks it is 8.30.21. CM asked if she could check into an appointment earlier than 8.30.21. Plan:  F/U on appointments, blood sugars and B/P tomorrow afternoon. Care Coordination Interventions    Program Enrollment: Complex Care  Referral from Primary Care Provider: No  Suggested Interventions and Community Resources  Diabetes Education: Completed  Zone Management Tools: Completed  Other Services or Interventions: An adult sitter while her brother is at work         Goals Addressed    None         Prior to Admission medications    Medication Sig Start Date End Date Taking?  Authorizing Provider   cloNIDine (CATAPRES) 0.3 MG tablet Take 1 tablet by mouth 3 times daily 7/12/21 8/11/21  Marien Homans, APRN - CNP   furosemide (LASIX) 80 MG tablet Take 1 tablet by mouth See Admin Instructions 80 mg in AM, 40 mg in PM 7/12/21   Marien Homans, APRN - CNP   amLODIPine (NORVASC) 5 MG tablet Take 1 tablet by mouth daily  Patient taking differently: Take 10 mg by mouth daily  6/30/21   Marien Homans, APRN - CNP   lisinopril (PRINIVIL;ZESTRIL) 40 MG tablet Take 40 mg by mouth daily    Historical Provider, MD   insulin regular human (HUMULIN R U-500 KWIKPEN) 500 UNIT/ML SOPN concentrated injection pen Inject 110 Units into the skin every morning (before breakfast)    Historical Provider, MD   insulin regular human (HUMULIN R U-500 KWIKPEN) 500 UNIT/ML SOPN concentrated injection pen Inject 25 Units into the skin daily (before lunch)    Historical Provider, MD   insulin regular human (HUMULIN R U-500 KWIKPEN) 500 UNIT/ML SOPN concentrated injection pen Inject 90 Units into the skin Daily with supper    Historical Provider, MD   Cholecalciferol (VITAMIN D3) 50 MCG (2000 UT) CAPS TAKE 1 CAPSULE BY MOUTH EVERY DAY 11/24/20   Historical Provider, MD   fenofibrate micronized (LOFIBRA) 67 MG capsule Take 1 capsule by mouth every morning (before breakfast) 2/10/21   Gwendolyn Turcios PA-C   atorvastatin (LIPITOR) 80 MG tablet Take 1 tablet by mouth daily 2/10/21   Gwendolyn Turcios PA-C   famotidine (PEPCID) 20 MG tablet Take 1 tablet by mouth daily 2/10/21   Gwenodlyn Turcios PA-C   acetaminophen (TYLENOL 8 HOUR) 650 MG extended release tablet Take 1 tablet by mouth 2 times daily as needed for Pain 2/10/21 7/12/21  Gwendolyn Turcios PA-C   Handicap Placard MISC by Does not apply route 9/16/19   Gwendolyn Turcios PA-C   Multiple Vitamins-Minerals [facility] SYSTEM ADVANCED 50 PLUS) TABS Take 1 tablet by mouth daily 1/14/19   Gwendolyn Turcios PA-C   docusate sodium (COLACE) 100 MG capsule Take 100 mg by mouth 2 times daily    Historical Provider, MD   omega-3 acid ethyl esters (LOVAZA) 1 g capsule Take 2 g by mouth 2 times daily    Historical Provider, MD   lidocaine (LIDODERM) 5 % Place 1 patch onto the skin daily 12 hours on, 12 hours off. Historical Provider, MD   Compression Stockings MISC Provide compressions stocking covered by insurance, 20 -30 6/4/18   CHARU Melendez.  Devices (COMMODE BEDSIDE) [facility] Provide bedside commode covered by insurance 3/21/18   Gwendolyn Turcios PA-C   aspirin 81

## 2021-08-09 NOTE — CARE COORDINATION
Nutrition Care Coordinator Follow-Up visit:    Food Recall: eating 2-3 meals/d    Activity Level:  Sedentary: X  Lightly Active: Moderately Active:  Very Active:    Adult BMI:  Underweight (below 18.5)  Normal Weight (18.5-24.9)  Overweight (25-29. 9)  Obese (30-39. 9)  Morbidly Obese (>40) X    Weight change: no change    Plan:  Plan was established with patient:  Increase dietary fiber by consuming whole grains, fruits and vegetables: X  Limit dietary cholesterol to >200mg/day: Increase water intake:  Avoid added sugar: X  Avoid sweetened beverages: X  Choose lean meats:      Monitoring: Will monitor weight:  Will monitor adherence to meal plan: Will monitor adherence to exercise plan: Will monitor HGA1c: x    Handouts Provided :  Low Carb snacking: X  Carb counting /individual meal plan:  Portion Control: X  Food Labels:  Physical Activity:  Low Fat/Cholesterol:  Hypo/Hyperglycemia: X- reviewed   Calorie Controlled Meal Plan:    Goals: Increase water consumption to 8oz. 6-8 times daily:  Manage blood sugars by consuming 3 meals spaced every 4-5 hours with 2-3 snacks daily: reviewed  Increase fiber and decrease fat intake by consuming 1-2 fruit servings and 2-3 vegetable servings per day.   Increase physical activity by: encouraged daily walking as tolerated  Consume less than 2,000mg of sodium/day  Avoid consumption of sweetened beverages and added sugar by reading food labels:reviewed  Monitor blood sugars by using meter to check blood glucose before morning meal and 2 hours after a meal daily:BS up and down recently  per patient- eating late in the day, sleeping in- encouraged to work on meal pattern  Decrease risk of coronary heart disease by consuming fish that contains omega-3 fatty acids at least twice a week, avoiding partially hydrogenated oil/trans fats and limiting saturated fat intake by reading food labels:reviewed    Patient goals set:  1.  Reviewed what a serving size is of carbs and how to measure servings out to limit carbohydrates at meals and snacks.  Goal is 60gms of carb/meal, 15-30gms/snack. Low carb snacking reviewed. 2. Patient will eat 3 meals daily spaced every 4-5 hours. Reviewed importance of not going too long between meals, patient sometimes skips lunch and has had low blood sugars. Reviewed quick/easy lunch suggestions. Reviewed using Glucerna- patient has not purchased yet discussed using when going too long between meals. 3. Reviewed working on increasing non-starchy vegetables and lean protein sources. Encouraged to increase lean meats, eggs, cottage cheese, nut/seeds.  Reviewed what non-starchy vegetables are and encouraged patient to make 1/2  plate non-starchy vegetables, 1/4 lean protein, 1/4 carbs at meals. Foods from each category reviewed along with serving size.    4. Reviewed avoiding sweets, discussed alternatives to sweets- sugar free pudding, jello, ect. Encouraged patient to keep sweets out of the house as much as possible and limit to once a week or less.   Patient admits sugars up and down- we discussed working on meal pattern and sticking with it- not going too long between meals. Reviewed hypoglycemia prevention and treatment. Reviewed using Glucerna if patient is going too long between meals. Will follow up in 3-4 weeks to review and answer questions. Spoke with DARION Tavares to make her aware of  patient's recent blood sugars being up and down. DARION will contact patient to discuss.   Nancy Tolliver

## 2021-08-10 RX ORDER — FAMOTIDINE 20 MG/1
20 TABLET, FILM COATED ORAL DAILY
Qty: 90 TABLET | Refills: 1 | Status: SHIPPED | OUTPATIENT
Start: 2021-08-10 | End: 2021-12-02

## 2021-08-12 ENCOUNTER — CARE COORDINATION (OUTPATIENT)
Dept: CARE COORDINATION | Age: 61
End: 2021-08-12

## 2021-08-12 NOTE — CARE COORDINATION
Ambulatory Care Coordination Note  8/12/2021  CM Risk Score: 5  Charlson 10 Year Mortality Risk Score: 100%     ACC: Vazquez Rivera    Summary Note: James Morales reports her appointment with Dr. Marcus Davis is 8.30.21 at 11:20 AM.   Her blood sugars have been fluctuating. She reports her fasting yesterday morning was 40. She reports she drank some sugar water. She did not check her blood sugar after 15 minutes like instructed. She reports her blood sugar before dinner was 232 with a HS blood sugar of 313. Her fasting blood sugar this morning was 235. She \"misnunderstood\" CM concerning her HS snack. She thought she was not to have one. CM reviewed again today that she can have 1/2 a sandwich for a bedtime snack. She has been eating lunch meat that is high in sodium. She reports she will start eating roasted chicken or turkey lunch meat. Plan:  F/U on blood sugars and B/P in 1 week. Care Coordination Interventions    Program Enrollment: Complex Care  Referral from Primary Care Provider: No  Suggested Interventions and Community Resources  Diabetes Education: Completed  Zone Management Tools: Completed  Other Services or Interventions: An adult sitter while her brother is at work         Goals Addressed    None         Prior to Admission medications    Medication Sig Start Date End Date Taking?  Authorizing Provider   famotidine (PEPCID) 20 MG tablet Take 1 tablet by mouth daily 8/10/21   KLARISSA Peña CNP   cloNIDine (CATAPRES) 0.3 MG tablet Take 1 tablet by mouth 3 times daily 7/12/21 8/11/21  KLARISSA Peña CNP   furosemide (LASIX) 80 MG tablet Take 1 tablet by mouth See Admin Instructions 80 mg in AM, 40 mg in PM 7/12/21   KLARISSA Peña CNP   amLODIPine (NORVASC) 5 MG tablet Take 1 tablet by mouth daily  Patient taking differently: Take 10 mg by mouth daily  6/30/21   KLARISSA Peña CNP   lisinopril (PRINIVIL;ZESTRIL) 40 MG tablet Take 40 mg by mouth daily    Historical Provider, MD insulin regular human (HUMULIN R U-500 KWIKPEN) 500 UNIT/ML SOPN concentrated injection pen Inject 110 Units into the skin every morning (before breakfast)    Historical Provider, MD   insulin regular human (HUMULIN R U-500 KWIKPEN) 500 UNIT/ML SOPN concentrated injection pen Inject 25 Units into the skin daily (before lunch)    Historical Provider, MD   insulin regular human (HUMULIN R U-500 KWIKPEN) 500 UNIT/ML SOPN concentrated injection pen Inject 90 Units into the skin Daily with supper    Historical Provider, MD   Cholecalciferol (VITAMIN D3) 50 MCG (2000 UT) CAPS TAKE 1 CAPSULE BY MOUTH EVERY DAY 11/24/20   Historical Provider, MD   fenofibrate micronized (LOFIBRA) 67 MG capsule Take 1 capsule by mouth every morning (before breakfast) 2/10/21   Desiree Black PA-C   atorvastatin (LIPITOR) 80 MG tablet Take 1 tablet by mouth daily 2/10/21   Desiree Black PA-C   acetaminophen (TYLENOL 8 HOUR) 650 MG extended release tablet Take 1 tablet by mouth 2 times daily as needed for Pain 2/10/21 7/12/21  Desiree Black PA-C   Handicap Placard MISC by Does not apply route 9/16/19   Desiree Black PA-C   Multiple Vitamins-Minerals [facility] SYSTEM ADVANCED 50 PLUS) TABS Take 1 tablet by mouth daily 1/14/19   Desiree Black PA-C   docusate sodium (COLACE) 100 MG capsule Take 100 mg by mouth 2 times daily    Historical Provider, MD   omega-3 acid ethyl esters (LOVAZA) 1 g capsule Take 2 g by mouth 2 times daily    Historical Provider, MD   lidocaine (LIDODERM) 5 % Place 1 patch onto the skin daily 12 hours on, 12 hours off. Historical Provider, MD   Compression Stockings MISC Provide compressions stocking covered by insurance, 20 -30 6/4/18   CHARU Aviles.  Devices (COMMODE BEDSIDE) [facility] Provide bedside commode covered by insurance 3/21/18   Desiree Black PA-C   aspirin 81 MG chewable tablet Take 81 mg by mouth daily 2/9/18   Historical Provider, MD [place] 525 MG/ML SOAJ  1/15/18   Historical Provider, MD   ARTIFICIAL TEARS 0.2-0.2-1 % SOLN  2/9/18   Historical Provider, MD   Ascorbic Acid (VITAMIN C) 1000 MG tablet Take 1,000 mg by mouth daily 2/9/18   Historical Provider, MD   latanoprost (XALATAN) 0.005 % ophthalmic solution Place 1 drop into the right eye daily  12/4/17   Historical Provider, MD   Misc. Devices (TRANSFER BENCH) MISC Use shower transfer bench with back,  every time patient gets in/or out of the shower. Please dispense insurance approved shower transfer bench. 8/15/17   Kenny Chacon MD   carvedilol (COREG) 6.25 MG tablet Take 25 mg by mouth 2 times daily (with meals)     Historical Provider, MD [facility] [address] [facility]  10/5/16   Historical Provider, MD   ONE TOUCH ULTRA TEST strip  1/24/16   Historical Provider, MD   Blood Pressure Monitoring (B-D ASSURE BPM/AUTO WRIST CUFF) MISC As directed. Labile hypertension.  1/25/16   Kenny Chacon MD   B-D ULTRAFINE III SHORT PEN 31G X 8 MM MISC  3/15/14   Historical Provider, MD       Future Appointments   Date Time Provider Jack Alyssa   1/17/2022 10:00 AM Rosalinda Vizcarra APRN - [address] [facility]

## 2021-08-23 DIAGNOSIS — Z76.0 MEDICATION REFILL: ICD-10-CM

## 2021-08-23 DIAGNOSIS — Z79.4 TYPE 2 DIABETES MELLITUS WITH DIABETIC POLYNEUROPATHY, WITH LONG-TERM CURRENT USE OF INSULIN (HCC): ICD-10-CM

## 2021-08-23 DIAGNOSIS — E11.42 TYPE 2 DIABETES MELLITUS WITH DIABETIC POLYNEUROPATHY, WITH LONG-TERM CURRENT USE OF INSULIN (HCC): ICD-10-CM

## 2021-08-23 RX ORDER — FENOFIBRATE 67 MG/1
67 CAPSULE ORAL
Qty: 90 CAPSULE | Refills: 1 | Status: SHIPPED | OUTPATIENT
Start: 2021-08-23 | End: 2022-01-03

## 2021-08-27 ENCOUNTER — CARE COORDINATION (OUTPATIENT)
Dept: CARE COORDINATION | Age: 61
End: 2021-08-27

## 2021-08-27 NOTE — CARE COORDINATION
Nutrition Care Coordinator Follow-Up visit:    Food Recall: eating 2 meals/d    Activity Level:  Sedentary: X  Lightly Active: Moderately Active:  Very Active:    Adult BMI:  Underweight (below 18.5)  Normal Weight (18.5-24.9)  Overweight (25-29. 9)  Obese (30-39. 9)  Morbidly Obese (>40) X    Weight Change: no change    Plan:  Plan was established with patient:  Increase dietary fiber by consuming whole grains, fruits and vegetables: X  Limit dietary cholesterol to >200mg/day: Increase water intake:  Avoid added sugar: X  Avoid sweetened beverages: X  Choose lean meats:      Monitoring: Will monitor weight:  Will monitor adherence to meal plan: Will monitor adherence to exercise plan: Will monitor HGA1c: X    Handouts Provided :  Low Carb snacking: X  Carb counting /individual meal plan:  Portion Control: X  Food Labels:  Physical Activity:  Low Fat/Cholesterol:  Hypo/Hyperglycemia:  Calorie Controlled Meal Plan:    Goals: Increase water consumption to 8oz. 6-8 times daily:  Manage blood sugars by consuming 3 meals spaced every 4-5 hours with 2-3 snacks daily: reviewed  Increase fiber and decrease fat intake by consuming 1-2 fruit servings and 2-3 vegetable servings per day.   Increase physical activity by: encouraged daily walking as tolerated  Consume less than 2,000mg of sodium/day  Avoid consumption of sweetened beverages and added sugar by reading food labels:reviewed  Monitor blood sugars by using meter to check blood glucose before morning meal and 2 hours after a meal daily:unable to give me readings, patient not home  Decrease risk of coronary heart disease by consuming fish that contains omega-3 fatty acids at least twice a week, avoiding partially hydrogenated oil/trans fats and limiting saturated fat intake by reading food labels:    Patient goals set:  1. Reviewed what a serving size is of carbs and how to measure servings out to limit carbohydrates at meals and snacks.  Goal is 60gms of carb/meal, 15-30gms/snack. Low carb snacking reviewed. 2. Patient will eat 3 meals daily spaced every 4-5 hours. Reviewed importance of not going too long between meals, patient sometimes skips lunch. Reviewed quick/easy lunch suggestions. Reviewed using Glucerna- when going too long between meals. 3. Reviewed working on increasing non-starchy vegetables and lean protein sources. Encouraged to increase lean meats, eggs, cottage cheese, nut/seeds.  Reviewed what non-starchy vegetables are and encouraged patient to make 1/2  plate non-starchy vegetables, 1/4 lean protein, 1/4 carbs at meals. Foods from each category reviewed along with serving size.    4. Reviewed avoiding sweets, discussed alternatives to sweets- sugar free pudding, jello, ect. Encouraged patient to keep sweets out of the house as much as possible and limit to once a week or less.   Patient admits sugars continue to be up and down- reviewed working on meal pattern and sticking with it- not going too long between meals. Reviewed hypoglycemia prevention and treatment. Reviewed using Glucerna if patient is going too long between meals, patient states she does have the coupons I sent her to buy it. Will follow up in 3-4 weeks to review and answer questions.         Roberth Calix

## 2021-09-21 ENCOUNTER — CARE COORDINATION (OUTPATIENT)
Dept: CARE COORDINATION | Age: 61
End: 2021-09-21

## 2021-09-21 NOTE — CARE COORDINATION
Nutrition Care Coordinator Follow-Up visit:    Food Recall: eating 2-3 meals/d    Activity Level:  Sedentary: X  Lightly Active: Moderately Active:  Very Active:    Adult BMI:  Underweight (below 18.5)  Normal Weight (18.5-24.9)  Overweight (25-29. 9)  Obese (30-39. 9)  Morbidly Obese (>40) X    Weight Change: no change    Plan:  Plan was established with patient:  Increase dietary fiber by consuming whole grains, fruits and vegetables: X  Limit dietary cholesterol to >200mg/day: Increase water intake:  Avoid added sugar: X  Avoid sweetened beverages: X  Choose lean meats: X      Monitoring: Will monitor weight:  Will monitor adherence to meal plan: Will monitor adherence to exercise plan: Will monitor HGA1c: X    Handouts Provided :  Low Carb snacking:X  Carb counting /individual meal plan:  Portion Control:  Food Labels:  Physical Activity:  Low Fat/Cholesterol:  Hypo/Hyperglycemia:  Calorie Controlled Meal Plan:    Goals: Increase water consumption to 8oz. 6-8 times daily:  Manage blood sugars by consuming 3 meals spaced every 4-5 hours with 2-3 snacks daily: reviewed  Increase fiber and decrease fat intake by consuming 1-2 fruit servings and 2-3 vegetable servings per day. Increase physical activity by:  Consume less than 2,000mg of sodium/day  Avoid consumption of sweetened beverages and added sugar by reading food labels: reviewed  Monitor blood sugars by using meter to check blood glucose before morning meal and 2 hours after a meal daily:-300 per patient  Decrease risk of coronary heart disease by consuming fish that contains omega-3 fatty acids at least twice a week, avoiding partially hydrogenated oil/trans fats and limiting saturated fat intake by reading food labels:reviewed    Patient goals set:  1. Reviewed what a serving size is of carbs and how to measure servings out to limit carbohydrates at meals and snacks.  Goal is 60gms of carb/meal, 15-30gms/snack.  Low carb snacking and having a snack before bed time reviewed. 2. Patient will eat 3 meals daily spaced every 4-5 hours. Reviewed quick/easy lunch/ breakfast suggestions. Reviewed using Glucerna- when going too long between meals of skipping meals. 3. Reviewed working on increasing non-starchy vegetables and lean protein sources. Encouraged to increase lean meats, eggs, cottage cheese, nut/seeds.  Reviewed what non-starchy vegetables are and encouraged patient to make 1/2  plate non-starchy vegetables, 1/4 lean protein, 1/4 carbs at meals. Foods from each category reviewed along with serving size.    4. Reviewed avoiding sweets, discussed alternatives to sweets- sugar free pudding, jello, ect. Encouraged patient to keep sweets out of the house as much as possible and limit to once a week or less.   Patient relays sugars still up and down. We reviewed working on meal pattern and sticking with it- not going too long between meals. Reviewed hypoglycemia prevention and treatment. Reviewed using Glucerna if patient is going too long between meals. Will follow up in 3-4 weeks to review and answer questions.    Mailed Glucerna coupons to patient's home  Tere Mary

## 2021-10-06 ENCOUNTER — CARE COORDINATION (OUTPATIENT)
Dept: CARE COORDINATION | Age: 61
End: 2021-10-06

## 2021-10-11 ENCOUNTER — CARE COORDINATION (OUTPATIENT)
Dept: CARE COORDINATION | Age: 61
End: 2021-10-11

## 2021-10-11 NOTE — CARE COORDINATION
Nutrition Care Coordinator Follow-Up visit:    Food Recall: eaitng 2-3 meals/d    Activity Level:  Sedentary: X  Lightly Active: Moderately Active:  Very Active:    Adult BMI:  Underweight (below 18.5)  Normal Weight (18.5-24.9)  Overweight (25-29. 9)  Obese (30-39. 9)  Morbidly Obese (>40) X    Weight Change: no change    Plan:  Plan was established with patient:  Increase dietary fiber by consuming whole grains, fruits and vegetables:  Limit dietary cholesterol to >200mg/day: Increase water intake:  Avoid added sugar:  Avoid sweetened beverages:  Choose lean meats:      Monitoring: Will monitor weight:  Will monitor adherence to meal plan: Will monitor adherence to exercise plan: Will monitor HGA1c: X    Handouts Provided :  Low Carb snacking: X  Carb counting /individual meal plan:  Portion Control: X  Food Labels:  Physical Activity:  Low Fat/Cholesterol:  Hypo/Hyperglycemia:  Calorie Controlled Meal Plan:    Goals: Increase water consumption to 8oz. 6-8 times daily:  Manage blood sugars by consuming 3 meals spaced every 4-5 hours with 2-3 snacks daily: reviewed  Increase fiber and decrease fat intake by consuming 1-2 fruit servings and 2-3 vegetable servings per day. Increase physical activity by: encouraged daily walking as tolerated  Consume less than 2,000mg of sodium/day  Avoid consumption of sweetened beverages and added sugar by reading food labels:reviewed  Monitor blood sugars by using meter to check blood glucose before morning meal and 2 hours after a meal daily:BS up and down per patient- could not give me readings not at home  Decrease risk of coronary heart disease by consuming fish that contains omega-3 fatty acids at least twice a week, avoiding partially hydrogenated oil/trans fats and limiting saturated fat intake by reading food labels:reviewed    Patient goals set:  1.   Reviewed what a serving size is of carbs and how to measure servings out to limit carbohydrates at meals and snacks.  Goal is 60gms of carb/meal, 15-30gms/snack. Low carb snacking and having a snack before bed time reviewed. 2. Patient will eat 3 meals daily spaced every 4-5 hours. Reviewed quick/easy lunch/ breakfast suggestions. Reviewed using Glucerna- when going too long between meals of skipping meals. 3. Reviewed working on increasing non-starchy vegetables and lean protein sources. Encouraged to increase lean meats, eggs, cottage cheese, nut/seeds.  Reviewed what non-starchy vegetables are and encouraged patient to make 1/2  plate non-starchy vegetables, 1/4 lean protein, 1/4 carbs at meals. Foods from each category reviewed along with serving size.    4. Reviewed avoiding sweets, discussed alternatives to sweets- sugar free pudding, jello, ect. Encouraged patient to keep sweets out of the house as much as possible and limit to once a week or less.   Patient relays sugars still up and down, did have 1 low reading recently but happening less per patient. Reviewed hypoglycemia prevention and treatment. Reviewed using Glucerna if patient is going too long between meals, patient states planning to buy today. Will follow up in 3-4 weeks to review and answer questions.      Mailed Glucerna coupons to patient's home.   Roseann Horan

## 2021-10-12 DIAGNOSIS — Z76.0 MEDICATION REFILL: ICD-10-CM

## 2021-10-12 DIAGNOSIS — Z79.4 TYPE 2 DIABETES MELLITUS WITH DIABETIC POLYNEUROPATHY, WITH LONG-TERM CURRENT USE OF INSULIN (HCC): ICD-10-CM

## 2021-10-12 DIAGNOSIS — E11.42 TYPE 2 DIABETES MELLITUS WITH DIABETIC POLYNEUROPATHY, WITH LONG-TERM CURRENT USE OF INSULIN (HCC): ICD-10-CM

## 2021-10-12 RX ORDER — ATORVASTATIN CALCIUM 80 MG/1
80 TABLET, FILM COATED ORAL DAILY
Qty: 90 TABLET | Refills: 1 | Status: SHIPPED | OUTPATIENT
Start: 2021-10-12 | End: 2022-08-10 | Stop reason: SDUPTHER

## 2021-10-12 NOTE — TELEPHONE ENCOUNTER
Health Maintenance   Topic Date Due    COVID-19 Vaccine (1) Never done    DTaP/Tdap/Td vaccine (1 - Tdap) Never done    Shingles Vaccine (1 of 2) Never done    Diabetic foot exam  03/29/2017    Annual Wellness Visit (AWV)  Never done    Lipid screen  05/20/2020    Breast cancer screen  06/24/2021    Potassium monitoring  07/13/2021    Creatinine monitoring  07/13/2021    Flu vaccine (1) 09/01/2021    A1C test (Diabetic or Prediabetic)  10/12/2021    Diabetic retinal exam  01/20/2022    Pneumococcal 0-64 years Vaccine (2 of 2 - PPSV23) 09/16/2025    Colon cancer screen colonoscopy  06/25/2028    Hepatitis C screen  Completed    HIV screen  Completed    Hepatitis A vaccine  Aged Out    Hib vaccine  Aged Out    Meningococcal (ACWY) vaccine  Aged Out             (applicable per patient's age: Cancer Screenings, Depression Screening, Fall Risk Screening, Immunizations)    Hemoglobin A1C (%)   Date Value   07/12/2021 10.3   02/10/2021 11.0   03/25/2019 9.6     Microalb/Crt.  Ratio (mcg/mg creat)   Date Value   04/04/2015 3,460     LDL Cholesterol (mg/dL)   Date Value   04/04/2015          AST (U/L)   Date Value   04/04/2015 19     ALT (U/L)   Date Value   04/04/2015 24     BUN (mg/dL)   Date Value   04/04/2015 14      (goal A1C is < 7)   (goal LDL is <100) need 30-50% reduction from baseline     BP Readings from Last 3 Encounters:   07/12/21 (!) 140/66   02/10/21 128/65   02/17/20 109/61    (goal /80)      All Future Testing planned in [facility]:  Lab Frequency Next Occurrence   XR HIP RIGHT (2-3 VIEWS) Once 05/25/2021   XR LUMBAR SPINE (2-3 VIEWS) Once 05/25/2021   EDITH Digital Screen Bilateral [[id number]] Once 11/26/2021   Lipid, Fasting Once 11/19/2021   US PELVIS COMPLETE Once 10/27/2021       Next Visit Date:  Future Appointments   Date Time Provider Jack Shah   1/17/2022 10:00 AM Brittny Hidalgo, APRN - CNP [facility]            Patient Active Problem List:     Malignant neoplasm of uterus (HCC)     Acid reflux     Heart murmur     Class 3 severe obesity due to excess calories with serious comorbidity and body mass index (BMI) of 40.0 to 44.9 in adult [facility])     Type 2 diabetes mellitus treated with insulin (HCC)     Microalbuminuria     Noncompliance with treatment     Hypokalemia     Lymphedema of leg     Primary osteoarthritis of both knees     Bad memory     Unsteady gait     Calculus of gallbladder     Weakness of left side of body     AF (amaurosis fugax)     Chronic kidney disease     Abnormal liver enzymes     Ataxic aphasia     Herpes simplex type 2 infection     Temporary cerebral vascular dysfunction     Moderate or severe vision impairment, both eyes     Familial hyperlipidemia     Essential hypertension     Occipital cerebral infarction [facility])     Drug overdose     Hypersomnolence     Ischemic stroke (HCC)     Right hemiparesis ([address]

## 2021-10-13 ENCOUNTER — TELEPHONE (OUTPATIENT)
Dept: PHARMACY | Facility: CLINIC | Age: 61
End: 2021-10-13

## 2021-10-13 NOTE — TELEPHONE ENCOUNTER
[facility] [place] HEALTH CLINICAL PHARMACY REVIEW: ADHERENCE REVIEW  Identified care gap per [place]; fills at [facility]: ACE/ARB adherence    Last Visit: unable to determine in this EMR; prescriber: Mabelene Gowers - external nephrology  - 7/12/21 with PCP    Patient identified as LIS = 1, therefore patient may be able to receive a 90-day supply for the same cost as a 30-day supply    Patient also appears to be prescribed: statin, DM     Patient not found in Outcomes MTM    ASSESSMENT  ACE/ARB ADHERENCE    Per Insurance Records through 10/11/21 (ONEL [place] = 76%; Potential Fail Date: 10/17/21):   Lisinopril 40mg daily last filled on 5/16/21 for 90 day supply. Next refill due: 8/16/21    Per Reconciled Dispense Report: timely refill from Feb to May; 2 refills remain    BP Readings from Last 3 Encounters:   07/12/21 (!) 140/66   02/10/21 128/65   02/17/20 109/61     CrCl cannot be calculated (Patient's most recent lab result is older than the maximum 120 days allowed. ). DIABETES ADHERENCE    Per Insurance Records through 10/11/21: not reported (insulin)    Lab Results   Component Value Date    LABA1C 10.3 07/12/2021    LABA1C 11.0 02/10/2021    LABA1C 9.6 03/25/2019   *f/b external endocrinology    STATIN ADHERENCE    Per Insurance Records through 10/11/21 (ONEL [place] = 91%; Potential Fail Date: passed 2021): Atorvastatin 80mg  daily last filled on 9/28/21 for 90 day supply. Next refill due: 12/17/21    Per chart, just reordered 10/12/21 for #90, 1 refill    Lab Results   Component Value Date    CHOL 291 (H) 04/04/2015    TRIG 502 (H) 04/04/2015    HDL 42 04/04/2015    LDLCHOLESTEROL      04/04/2015    LDLDIRECT 182 (H) 04/04/2015     ALT   Date Value Ref Range Status   04/04/2015 24 5 - 33 U/L Final     AST   Date Value Ref Range Status   04/04/2015 19 <32 U/L Final     The ASCVD Risk score (Raiza Mares, et al., 2013) failed to calculate for the following reasons:     The patient has a prior MI or stroke diagnosis     PLAN  The following are interventions that have been identified:   - Patient overdue refilling lisinopril - rx'd by external nephrology, still taking?   - Did she recently  atorvastatin refill? (looks like refilled @9/28)    Attempting to reach patient to review.  Left message asking for return call. Will also send Mychart message.       Future Appointments   Date Time Provider Jack Shah   1/17/2022 10:00 AM Hank Frausto, APRN - CNP [facility] IN [facility], PharmD, [facility]  Department, toll free: [phone], option 1

## 2021-10-15 NOTE — TELEPHONE ENCOUNTER
Attempting again to reach patient.  Left another message; as below, Mychart message sent (not yet read by patient).    =======================================================   For Pharmacy Admin Tracking Only     Gap Closed?: No    Time Spent (min): 10

## 2021-10-20 ENCOUNTER — CARE COORDINATION (OUTPATIENT)
Dept: CARE COORDINATION | Age: 61
End: 2021-10-20

## 2021-11-03 ENCOUNTER — CARE COORDINATION (OUTPATIENT)
Dept: CARE COORDINATION | Age: 61
End: 2021-11-03

## 2021-11-03 NOTE — CARE COORDINATION
Patient states she is unable to talk now, on her way out. Will attempt to reach patient again within 1-2 weeks.

## 2021-11-15 ENCOUNTER — CARE COORDINATION (OUTPATIENT)
Dept: CARE COORDINATION | Age: 61
End: 2021-11-15

## 2021-11-16 ENCOUNTER — CARE COORDINATION (OUTPATIENT)
Dept: CARE COORDINATION | Age: 61
End: 2021-11-16

## 2021-11-17 NOTE — CARE COORDINATION
Nutrition Care Coordinator Follow-Up visit:    Food Recall: eating 2-3 meals/d    Activity Level:  Sedentary: X  Lightly Active: Moderately Active:  Very Active:    Adult BMI:  Underweight (below 18.5)  Normal Weight (18.5-24.9)  Overweight (25-29. 9)  Obese (30-39. 9)  Morbidly Obese (>40) X    Weight Change: no change    Plan:  Plan was established with patient:  Increase dietary fiber by consuming whole grains, fruits and vegetables: X  Limit dietary cholesterol to >200mg/day: Increase water intake:  Avoid added sugar: X  Avoid sweetened beverages: X  Choose lean meats: X      Monitoring: Will monitor weight:  Will monitor adherence to meal plan: Will monitor adherence to exercise plan: Will monitor HGA1c: X    Handouts Provided :  Low Carb snacking: X  Carb counting /individual meal plan:  Portion Control: X  Food Labels:  Physical Activity:  Low Fat/Cholesterol:  Hypo/Hyperglycemia:  Calorie Controlled Meal Plan:    Goals: Increase water consumption to 8oz. 6-8 times daily:  Manage blood sugars by consuming 3 meals spaced every 4-5 hours with 2-3 snacks daily: reviewed  Increase fiber and decrease fat intake by consuming 1-2 fruit servings and 2-3 vegetable servings per day. Increase physical activity by:  Consume less than 2,000mg of sodium/day  Avoid consumption of sweetened beverages and added sugar by reading food labels:reviewed  Monitor blood sugars by using meter to check blood glucose before morning meal and 2 hours after a meal daily:BS- 200-300's per patient  Decrease risk of coronary heart disease by consuming fish that contains omega-3 fatty acids at least twice a week, avoiding partially hydrogenated oil/trans fats and limiting saturated fat intake by reading food labels:reviewed    Patient goals set:  1. Reviewed what a serving size is of carbs and how to measure servings out to limit carbohydrates at meals and snacks.  Goal is 60gms of carb/meal, 15-30gms/snack.  Low carb snacking and having a snack before bed time reviewed. 2. Patient will eat 3 meals daily spaced every 4-5 hours. Reviewed quick/easy lunch/ breakfast suggestions. Reviewed using Glucerna- when going too long between meals of skipping meals. 3. Reviewed working on increasing non-starchy vegetables and lean protein sources. Encouraged to increase lean meats, eggs, cottage cheese, nut/seeds.  Reviewed what non-starchy vegetables are and encouraged patient to make 1/2  plate non-starchy vegetables, 1/4 lean protein, 1/4 carbs at meals. Foods from each category reviewed along with serving size.    4. Reviewed avoiding sweets, patient admits to eating jelly and ice cream often recently.  Discussed alternatives to sweets- sugar free pudding, jello,  No added sugar ice cream and limiting amount to 1/2 cup. Encouraged patient to keep sweets out of the house as much as possible and limit to once a week or less.   Patient relays sugars have been elevates- 200-300's, no low blood sugars recently. Reviewed portion control and avoiding sweets- keeping them out of the house. Reviewed using Glucerna if patient is going too long between meals, patient states planning to buy today. Will follow up in 3-4 weeks to review and answer questions.      Mailed Glucerna coupons to patient's home address.   Emani Davis

## 2021-12-01 DIAGNOSIS — K21.00 GASTROESOPHAGEAL REFLUX DISEASE WITH ESOPHAGITIS, UNSPECIFIED WHETHER HEMORRHAGE: ICD-10-CM

## 2021-12-01 DIAGNOSIS — Z76.0 MEDICATION REFILL: ICD-10-CM

## 2021-12-01 NOTE — TELEPHONE ENCOUNTER
Health Maintenance   Topic Date Due    COVID-19 Vaccine (1) Never done    DTaP/Tdap/Td vaccine (1 - Tdap) Never done    Shingles Vaccine (1 of 2) Never done    Diabetic foot exam  03/29/2017    Annual Wellness Visit (AWV)  Never done    Lipid screen  05/20/2020    Breast cancer screen  06/24/2021    Potassium monitoring  07/13/2021    Creatinine monitoring  07/13/2021    Flu vaccine (1) 09/01/2021    A1C test (Diabetic or Prediabetic)  10/12/2021    Diabetic retinal exam  01/20/2022    Pneumococcal 0-64 years Vaccine (2 of 2 - PPSV23) 09/16/2025    Colon cancer screen colonoscopy  06/25/2028    Hepatitis C screen  Completed    HIV screen  Completed    Hepatitis A vaccine  Aged Out    Hib vaccine  Aged Out    Meningococcal (ACWY) vaccine  Aged Out             (applicable per patient's age: Cancer Screenings, Depression Screening, Fall Risk Screening, Immunizations)    Hemoglobin A1C (%)   Date Value   07/12/2021 10.3   02/10/2021 11.0   03/25/2019 9.6     Microalb/Crt.  Ratio (mcg/mg creat)   Date Value   04/04/2015 3,460     LDL Cholesterol (mg/dL)   Date Value   04/04/2015          AST (U/L)   Date Value   04/04/2015 19     ALT (U/L)   Date Value   04/04/2015 24     BUN (mg/dL)   Date Value   04/04/2015 14      (goal A1C is < 7)   (goal LDL is <100) need 30-50% reduction from baseline     BP Readings from Last 3 Encounters:   07/12/21 (!) 140/66   02/10/21 128/65   02/17/20 109/61    (goal /80)      All Future Testing planned in [facility]:  Lab Frequency Next Occurrence   XR HIP RIGHT (2-3 VIEWS) Once 05/25/2021   XR LUMBAR SPINE (2-3 VIEWS) Once 05/25/2021   EDITH Digital Screen Bilateral [[id number]] Once 11/26/2021   Lipid, Fasting Once 11/19/2021   US PELVIS COMPLETE Once 10/27/2021       Next Visit Date:  Future Appointments   Date Time Provider Jack Shah   1/17/2022 10:00 AM Caryn Hernandez APRN - CNP [facility] IN [facility]            Patient Active Problem List:     Malignant neoplasm of uterus (HCC)     Acid reflux     Heart murmur     Class 3 severe obesity due to excess calories with serious comorbidity and body mass index (BMI) of 40.0 to 44.9 in adult [facility])     Type 2 diabetes mellitus treated with insulin (HCC)     Microalbuminuria     Noncompliance with treatment     Hypokalemia     Lymphedema of leg     Primary osteoarthritis of both knees     Bad memory     Unsteady gait     Calculus of gallbladder     Weakness of left side of body     AF (amaurosis fugax)     Chronic kidney disease     Abnormal liver enzymes     Ataxic aphasia     Herpes simplex type 2 infection     Temporary cerebral vascular dysfunction     Moderate or severe vision impairment, both eyes     Familial hyperlipidemia     Essential hypertension     Occipital cerebral infarction [facility])     Drug overdose     Hypersomnolence     Ischemic stroke (HCC)     Right hemiparesis ([address]

## 2021-12-02 RX ORDER — FAMOTIDINE 20 MG/1
TABLET, FILM COATED ORAL
Qty: 90 TABLET | Refills: 1 | Status: SHIPPED | OUTPATIENT
Start: 2021-12-02 | End: 2022-07-18

## 2021-12-10 ENCOUNTER — CARE COORDINATION (OUTPATIENT)
Dept: CARE COORDINATION | Age: 61
End: 2021-12-10

## 2021-12-10 RX ORDER — NIFEDIPINE 90 MG/1
90 TABLET, FILM COATED, EXTENDED RELEASE ORAL DAILY
COMMUNITY
End: 2022-06-16 | Stop reason: ALTCHOICE

## 2021-12-10 NOTE — CARE COORDINATION
Nutrition Care Coordinator Follow-Up visit:    Food Recall: eating 2-3 meals/d    Activity Level:  Sedentary: X  Lightly Active: Moderately Active:  Very Active:    Adult BMI:  Underweight (below 18.5)  Normal Weight (18.5-24.9)  Overweight (25-29. 9)  Obese (30-39. 9)  Morbidly Obese (>40) X    Weight Change: no change    Plan:  Plan was established with patient:  Increase dietary fiber by consuming whole grains, fruits and vegetables: X  Limit dietary cholesterol to >200mg/day: Increase water intake:  Avoid added sugar: X  Avoid sweetened beverages: X  Choose lean meats:      Monitoring: Will monitor weight:  Will monitor adherence to meal plan: Will monitor adherence to exercise plan: Will monitor HGA1c: X    Handouts Provided :  Low Carb snacking: X  Carb counting /individual meal plan:  Portion Control: X  Food Labels:  Physical Activity:  Low Fat/Cholesterol:  Hypo/Hyperglycemia:  Calorie Controlled Meal Plan:    Goals: Increase water consumption to 8oz. 6-8 times daily:  Manage blood sugars by consuming 3 meals spaced every 4-5 hours with 2-3 snacks daily:reviewed  Increase fiber and decrease fat intake by consuming 1-2 fruit servings and 2-3 vegetable servings per day. Increase physical activity by:  Consume less than 2,000mg of sodium/day  Avoid consumption of sweetened beverages and added sugar by reading food labels:reviewed  Monitor blood sugars by using meter to check blood glucose before morning meal and 2 hours after a meal daily:patient not at home could not give me readings  Decrease risk of coronary heart disease by consuming fish that contains omega-3 fatty acids at least twice a week, avoiding partially hydrogenated oil/trans fats and limiting saturated fat intake by reading food labels:reviewed    Patient goals set:  1. Reviewed what a serving size is of carbs and how to measure servings out to limit carbohydrates at meals and snacks.  Goal is 60gms of carb/meal, 15-30gms/snack.  Low carb snacking and having a snack before bed time reviewed. 2. Patient will eat 3 meals daily spaced every 4-5 hours. Reviewed quick/easy lunch/ breakfast suggestions. Reviewed using Glucerna- when going too long between meals of skipping meals. 3. Reviewed working on increasing non-starchy vegetables and lean protein sources. Encouraged to increase lean meats, eggs, cottage cheese, nut/seeds.  Reviewed what non-starchy vegetables are and encouraged patient to make 1/2  plate non-starchy vegetables, 1/4 lean protein, 1/4 carbs at meals. Foods from each category reviewed along with serving size.    4. Reviewed avoiding sweets, patient admits to eating jelly and ice cream often recently. Linh Dillard alternatives to sweets- sugar free pudding, jello,  No added sugar ice cream and limiting amount to 1/2 cup. Encouraged patient to keep sweets out of the house as much as possible and limit to once a week or less.   Patient states not at home but sugars are improving- couldn't give me readings. Reviewed portion control and avoiding sweets- keeping them out of the house. Carole Barnes follow up in 3-4 weeks to review and answer questions.      Handout mailed- healthy eating tips for the holidays  Ramy Hidalgo

## 2022-01-03 ENCOUNTER — CARE COORDINATION (OUTPATIENT)
Dept: CARE COORDINATION | Age: 62
End: 2022-01-03

## 2022-01-03 DIAGNOSIS — E11.42 TYPE 2 DIABETES MELLITUS WITH DIABETIC POLYNEUROPATHY, WITH LONG-TERM CURRENT USE OF INSULIN (HCC): ICD-10-CM

## 2022-01-03 DIAGNOSIS — Z79.4 TYPE 2 DIABETES MELLITUS WITH DIABETIC POLYNEUROPATHY, WITH LONG-TERM CURRENT USE OF INSULIN (HCC): ICD-10-CM

## 2022-01-03 DIAGNOSIS — Z76.0 MEDICATION REFILL: ICD-10-CM

## 2022-01-03 RX ORDER — FENOFIBRATE 67 MG/1
CAPSULE ORAL
Qty: 90 CAPSULE | Refills: 1 | Status: SHIPPED | OUTPATIENT
Start: 2022-01-03 | End: 2022-08-10 | Stop reason: SDUPTHER

## 2022-01-03 NOTE — TELEPHONE ENCOUNTER
Health Maintenance   Topic Date Due    COVID-19 Vaccine (1) Never done    DTaP/Tdap/Td vaccine (1 - Tdap) Never done    Shingles Vaccine (1 of 2) Never done    Diabetic foot exam  03/29/2017    Annual Wellness Visit (AWV)  Never done    Lipid screen  05/20/2020    Breast cancer screen  06/24/2021    Potassium monitoring  07/13/2021    Creatinine monitoring  07/13/2021    Flu vaccine (1) 09/01/2021    A1C test (Diabetic or Prediabetic)  10/12/2021    Diabetic retinal exam  01/20/2022    Depression Screen  07/12/2022    Pneumococcal 0-64 years Vaccine (2 of 2 - PPSV23) 09/16/2025    Colon cancer screen colonoscopy  06/25/2028    Hepatitis C screen  Completed    HIV screen  Completed    Hepatitis A vaccine  Aged Out    Hib vaccine  Aged Out    Meningococcal (ACWY) vaccine  Aged Out             (applicable per patient's age: Cancer Screenings, Depression Screening, Fall Risk Screening, Immunizations)    Hemoglobin A1C (%)   Date Value   07/12/2021 10.3   02/10/2021 11.0   03/25/2019 9.6     Microalb/Crt.  Ratio (mcg/mg creat)   Date Value   04/04/2015 3,460     LDL Cholesterol (mg/dL)   Date Value   04/04/2015          AST (U/L)   Date Value   04/04/2015 19     ALT (U/L)   Date Value   04/04/2015 24     BUN (mg/dL)   Date Value   04/04/2015 14      (goal A1C is < 7)   (goal LDL is <100) need 30-50% reduction from baseline     BP Readings from Last 3 Encounters:   07/12/21 (!) 140/66   02/10/21 128/65   02/17/20 109/61    (goal /80)      All Future Testing planned in [facility]:  Lab Frequency Next Occurrence   XR HIP RIGHT (2-3 VIEWS) Once 05/25/2021   XR LUMBAR SPINE (2-3 VIEWS) Once 05/25/2021   EDITH Digital Screen Bilateral [[id number]] Once 11/26/2021   Lipid, Fasting Once 11/19/2021   US PELVIS COMPLETE Once 10/27/2021       Next Visit Date:  Future Appointments   Date Time Provider Jack Shah   1/17/2022 10:00 AM KLARISSA Brown - [facility] Lewis [facility]            Patient Active Problem List:     Malignant neoplasm of uterus (HCC)     Acid reflux     Heart murmur     Class 3 severe obesity due to excess calories with serious comorbidity and body mass index (BMI) of 40.0 to 44.9 in adult [facility])     Type 2 diabetes mellitus treated with insulin ([facility]     Microalbuminuria     Noncompliance with treatment     Hypokalemia     Lymphedema of leg     Primary osteoarthritis of both knees     Bad memory     Unsteady gait     Calculus of gallbladder     Weakness of left side of body     AF (amaurosis fugax)     Chronic kidney disease     Abnormal liver enzymes     Ataxic aphasia     Herpes simplex type 2 infection     Temporary cerebral vascular dysfunction     Moderate or severe vision impairment, both eyes     Familial hyperlipidemia     Essential hypertension     Occipital cerebral infarction [facility])     Drug overdose     Hypersomnolence     Ischemic stroke (HCC)     Right hemiparesis ([facility]

## 2022-01-03 NOTE — CARE COORDINATION
Nutrition Care Coordinator Follow-Up visit:    Food Recall: eating 2-3 meals/d    Activity Level:  Sedentary: X  Lightly Active: Moderately Active:  Very Active:    Adult BMI:  Underweight (below 18.5)  Normal Weight (18.5-24.9)  Overweight (25-29. 9)  Obese (30-39. 9)  Morbidly Obese (>40) X    Weight Change: no change    Plan:  Plan was established with patient:  Increase dietary fiber by consuming whole grains, fruits and vegetables: X  Limit dietary cholesterol to >200mg/day: Increase water intake:  Avoid added sugar: X  Avoid sweetened beverages: X  Choose lean meats:      Monitoring: Will monitor weight:  Will monitor adherence to meal plan: Will monitor adherence to exercise plan: Will monitor HGA1c: X    Handouts Provided :  Low Carb snacking: X  Carb counting /individual meal plan:  Portion Control: X  Food Labels:  Physical Activity:  Low Fat/Cholesterol:  Hypo/Hyperglycemia:  Calorie Controlled Meal Plan:    Goals: Increase water consumption to 8oz. 6-8 times daily:  Manage blood sugars by consuming 3 meals spaced every 4-5 hours with 2-3 snacks daily: reviewed  Increase fiber and decrease fat intake by consuming 1-2 fruit servings and 2-3 vegetable servings per day. Increase physical activity by: encouraged daily walking as tolerated  Consume less than 2,000mg of sodium/day  Avoid consumption of sweetened beverages and added sugar by reading food labels:reviewed  Monitor blood sugars by using meter to check blood glucose before morning meal and 2 hours after a meal daily:BS high 170-200's per patient admits she has not been eating right especially with the holidays  Decrease risk of coronary heart disease by consuming fish that contains omega-3 fatty acids at least twice a week, avoiding partially hydrogenated oil/trans fats and limiting saturated fat intake by reading food labels: reviewed    Patient goals set:  1.  Reviewed what a serving size is of carbs and how to measure servings out to limit carbohydrates at meals and snacks.  Goal is 60gms of carb/meal, 15-30gms/snack. Low carb snacking and having a snack before bed time reviewed. 2. Patient will eat 3 meals daily spaced every 4-5 hours. Reviewed quick/easy lunch and breakfast suggestions. Reviewed using Glucerna- when going too long between meals of skipping meals. 3. Reviewed working on increasing non-starchy vegetables and lean protein sources. Encouraged to increase lean meats, eggs, cottage cheese, nut/seeds.  Reviewed what non-starchy vegetables are and encouraged patient to make 1/2  plate non-starchy vegetables, 1/4 lean protein, 1/4 carbs at meals. Foods from each category reviewed along with serving size.    4. Reviewed avoiding sweets, patient admits holidays were hard did not always eat right but wants to get back on track.  Reviewed alternatives to sweets- sugar free pudding, jello,  No added sugar ice cream and limiting amount to 1/2 cup. Encouraged patient to keep sweets out of the house as much as possible and limit to once a week or less.   Patient states sugars high again, blames the holidays and states she is going to work on getting back on track. Reviewed portion control and avoiding sweets- keeping them out of the house.  Will follow up in 3-4 weeks to review and answer questions.      Mailed patient more Glucerna coupons  Ez Wahl

## 2022-01-14 ENCOUNTER — CARE COORDINATION (OUTPATIENT)
Dept: CARE COORDINATION | Age: 62
End: 2022-01-14

## 2022-01-14 NOTE — CARE COORDINATION
Ambulatory Care Coordination Note  1/14/2022  CM Risk Score: 5  Charlson 10 Year Mortality Risk Score: 100%     ACC: Kimmie Bishop    Summary Note: Georgiana Garland has no c/o today. She reports she does need assistance finding a dentist. CM shared the phone number on her [facility] card to call for assistance. CM discussed graduation from care coordination with Georgiana Garland. She expressed regret that CM will not be calling. She however understands. CM related she will call occasionally to chat. Georgiana Garland was grateful. She continues to monitor her B/P BID for her nephrologist and blood sugars for her endocrinologist.  Plan:  F/U on Dre's call to [facility] seeking a dentist in 1 week. Care Coordination Interventions    Program Enrollment: Complex Care  Referral from Primary Care Provider: No  Suggested Interventions and Community Resources  Diabetes Education: Completed  Zone Management Tools: Completed  Other Services or Interventions: An adult sitter stays with Georgiana Garland while her brother is at work         Goals Addressed    None         Prior to Admission medications    Medication Sig Start Date End Date Taking?  Authorizing Provider   fenofibrate micronized (LOFIBRA) 67 MG capsule TAKE 1 CAPSULE BY MOUTH EVERY DAY IN THE MORNING BEFORE BREAKFAST 1/3/22   KLARISSA Huerta CNP   NIFEdipine (ADALAT CC) 90 MG extended release tablet Take 90 mg by mouth daily    Historical Provider, MD   famotidine (PEPCID) 20 MG tablet TAKE 1 TABLET BY MOUTH EVERY DAY 12/2/21   KLARISSA Huerta CNP   atorvastatin (LIPITOR) 80 MG tablet Take 1 tablet by mouth daily 10/12/21   KLARISSA Huerta CNP   cloNIDine (CATAPRES) 0.3 MG tablet Take 1 tablet by mouth 3 times daily 7/12/21 8/11/21  KLARISSA Huerta CNP   furosemide (LASIX) 80 MG tablet Take 1 tablet by mouth See Admin Instructions 80 mg in AM, 40 mg in PM 7/12/21   KLARISSA Huerta CNP   amLODIPine (NORVASC) 5 MG tablet Take 1 tablet by mouth daily  Patient taking differently: Take 10 mg by mouth daily  6/30/21   Monika Poag, APRN - CNP   lisinopril (PRINIVIL;ZESTRIL) 40 MG tablet Take 40 mg by mouth daily    Historical Provider, MD   insulin regular human (HUMULIN R U-500 KWIKPEN) 500 UNIT/ML SOPN concentrated injection pen Inject 110 Units into the skin every morning (before breakfast)    Historical Provider, MD   insulin regular human (HUMULIN R U-500 KWIKPEN) 500 UNIT/ML SOPN concentrated injection pen Inject 25 Units into the skin daily (before lunch)    Historical Provider, MD   insulin regular human (HUMULIN R U-500 KWIKPEN) 500 UNIT/ML SOPN concentrated injection pen Inject 90 Units into the skin Daily with supper    Historical Provider, MD   Cholecalciferol (VITAMIN D3) 50 MCG (2000 UT) CAPS TAKE 1 CAPSULE BY MOUTH EVERY DAY 11/24/20   Historical Provider, MD   acetaminophen (TYLENOL 8 HOUR) 650 MG extended release tablet Take 1 tablet by mouth 2 times daily as needed for Pain 2/10/21 7/12/21  Ricki Patterson PA-C   Handicap Placard MISC by Does not apply route 9/16/19   Ricki Patterson PA-C   Multiple Vitamins-Minerals [facility] SYSTEM ADVANCED 50 PLUS) TABS Take 1 tablet by mouth daily 1/14/19   Ricki Patterson PA-C   docusate sodium (COLACE) 100 MG capsule Take 100 mg by mouth 2 times daily    Historical Provider, MD   omega-3 acid ethyl esters (LOVAZA) 1 g capsule Take 2 g by mouth 2 times daily    Historical Provider, MD   lidocaine (LIDODERM) 5 % Place 1 patch onto the skin daily 12 hours on, 12 hours off. Historical Provider, MD   Compression Stockings MISC Provide compressions stocking covered by insurance, 20 -30 6/4/18   Ricki Patterson PA-C   Miscandice.  Devices (COMMODE BEDSIDE) [facility] Provide bedside commode covered by insurance 3/21/18   Ricki Patterson PA-C   aspirin 81 MG chewable tablet Take 81 mg by mouth daily 2/9/18   Historical Provider, MD Mandy Zhu 352 MG/ML SOAJ  1/15/18   Historical Provider, MD   ARTIFICIAL TEARS 0.2-0. 2-1 % SOLN  2/9/18   Historical Provider, MD   Ascorbic Acid (VITAMIN C) 1000 MG tablet Take 1,000 mg by mouth daily 2/9/18   Historical Provider, MD   latanoprost (XALATAN) 0.005 % ophthalmic solution Place 1 drop into the right eye daily  12/4/17   Historical Provider, MD [employer] Devices (TRANSFER BENCH) MISC Use shower transfer bench with back,  every time patient gets in/or out of the shower. Please dispense insurance approved shower transfer bench. 8/15/17   Roman Hutchinson MD   carvedilol (COREG) 6.25 MG tablet Take 12.5 mg by mouth 2 times daily (with meals)     Historical Provider, MD   [facility] [id number] [facility]  10/5/16   Historical Provider, MD   ONE TOUCH ULTRA TEST strip  1/24/16   Historical Provider, MD   Blood Pressure Monitoring (B-D ASSURE BPM/AUTO WRIST CUFF) MISC As directed. Labile hypertension.  1/25/16   Roman Hutchinson MD   B-D ULTRAFINE III SHORT PEN 31G X 8 MM MISC  3/15/14   Historical Provider, MD       Future Appointments   Date Time Provider Jack Shah   1/17/2022 10:00 AM Fortino Combs, APRN - [address] [facility]

## 2022-01-17 ENCOUNTER — OFFICE VISIT (OUTPATIENT)
Dept: PRIMARY CARE CLINIC | Age: 62
End: 2022-01-17
Payer: MEDICARE

## 2022-01-17 ENCOUNTER — CARE COORDINATION (OUTPATIENT)
Dept: CARE COORDINATION | Age: 62
End: 2022-01-17

## 2022-01-17 VITALS
OXYGEN SATURATION: 99 % | BODY MASS INDEX: 46.1 KG/M2 | WEIGHT: 277 LBS | DIASTOLIC BLOOD PRESSURE: 75 MMHG | SYSTOLIC BLOOD PRESSURE: 148 MMHG | HEART RATE: 54 BPM | TEMPERATURE: 97 F

## 2022-01-17 DIAGNOSIS — S81.801A WOUND OF RIGHT LOWER EXTREMITY, INITIAL ENCOUNTER: ICD-10-CM

## 2022-01-17 DIAGNOSIS — C55 MALIGNANT NEOPLASM OF UTERUS, UNSPECIFIED SITE (HCC): ICD-10-CM

## 2022-01-17 DIAGNOSIS — Z23 NEED FOR INFLUENZA VACCINATION: ICD-10-CM

## 2022-01-17 DIAGNOSIS — G81.91 RIGHT HEMIPARESIS (HCC): ICD-10-CM

## 2022-01-17 DIAGNOSIS — Z79.4 TYPE 2 DIABETES MELLITUS WITH DIABETIC POLYNEUROPATHY, WITH LONG-TERM CURRENT USE OF INSULIN (HCC): Primary | ICD-10-CM

## 2022-01-17 DIAGNOSIS — E11.42 TYPE 2 DIABETES MELLITUS WITH DIABETIC POLYNEUROPATHY, WITH LONG-TERM CURRENT USE OF INSULIN (HCC): Primary | ICD-10-CM

## 2022-01-17 LAB — HBA1C MFR BLD: 10.4 %

## 2022-01-17 PROCEDURE — 3046F HEMOGLOBIN A1C LEVEL >9.0%: CPT | Performed by: NURSE PRACTITIONER

## 2022-01-17 PROCEDURE — 1036F TOBACCO NON-USER: CPT | Performed by: NURSE PRACTITIONER

## 2022-01-17 PROCEDURE — 99214 OFFICE O/P EST MOD 30 MIN: CPT | Performed by: NURSE PRACTITIONER

## 2022-01-17 PROCEDURE — 90674 CCIIV4 VAC NO PRSV 0.5 ML IM: CPT | Performed by: NURSE PRACTITIONER

## 2022-01-17 PROCEDURE — 2022F DILAT RTA XM EVC RTNOPTHY: CPT | Performed by: NURSE PRACTITIONER

## 2022-01-17 PROCEDURE — 83036 HEMOGLOBIN GLYCOSYLATED A1C: CPT | Performed by: NURSE PRACTITIONER

## 2022-01-17 PROCEDURE — G8427 DOCREV CUR MEDS BY ELIG CLIN: HCPCS | Performed by: NURSE PRACTITIONER

## 2022-01-17 PROCEDURE — 3017F COLORECTAL CA SCREEN DOC REV: CPT | Performed by: NURSE PRACTITIONER

## 2022-01-17 PROCEDURE — G8417 CALC BMI ABV UP PARAM F/U: HCPCS | Performed by: NURSE PRACTITIONER

## 2022-01-17 PROCEDURE — G8482 FLU IMMUNIZE ORDER/ADMIN: HCPCS | Performed by: NURSE PRACTITIONER

## 2022-01-17 PROCEDURE — G0008 ADMIN INFLUENZA VIRUS VAC: HCPCS | Performed by: NURSE PRACTITIONER

## 2022-01-17 ASSESSMENT — PATIENT HEALTH QUESTIONNAIRE - PHQ9
SUM OF ALL RESPONSES TO PHQ QUESTIONS 1-9: 2
SUM OF ALL RESPONSES TO PHQ9 QUESTIONS 1 & 2: 2
SUM OF ALL RESPONSES TO PHQ QUESTIONS 1-9: 2
1. LITTLE INTEREST OR PLEASURE IN DOING THINGS: 1
2. FEELING DOWN, DEPRESSED OR HOPELESS: 1

## 2022-01-17 ASSESSMENT — ENCOUNTER SYMPTOMS
WHEEZING: 0
BACK PAIN: 0
BLURRED VISION: 1
VOMITING: 0
COUGH: 0
TROUBLE SWALLOWING: 0
SHORTNESS OF BREATH: 1
DIARRHEA: 0
CONSTIPATION: 0
NAUSEA: 0

## 2022-01-17 NOTE — CARE COORDINATION
CM attempted to contact [facility] for assistance in finding a dentist and ophthalmologist for Bishnu Sethi. The offices are closed today. Plan:  Attempt to call again tomorrow.

## 2022-01-17 NOTE — PROGRESS NOTES
[facility]  [id number] [facility] [address]  Dept: [phone]  Dept Fax: [phone]    Patient Care Team:  KLARISSA Green CNP as PCP - General (Family Medicine)  KLARISSA Green CNP as PCP - [address] [place] [address] Dr King Provider  Fabio Soriano as Consulting Physician (Endocrinology)  KLARISSA Hayden CNP as Nurse Practitioner (Neurology)  Cliff Dalton MD as Consulting Physician (Ophthalmology)  Hugo Standing as Ambulatory Care Manager  Dianne Voss RD, LD as Dietitian    2022     Skylar Anders (:  [id number])LL a 64 y.o. female, here for evaluation of the following medical concerns:   Chief Complaint   Patient presents with    6 Month Follow-Up     DM, HTN    Health Maintenance     Flu Vaccine     Skylar Anders is a 42-year-old female here today for routine follow-up for chronic conditions. History of diabetes and hypertension. She is previously overdue to see endocrinology for uncontrolled diabetes. Currently under care of nephrology, seen      Ms. Kelsey Jo is here today to follow-up for diabetes hypertension and chronic conditions. States she saw Dr Fuad Poole for her diabetes, last visit in October with the NP    Had a visit with Nephrology last month, did blood work and an [address] of her kidney. Lowered dosing of carvedilol, 1/2 tablet twice a day  Switched her lasix to 80 mg and morning and 40 mg in PM  Also inc clonidine to 0.3mg three times a day    Follows with eye doctor, goes back on . Used to get injections. States she bleeding behind the eyes has stopped  Needs a new doctor due to her insurance      Takes U-500 insulin before meals  Also on an injectable twice monthly for cholesterol    Hx of uterine cancer, had a hysterectomy. States she was told she did not have to follow up with GYN anymore. Will get a pulling sensation in her right groin at times.  She has 1 ovary, not sure which side  Surgery was done in [facility], [address] with cardiology yearly, through [address]    Since last visit, she tripped over a laundry basket about 2 weeks ago. She still has a sore to her right shin. Denies any leg pain, former smoker. Hypertension  This is a chronic problem. The problem is unchanged. The problem is controlled. Associated symptoms include blurred vision, peripheral edema, shortness of breath (on exertion) and sweats. Pertinent negatives include no chest pain, headaches or neck pain. Risk factors for coronary artery disease include obesity, post-menopausal state and diabetes mellitus. Past treatments include diuretics, ACE inhibitors and beta blockers. Hypertensive end-organ damage includes kidney disease and CVA. Diabetes  She presents for her follow-up diabetic visit. She has type 2 diabetes mellitus. Her disease course has been stable. Hypoglycemia symptoms include sweats. Pertinent negatives for hypoglycemia include no dizziness, headaches or tremors. Associated symptoms include blurred vision. Pertinent negatives for diabetes include no chest pain. There are no hypoglycemic complications. Symptoms are stable. Diabetic complications include a CVA. Current diabetic treatment includes insulin injections. .    Review of Systems   Constitutional: Negative for chills and fever. HENT: Negative for congestion and trouble swallowing. Eyes: Positive for blurred vision. Respiratory: Positive for shortness of breath (on exertion). Negative for cough and wheezing. Cardiovascular: Positive for leg swelling. Negative for chest pain. Gastrointestinal: Negative for constipation, diarrhea, nausea and vomiting. Genitourinary: Negative for dysuria, frequency and urgency. Musculoskeletal: Positive for arthralgias. Negative for back pain, myalgias and neck pain. Neurological: Negative for dizziness, tremors and headaches.        Prior to Visit Medications    Medication Sig Taking?  Authorizing Provider   fenofibrate micronized (LOFIBRA) 67 MG capsule TAKE 1 CAPSULE BY MOUTH EVERY DAY IN THE MORNING BEFORE BREAKFAST Yes KLARISSA Dhillon CNP   NIFEdipine (ADALAT CC) 90 MG extended release tablet Take 90 mg by mouth daily Yes Historical Provider, MD   famotidine (PEPCID) 20 MG tablet TAKE 1 TABLET BY MOUTH EVERY DAY Yes KLARISSA Dhillon CNP   atorvastatin (LIPITOR) 80 MG tablet Take 1 tablet by mouth daily Yes KLARISSA Dhillon CNP   cloNIDine (CATAPRES) 0.3 MG tablet Take 1 tablet by mouth 3 times daily Yes KLARISSA Dhillon CNP   furosemide (LASIX) 80 MG tablet Take 1 tablet by mouth See Admin Instructions 80 mg in AM, 40 mg in PM Yes KLARISSA Dhillon CNP   amLODIPine (NORVASC) 5 MG tablet Take 1 tablet by mouth daily  Patient taking differently: Take 10 mg by mouth daily  Yes KLARISSA Dhillon CNP   lisinopril (PRINIVIL;ZESTRIL) 40 MG tablet Take 40 mg by mouth daily Yes Historical Provider, MD   insulin regular human (HUMULIN R U-500 KWIKPEN) 500 UNIT/ML SOPN concentrated injection pen Inject 110 Units into the skin every morning (before breakfast) Yes Historical Provider, MD   insulin regular human (HUMULIN R U-500 KWIKPEN) 500 UNIT/ML SOPN concentrated injection pen Inject 25 Units into the skin daily (before lunch) Yes Historical Provider, MD   insulin regular human (HUMULIN R U-500 KWIKPEN) 500 UNIT/ML SOPN concentrated injection pen Inject 90 Units into the skin Daily with supper Yes Historical Provider, MD   Cholecalciferol (VITAMIN D3) 50 MCG (2000 UT) CAPS TAKE 1 CAPSULE BY MOUTH EVERY DAY Yes Historical Provider, MD   acetaminophen (TYLENOL 8 HOUR) 650 MG extended release tablet Take 1 tablet by mouth 2 times daily as needed for Pain Yes Samanta Combs PA-C   Handicap Placard MISC by Does not apply route Yes Samanta Combs PA-C   Multiple Vitamins-Minerals (THERAGRAN-M ADVANCED 50 PLUS) TABS Take 1 tablet by mouth daily Yes Liza Gallardo PA-C   docusate sodium (COLACE) 100 MG capsule Take 100 mg by mouth 2 times daily Yes Historical Provider, MD   omega-3 acid ethyl esters (LOVAZA) 1 g capsule Take 2 g by mouth 2 times daily Yes Historical Provider, MD   lidocaine (LIDODERM) 5 % Place 1 patch onto the skin daily 12 hours on, 12 hours off. Yes Historical Provider, MD   Compression Stockings MISC Provide compressions stocking covered by insurance, 20 -30 Yes Liza Gallardo PA-C   Mis. Devices (COMMODE BEDSIDE) MIS Provide bedside commode covered by insurance Yes Liza Gallardo PA-C   aspirin 81 MG chewable tablet Take 81 mg by mouth daily Yes Historical Provider, MD Sanchez Rachel 522 MG/ML SOAJ  Yes Historical Provider, MD   ARTIFICIAL TEARS 0.2-0.2-1 % SOLN  Yes Historical Provider, MD   Ascorbic Acid (VITAMIN C) 1000 MG tablet Take 1,000 mg by mouth daily Yes Historical Provider, MD   latanoprost (XALATAN) 0.005 % ophthalmic solution Place 1 drop into the right eye daily  Yes Historical Provider, MD   Misc. Devices (TRANSFER BENCH) MISC Use shower transfer bench with back,  every time patient gets in/or out of the shower. Please dispense insurance approved shower transfer bench. Yes Ese Alcazar MD   carvedilol (COREG) 6.25 MG tablet Take 12.5 mg by mouth 2 times daily (with meals)  Yes Historical Provider, MD Ramachandran Plume LANCETS [address]A [address] [facility]  Yes Historical Provider, MD   ONE TOUCH ULTRA TEST strip  Yes Historical Provider, MD   Blood Pressure Monitoring (B-D ASSURE BPM/AUTO WRIST CUFF) MISC As directed. Labile hypertension. Yes Ese Alcazar MD   B-D ULTRAFINE III SHORT PEN 31G X 8 MM MISC  Yes Historical Provider, MD        Social History     Tobacco Use    Smoking status: Former Smoker     Packs/day: 0.50     Start date: 1973     Quit date: 2000     Years since quittin.1    Smokeless tobacco: Never Used   Substance Use Topics    Alcohol use:  No Vitals:    22 1043 22 1136   BP: (!) 161/80 (!) 148/75   Site: Left Upper Arm    Position: Sitting    Pulse: 60 54   Temp: 97 °F (36.1 °C)    TempSrc: Temporal    SpO2: 99%    Weight: 277 lb (125.6 kg)      Estimated body mass index is 46.1 kg/m² as calculated from the following:    Height as of 2/10/21: 5' 5\" (1.651 m). Weight as of this encounter: 277 lb (125.6 kg). DIAGNOSTIC FINDINGS:  CBC:  Lab Results   Component Value Date    WBC 5.4 2015    HGB 14.2 2015     2015       BMP:    Lab Results   Component Value Date     2015    K 3.8 2020    K 4.2 2015    CL 96 2015    CO2 25 2015    BUN 14 2015    CREATININE 1.7 2020    GLUCOSE 204 2017       HEMOGLOBIN A1C:   Lab Results   Component Value Date    LABA1C 10.4 2022       FASTING LIPID PANEL:  Lab Results   Component Value Date    CHOL 291 (H) 2015    HDL 42 2015    TRIG 502 (H) 2015       Physical Exam  Vitals reviewed. Constitutional:       Appearance: Normal appearance. She is well-developed and well-groomed. She is obese. HENT:      Head: Normocephalic and atraumatic. Right Ear: External ear normal.      Left Ear: External ear normal.      Nose: Nose normal.   Eyes:      General: Lids are normal. No scleral icterus. Conjunctiva/sclera: Conjunctivae normal.      Pupils: Pupils are equal, round, and reactive to light. Cardiovascular:      Rate and Rhythm: Normal rate and regular rhythm. Heart sounds: Normal heart sounds. Pulmonary:      Effort: Pulmonary effort is normal.      Breath sounds: Normal breath sounds. No decreased air movement. Abdominal:      General: Bowel sounds are normal.      Palpations: Abdomen is soft. There is no mass. Tenderness: There is no abdominal tenderness. Musculoskeletal:      Cervical back: Normal range of motion and neck supple. Right lower le+ Pitting Edema present. Left lower le+ Pitting Edema present. Skin:     General: Skin is warm and dry. Neurological:      Mental Status: She is alert and oriented to person, place, and time. Gait: Gait abnormal.   Psychiatric:         Behavior: Behavior is cooperative. Thought Content: Thought content normal.         Judgment: Judgment normal.             ASSESSMENT     Diagnosis Orders   1. Type 2 diabetes mellitus with diabetic polyneuropathy, with long-term current use of insulin (HCC)  POCT glycosylated hemoglobin (Hb A1C)   2. Need for influenza vaccination  INFLUENZA, MDCK QUADV, 2 YRS AND OLDER, IM, PF, PREFILL SYR OR SDV, 0.5ML (FLUCELVAX QUADV, PF)   3. Right hemiparesis ([address]     4. Malignant neoplasm of uterus, unspecified site ([address]     5. Wound of right lower extremity, initial encounter  [address]:  No orders of the defined types were placed in this encounter. 1. Diabetes remains uncontrolled, concern for unhealing leg wound. [facility] placed to [facility]. 2. Influenza vaccine provided today. 3. Request last office notes from Suhail Araiza endocrinology  4. Follow-up in 4 months, consider altering blood pressure medication as patient is mildly elevated today  5. Message sent out to [facility] , Jeremy Patterson, patient does struggle with her eyesight and scheduling testing    FOLLOW UP AND INSTRUCTIONS:  Return in about 4 months (around 2022) for HTN. · Valery Vidal received counseling on the following healthy behaviors:medication adherence    · Discussed use, benefit, and side effects of prescribed medications. Barriers to  medication compliance addressed. All patient questions answered. Pt  verbalized understanding of all instructions given.     · Patient given educational materials - see patient instructions      · Patient advised to contact scheduling offices for any referrals or imaging orders  placed today if they have not been contacted in 48 hours. Return in about 4 months (around 5/17/2022) for HTN. An electronic signature was used to authenticate this note. --KLARISSA Steiner CNP on 1/17/2022 at 12:16 PM  Visit Information    Have you changed or started any medications since your last visit including any over-the-counter medicines, vitamins, or herbal medicines? no   Are you having any side effects from any of your medications? -  no  Have you stopped taking any of your medications? Is so, why? -  no    Have you seen any other physician or provider since your last visit? Yes - Records Obtained  Have you had any other diagnostic tests since your last visit? No  Have you been seen in the emergency room and/or had an admission to a hospital since we last saw you? No  Have you had your routine dental cleaning in the past 6 months? no    Have you activated your [employer] account? If not, what are your barriers?  Yes     Patient Care Team:  KLARISSA Steiner CNP as PCP - General (Family Medicine)  KL[facility]  Andrez Ybarra as Consulting Physician (Endocrinology)  KLARISSA Roberto CNP as Nurse Practitioner (Neurology)  Yinka Sorto MD as Consulting Physician (Ophthalmology)  Shorty Quan as Ambulatory Care Manager  Tracy Rosado RD, LD as Dietitian    Medical History Review  Past Medical, Family, and Social History reviewed and does not contribute to the patient presenting condition    Health Maintenance   Topic Date Due    DTaP/Tdap/Td vaccine (1 - Tdap) Never done    Shingles Vaccine (1 of 2) Never done    Diabetic foot exam  03/29/2017    Annual Wellness Visit (AWV)  Never done    Lipid screen  05/20/2020    Breast cancer screen  06/24/2021    Potassium monitoring  07/13/2021    Creatinine monitoring  07/13/2021    Diabetic retinal exam  01/20/2022    COVID-19 Vaccine (3 - Booster for Dicie Sorrel series) 04/03/2022    A1C test (Diabetic or Prediabetic)  04/17/2022    Depression Screen  07/12/2022    Pneumococcal 0-64 years Vaccine (2 of 2 - PPSV23) 09/16/2025    Colon cancer screen colonoscopy  06/25/2028    Flu vaccine  Completed    Hepatitis C screen  Completed    HIV screen  Completed    Hepatitis A vaccine  Aged Out    Hib vaccine  Aged Out    Meningococcal (ACWY) vaccine  Aged Out

## 2022-01-18 ENCOUNTER — CARE COORDINATION (OUTPATIENT)
Dept: CARE COORDINATION | Age: 62
End: 2022-01-18

## 2022-01-18 NOTE — CARE COORDINATION
Ambulatory Care Coordination Note  1/18/2022   Risk Score: 5  Charlson 10 Year Mortality Risk Score: 100%     ACC: Brittany Joya    Summary Note: RADHA spoke with Jeanne Richardson, [facility] dental division. She reports the list of dentists in [facility] zip code is [place]. Jeanne Richardson is agreeable to email the list to CM. Cm received the list of dentists for [employer]. CM spoke with Cyndee Calloway in medical who agreed to email CM a list of ophthalmologists that Yes[place]ian [place] can review and call to establish care. He reports he will have the emailed list to CM by noon today.  left with [employer] requesting a return call to [phone]. Plan:  Reach out to [facility] if a list is not found in 's email from Cyndee Calloway. Call [employer] this afternoon if no return call. Care Coordination Interventions    Program Enrollment: Complex Care  Referral from Primary Care Provider: No  Suggested Interventions and Community Resources  Diabetes Education: Completed  Zone Management Tools: Completed  Other Services or Interventions: An adult sitter stays with Leon Danielle while her brother is at work         Goals Addressed    None         Prior to Admission medications    Medication Sig Start Date End Date Taking?  Authorizing Provider   fenofibrate micronized (LOFIBRA) 67 MG capsule TAKE 1 CAPSULE BY MOUTH EVERY DAY IN THE MORNING BEFORE BREAKFAST 1/3/22   KLARISSA Skaggs CNP   NIFEdipine (ADALAT CC) 90 MG extended release tablet Take 90 mg by mouth daily    Historical Provider, MD   famotidine (PEPCID) 20 MG tablet TAKE 1 TABLET BY MOUTH EVERY DAY 12/2/21   KLARISSA Skaggs CNP   atorvastatin (LIPITOR) 80 MG tablet Take 1 tablet by mouth daily 10/12/21   KLARISSA Skaggs CNP   cloNIDine (CATAPRES) 0.3 MG tablet Take 1 tablet by mouth 3 times daily 7/12/21 1/17/22  KLARISSA Skaggs CNP   furosemide (LASIX) 80 MG tablet Take 1 tablet by mouth See Admin Instructions 80 mg in AM, 40 mg in PM 7/12/21   KLARISSA Skaggs CNP   amLODIPine (NORVASC) 5 MG tablet Take 1 tablet by mouth daily  Patient taking differently: Take 10 mg by mouth daily  6/30/21   KLARISSA Mccord CNP   lisinopril (PRINIVIL;ZESTRIL) 40 MG tablet Take 40 mg by mouth daily    Historical Provider, MD   insulin regular human (HUMULIN R U-500 KWIKPEN) 500 UNIT/ML SOPN concentrated injection pen Inject 110 Units into the skin every morning (before breakfast)    Historical Provider, MD   insulin regular human (HUMULIN R U-500 KWIKPEN) 500 UNIT/ML SOPN concentrated injection pen Inject 25 Units into the skin daily (before lunch)    Historical Provider, MD   insulin regular human (HUMULIN R U-500 KWIKPEN) 500 UNIT/ML SOPN concentrated injection pen Inject 90 Units into the skin Daily with supper    Historical Provider, MD   Cholecalciferol (VITAMIN D3) 50 MCG (2000 UT) CAPS TAKE 1 CAPSULE BY MOUTH EVERY DAY 11/24/20   Historical Provider, MD   acetaminophen (TYLENOL 8 HOUR) 650 MG extended release tablet Take 1 tablet by mouth 2 times daily as needed for Pain 2/10/21 1/17/22  Rima Haskins PA-C   Handicap Placard MISC by Does not apply route 9/16/19   Rima Haskins PA-C   Multiple Vitamins-Minerals [facility] SYSTEM ADVANCED 50 PLUS) TABS Take 1 tablet by mouth daily 1/14/19   Rima Haskins PA-C   docusate sodium (COLACE) 100 MG capsule Take 100 mg by mouth 2 times daily    Historical Provider, MD   omega-3 acid ethyl esters (LOVAZA) 1 g capsule Take 2 g by mouth 2 times daily    Historical Provider, MD   lidocaine (LIDODERM) 5 % Place 1 patch onto the skin daily 12 hours on, 12 hours off. Historical Provider, MD   Compression Stockings MISC Provide compressions stocking covered by insurance, 20 -30 6/4/18   Rima Haskins PA-C   [facility]  Devices (COMMODE BEDSIDE) [facility] Provide bedside commode covered by insurance 3/21/18   Rima Haskins PA-C   aspirin 81 MG chewable tablet Take 81 mg by mouth daily 2/9/18   Historical Provider, MD Millie Kaur 718 MG/ML SOAJ  1/15/18 Historical Provider, MD   ARTIFICIAL TEARS 0.2-0.2-1 % SOLN  2/9/18   Historical Provider, MD   Ascorbic Acid (VITAMIN C) 1000 MG tablet Take 1,000 mg by mouth daily 2/9/18   Historical Provider, MD   latanoprost (XALATAN) 0.005 % ophthalmic solution Place 1 drop into the right eye daily  12/4/17   Historical Provider, MD   Misc. Devices (TRANSFER BENCH) MISC Use shower transfer bench with back,  every time patient gets in/or out of the shower. Please dispense insurance approved shower transfer bench. 8/15/17   Cammie Boyce MD   carvedilol (COREG) 6.25 MG tablet Take 12.5 mg by mouth 2 times daily (with meals)     Historical Provider, MD   [place] LANCETS 88F [address] [facility]  10/5/16   Historical Provider, MD   ONE TOUCH ULTRA TEST strip  1/24/16   Historical Provider, MD   Blood Pressure Monitoring (B-D ASSURE BPM/AUTO WRIST CUFF) MISC As directed. Labile hypertension.  1/25/16   Cammie Boyce MD   B-D ULTRAFINE III SHORT PEN 31G X 8 MM MISC  3/15/14   Historical Provider, MD       Future Appointments   Date Time Provider Jack Alyssa   1/24/2022 12:45 PM MD BELLO Nails W[facility] [place] Katia Galvan   5/16/2022 10:00 AM KLARISSA Quevedo - CNP [facility]

## 2022-01-19 ENCOUNTER — CARE COORDINATION (OUTPATIENT)
Dept: CARE COORDINATION | Age: 62
End: 2022-01-19

## 2022-01-20 ENCOUNTER — CARE COORDINATION (OUTPATIENT)
Dept: CARE COORDINATION | Age: 62
End: 2022-01-20

## 2022-01-20 NOTE — CARE COORDINATION
Nutrition Care Coordinator Follow-Up visit:    Food Recall: eating 2-3 meals/d    Activity Level:  Sedentary: X  Lightly Active: Moderately Active:  Very Active:    Adult BMI:  Underweight (below 18.5)  Normal Weight (18.5-24.9)  Overweight (25-29. 9)  Obese (30-39. 9)  Morbidly Obese (>40) X    Weight Change: weight is up 3# over past 5 months    Plan:  Plan was established with patient:  Increase dietary fiber by consuming whole grains, fruits and vegetables: X  Limit dietary cholesterol to >200mg/day: Increase water intake:  Avoid added sugar: X  Avoid sweetened beverages: X  Choose lean meats: X      Monitoring: Will monitor weight:  Will monitor adherence to meal plan: Will monitor adherence to exercise plan: Will monitor HGA1c: x    Handouts Provided :  Low Carb snacking: X  Carb counting /individual meal plan:  Portion Control: X  Food Labels:  Physical Activity:  Low Fat/Cholesterol:  Hypo/Hyperglycemia:  Calorie Controlled Meal Plan:    Goals: Increase water consumption to 8oz. 6-8 times daily:  Manage blood sugars by consuming 3 meals spaced every 4-5 hours with 2-3 snacks daily: reviewed  Increase fiber and decrease fat intake by consuming 1-2 fruit servings and 2-3 vegetable servings per day. Increase physical activity by: encouraged daily walking as tolerated  Consume less than 2,000mg of sodium/day: reviewed  Avoid consumption of sweetened beverages and added sugar by reading food labels: reviewed  Monitor blood sugars by using meter to check blood glucose before morning meal and 2 hours after a meal daily:-200s per patient  Decrease risk of coronary heart disease by consuming fish that contains omega-3 fatty acids at least twice a week, avoiding partially hydrogenated oil/trans fats and limiting saturated fat intake by reading food labels:reviewed    Patient goals set:  1.  Reviewed what a serving size is of carbs and how to measure servings out to limit carbohydrates at meals and snacks.  Goal is 60gms of carb/meal, 15-30gms/snack. Low carb snacking and having a snack before bed time reviewed. 2. Patient will eat 3 meals daily spaced every 4-5 hours. Reviewed quick/easy lunch and breakfast suggestions.  Reviewed using Glucerna- when going too long between meals of skipping meals. 3. Reviewed working on increasing non-starchy vegetables and lean protein sources. Focused on increasing protein sources due to leg wound- lean meats, eggs, cottage cheese, nut/seeds, ect.  Reviewed what non-starchy vegetables are and encouraged patient to make 1/2  plate non-starchy vegetables, 1/4 lean protein, 1/4 carbs at meals. Foods from each category reviewed along with serving size.    4. Reviewed avoiding sweets. Patient denies drinking sugary drinks.  Reviewed alternatives to sweets- sugar free pudding, jello,  No added sugar ice cream and limiting amount to 1/2 cup. Encouraged patient to keep sweets out of the house as much as possible and limit to once a week or less.   Patient states sugars are coming down and she is working on reducing portions, admits she over eats rice and potatoes. Reviewed portion control and measuring out portions at meals/snacks. We discussed increasing lean protein sources to promote wound healing. Will follow up in 3-4 weeks to review and answer questions. Mailed- handout on nutrition and wound healing to patient's home address.   Lissa Rangel

## 2022-01-24 ENCOUNTER — HOSPITAL ENCOUNTER (OUTPATIENT)
Dept: WOUND CARE | Age: 62
Discharge: HOME | End: 2022-01-24
Payer: MEDICARE

## 2022-01-24 DIAGNOSIS — S81.801A LEG WOUND, RIGHT, INITIAL ENCOUNTER: Primary | ICD-10-CM

## 2022-01-24 PROCEDURE — 99213 OFFICE O/P EST LOW 20 MIN: CPT

## 2022-01-24 PROCEDURE — 11042 DBRDMT SUBQ TIS 1ST 20SQCM/<: CPT

## 2022-01-24 PROCEDURE — 99203 OFFICE O/P NEW LOW 30 MIN: CPT | Performed by: INTERNAL MEDICINE

## 2022-01-24 PROCEDURE — 11042 DBRDMT SUBQ TIS 1ST 20SQCM/<: CPT | Performed by: INTERNAL MEDICINE

## 2022-01-24 RX ORDER — LIDOCAINE 50 MG/G
OINTMENT TOPICAL ONCE
Status: CANCELLED | OUTPATIENT
Start: 2022-01-24 | End: 2022-01-24

## 2022-01-24 RX ORDER — CLOBETASOL PROPIONATE 0.5 MG/G
OINTMENT TOPICAL ONCE
Status: CANCELLED | OUTPATIENT
Start: 2022-01-24 | End: 2022-01-24

## 2022-01-24 RX ORDER — GINSENG 100 MG
CAPSULE ORAL ONCE
OUTPATIENT
Start: 2022-01-24 | End: 2022-01-24

## 2022-01-24 RX ORDER — CLOBETASOL PROPIONATE 0.5 MG/G
OINTMENT TOPICAL ONCE
OUTPATIENT
Start: 2022-01-24 | End: 2022-01-24

## 2022-01-24 RX ORDER — BACITRACIN, NEOMYCIN, POLYMYXIN B 400; 3.5; 5 [USP'U]/G; MG/G; [USP'U]/G
OINTMENT TOPICAL ONCE
Status: CANCELLED | OUTPATIENT
Start: 2022-01-24 | End: 2022-01-24

## 2022-01-24 RX ORDER — GINSENG 100 MG
CAPSULE ORAL ONCE
Status: CANCELLED | OUTPATIENT
Start: 2022-01-24 | End: 2022-01-24

## 2022-01-24 RX ORDER — BETAMETHASONE DIPROPIONATE 0.05 %
OINTMENT (GRAM) TOPICAL ONCE
Status: CANCELLED | OUTPATIENT
Start: 2022-01-24 | End: 2022-01-24

## 2022-01-24 RX ORDER — LIDOCAINE HYDROCHLORIDE 20 MG/ML
JELLY TOPICAL ONCE
Status: CANCELLED | OUTPATIENT
Start: 2022-01-24 | End: 2022-01-24

## 2022-01-24 RX ORDER — BACITRACIN ZINC AND POLYMYXIN B SULFATE 500; 1000 [USP'U]/G; [USP'U]/G
OINTMENT TOPICAL ONCE
Status: CANCELLED | OUTPATIENT
Start: 2022-01-24 | End: 2022-01-24

## 2022-01-24 RX ORDER — BACITRACIN ZINC AND POLYMYXIN B SULFATE 500; 1000 [USP'U]/G; [USP'U]/G
OINTMENT TOPICAL ONCE
OUTPATIENT
Start: 2022-01-24 | End: 2022-01-24

## 2022-01-24 RX ORDER — BACITRACIN, NEOMYCIN, POLYMYXIN B 400; 3.5; 5 [USP'U]/G; MG/G; [USP'U]/G
OINTMENT TOPICAL ONCE
OUTPATIENT
Start: 2022-01-24 | End: 2022-01-24

## 2022-01-24 RX ORDER — LIDOCAINE 50 MG/G
OINTMENT TOPICAL ONCE
OUTPATIENT
Start: 2022-01-24 | End: 2022-01-24

## 2022-01-24 RX ORDER — LIDOCAINE HYDROCHLORIDE 40 MG/ML
SOLUTION TOPICAL ONCE
OUTPATIENT
Start: 2022-01-24 | End: 2022-01-24

## 2022-01-24 RX ORDER — LIDOCAINE HYDROCHLORIDE 20 MG/ML
JELLY TOPICAL ONCE
Status: COMPLETED | OUTPATIENT
Start: 2022-01-24 | End: 2022-01-24

## 2022-01-24 RX ORDER — GENTAMICIN SULFATE 1 MG/G
OINTMENT TOPICAL ONCE
OUTPATIENT
Start: 2022-01-24 | End: 2022-01-24

## 2022-01-24 RX ORDER — LIDOCAINE 40 MG/G
CREAM TOPICAL ONCE
OUTPATIENT
Start: 2022-01-24 | End: 2022-01-24

## 2022-01-24 RX ORDER — LIDOCAINE HYDROCHLORIDE 40 MG/ML
SOLUTION TOPICAL ONCE
Status: CANCELLED | OUTPATIENT
Start: 2022-01-24 | End: 2022-01-24

## 2022-01-24 RX ORDER — BETAMETHASONE DIPROPIONATE 0.05 %
OINTMENT (GRAM) TOPICAL ONCE
OUTPATIENT
Start: 2022-01-24 | End: 2022-01-24

## 2022-01-24 RX ORDER — GENTAMICIN SULFATE 1 MG/G
OINTMENT TOPICAL ONCE
Status: CANCELLED | OUTPATIENT
Start: 2022-01-24 | End: 2022-01-24

## 2022-01-24 RX ORDER — LIDOCAINE HYDROCHLORIDE 20 MG/ML
JELLY TOPICAL ONCE
OUTPATIENT
Start: 2022-01-24 | End: 2022-01-24

## 2022-01-24 RX ORDER — LIDOCAINE 40 MG/G
CREAM TOPICAL ONCE
Status: CANCELLED | OUTPATIENT
Start: 2022-01-24 | End: 2022-01-24

## 2022-01-24 RX ADMIN — LIDOCAINE HYDROCHLORIDE 6 ML: 20 JELLY TOPICAL at 14:28

## 2022-01-24 NOTE — PROGRESS NOTES
[address]   Progress Note and Procedure Note      [address] [facility] RECORD NUMBER:  [id number]  AGE: 64 y.o. GENDER: female  : 1960  EPISODE DATE:  2022    Subjective:     Chief Complaint   Patient presents with    Wound Check     right lower leg         HISTORY of PRESENT ILLNESS HPI     Skylar Anders is a 64 y.o. female who presents today for wound/ulcer evaluation. History of Wound Context: The patient is here for follow-up on the right anterior leg wound for 3 weeks after she tripped and had injury. The patient had necrotic scab, denied significant pain, denied fever or chills, no other complaints. Wound/Ulcer Pain Timing/Severity: intermittent  Quality of pain: dull  Severity:  1 / 10   Modifying Factors: None  Associated Signs/Symptoms: edema    Ulcer Identification:  Ulcer Type: traumatic    Contributing Factors: diabetes and obesity    History of CVA with residual right leg weakness  She does have chronic shortness of breath on exertion, no significant cough. She received COVID-19 vaccine.   PAST MEDICAL HISTORY        Diagnosis Date    GERD (gastroesophageal reflux disease)     Heart murmur     Herpes infection     HSIL on Pap smear of cervix     Hyperlipidemia     Hypertension     Obesity 2014    Osteoarthritis     Peripheral neuropathy 2014    Renal insufficiency 2017    Type II or unspecified type diabetes mellitus without mention of complication, not stated as uncontrolled        PAST SURGICAL HISTORY    Past Surgical History:   Procedure Laterality Date    HYSTERECTOMY, TOTAL ABDOMINAL  2003    With right oophorectomy       FAMILY HISTORY    Family History   Problem Relation Age of Onset    Heart Disease Mother     Diabetes Mother     Cancer Mother         breast    Cancer Father         colon    Heart Disease Father     High Blood Pressure Father        SOCIAL HISTORY    Social History     Tobacco Use    Smoking status: Former Smoker     Packs/day: 0.50     Start date: 1973     Quit date: 2000     Years since quittin.1    Smokeless tobacco: Never Used   Vaping Use    Vaping Use: Former   Substance Use Topics    Alcohol use: No    Drug use: No       ALLERGIES    No Known Allergies    MEDICATIONS    Current Outpatient Medications on File Prior to Encounter   Medication Sig Dispense Refill    fenofibrate micronized (LOFIBRA) 67 MG capsule TAKE 1 CAPSULE BY MOUTH EVERY DAY IN THE MORNING BEFORE BREAKFAST 90 capsule 1    NIFEdipine (ADALAT CC) 90 MG extended release tablet Take 90 mg by mouth daily      famotidine (PEPCID) 20 MG tablet TAKE 1 TABLET BY MOUTH EVERY DAY 90 tablet 1    atorvastatin (LIPITOR) 80 MG tablet Take 1 tablet by mouth daily 90 tablet 1    cloNIDine (CATAPRES) 0.3 MG tablet Take 1 tablet by mouth 3 times daily 90 tablet 0    furosemide (LASIX) 80 MG tablet Take 1 tablet by mouth See Admin Instructions 80 mg in AM, 40 mg in PM 60 tablet 0    amLODIPine (NORVASC) 5 MG tablet Take 1 tablet by mouth daily (Patient taking differently: Take 10 mg by mouth daily ) 90 tablet 0    lisinopril (PRINIVIL;ZESTRIL) 40 MG tablet Take 40 mg by mouth daily      insulin regular human (HUMULIN R U-500 KWIKPEN) 500 UNIT/ML SOPN concentrated injection pen Inject 110 Units into the skin every morning (before breakfast)      insulin regular human (HUMULIN R U-500 KWIKPEN) 500 UNIT/ML SOPN concentrated injection pen Inject 25 Units into the skin daily (before lunch)      insulin regular human (HUMULIN R U-500 KWIKPEN) 500 UNIT/ML SOPN concentrated injection pen Inject 90 Units into the skin Daily with supper      Cholecalciferol (VITAMIN D3) 50 MCG (2000) CAPS TAKE 1 CAPSULE BY MOUTH EVERY DAY      acetaminophen (TYLENOL 8 HOUR) 650 MG extended release tablet Take 1 tablet by mouth 2 times daily as needed for Pain 24 tablet 0    Handicap Placard MISC by Does not apply route 1 each 0    Multiple Vitamins-Minerals (THERAGRAN-M ADVANCED 50 PLUS) TABS Take 1 tablet by mouth daily 30 tablet 3    docusate sodium (COLACE) 100 MG capsule Take 100 mg by mouth 2 times daily      omega-3 acid ethyl esters (LOVAZA) 1 g capsule Take 2 g by mouth 2 times daily      lidocaine (LIDODERM) 5 % Place 1 patch onto the skin daily 12 hours on, 12 hours off.  Compression Stockings MISC Provide compressions stocking covered by insurance, 20 -30 2 each 0    Misc. Devices (COMMODE BEDSIDE) [place] Provide bedside commode covered by insurance 1 each 0    aspirin 81 MG chewable tablet Take 81 mg by mouth daily      REPATHA SURECLICK 794 MG/ML SOAJ       ARTIFICIAL TEARS 0.2-0.2-1 % SOLN       Ascorbic Acid (VITAMIN C) 1000 MG tablet Take 1,000 mg by mouth daily      latanoprost (XALATAN) 0.005 % ophthalmic solution Place 1 drop into the right eye daily       Misc. Devices (TRANSFER BENCH) MISC Use shower transfer bench with back,  every time patient gets in/or out of the shower. Please dispense insurance approved shower transfer bench. 1 each 0    carvedilol (COREG) 6.25 MG tablet Take 12.5 mg by mouth 2 times daily (with meals)       ONETOUCH DELICA LANCETS 81W MISC       ONE TOUCH ULTRA TEST strip       Blood Pressure Monitoring (B-D ASSURE BPM/AUTO WRIST CUFF) MISC As directed. Labile hypertension. 1 each 0    B-D ULTRAFINE III SHORT PEN 31G X 8 MM MISC        No current facility-administered medications on file prior to encounter. REVIEW OF SYSTEMS  Review of Systems    Pertinent items are noted in HPI.     Objective:      LMP  (LMP Unknown)     Wt Readings from Last 3 Encounters:   01/17/22 277 lb (125.6 kg)   07/12/21 274 lb 3.2 oz (124.4 kg)   02/10/21 275 lb 6.4 oz (124.9 kg)       PHYSICAL EXAM  Physical Exam    General Appearance: alert and oriented to person, place and time, well developed and well- nourished, in no acute distress  Skin: warm and dry  Head: normocephalic and atraumatic  Eyes: pupils equal, round  Neck: supple   Pulmonary/Chest: clear to auscultation bilaterally- no wheezes, rales or rhonchi, normal air movement, no respiratory distress  Cardiovascular: normal rate, regular rhythm, normal S1 and S2, no murmurs, rubs, clicks, or gallops, distal pulses intact, no carotid bruits  Abdomen: soft, non-tender, non-distended, normal bowel sounds, no masses or organomegaly  Extremities: no cyanosis, clubbing, mild bilateral lower extremity edema  Musculoskeletal: normal range of motion, no joint swelling, deformity or tenderness  Neurologic:speech normal      Assessment:        Problem List Items Addressed This Visit     Leg wound, right, initial encounter           Procedure Note  Indications:  Based on my examination of this patient's wound(s)/ulcer(s) today, debridement is required to promote healing and evaluate the wound base. Performed by: Maria Eugenia Desai MD    Consent obtained:  Yes    Time out taken:  Yes    Pain Control: Anesthetic  Anesthetic: 2% Lidocaine Gel Topical       Debridement: Excisional Debridement    Using curette the wound(s)/ulcer(s) was/were debrided down through and including the removal of subcutaneous tissue.         Devitalized Tissue Debrided:  necrotic/eschar    Pre Debridement Measurements:  Are located in the [place]  Documentation Flow Sheet    Diabetic/Pressure/Non Pressure Ulcers only:  Ulcer: N/A     Wound/Ulcer #: 1    Post Debridement Measurements:  Wound/Ulcer Descriptions are Pre Debridement except measurements:    Wound 01/24/22 Pretibial Right #1 (Active)   Wound Etiology Traumatic 01/24/22 1336   Wound Length (cm) 2 cm 01/24/22 1336   Wound Width (cm) 2.4 cm 01/24/22 1336   Wound Depth (cm) 0.1 cm 01/24/22 1336   Wound Surface Area (cm^2) 4.8 cm^2 01/24/22 1336   Wound Volume (cm^3) 0.48 cm^3 01/24/22 1336   Post-Procedure Length (cm) 2 cm 01/24/22 1336   Post-Procedure Width (cm) 2.4 cm 01/24/22 1336   Post-Procedure Depth (cm) 0.1 cm 01/24/22 1336   Post-Procedure Surface Area (cm^2) 4.8 cm^2 01/24/22 1336   Post-Procedure Volume (cm^3) 0.48 cm^3 01/24/22 1336   Wound Assessment Eschar dry 01/24/22 1336   Drainage Amount None 01/24/22 1336   Odor None 01/24/22 1336   Adriane-wound Assessment Dry/flaky; Intact 01/24/22 1336   Margins Defined edges 01/24/22 1336   Wound Thickness Description not for Pressure Injury Full thickness 01/24/22 1336   Number of days: 0          Total Surface Area Debrided:  4.8 sq cm     Estimated Blood Loss:  Minimal    Hemostasis Achieved:  not needed    Procedural Pain:  2  / 10     Post Procedural Pain:  2 / 10     Response to treatment:  Well tolerated by patient. Plan:     Treatment Note please see attached Discharge Instructions    Written patient dismissal instructions given to patient and signed by patient or POA. Discharge Instructions          [facility] -Phone: [phone] Fax: [phone]   Visit  Discharge Instructions / Physician Orders    DATE: 1/24/2022     Home Care:     SUPPLIES ORDERED THRU:     Wound Location:       Cleanse with: Liquid antibacterial soap and water, rinse well      Dressing Orders:        Frequency:       Additional Orders: Increase protein to diet (meat, cheese, eggs, fish, peanut butter, nuts and beans)  Multivitamin daily  ELEVATE LEGS AS MUCH AS POSSIBLE    Weekly wound care visits until determined otherwise. Antibiotic therapy-wound care related YES [] NO [] NA[]    Your next appointment with [address] [place] [address] is in 1 week           (Please note your next appointment above and if you are unable to keep, kindly give a 24 hour notice. Thank you.)     If you experience any of the following, please call the [address] [place] [address] during business hours:  [phone]  Your Phone call may be forwarded to [employer] during business hours that [phone] is closed.      * Increase in Pain  * Temperature over 101  * Increase in drainage from

## 2022-01-31 ENCOUNTER — HOSPITAL ENCOUNTER (OUTPATIENT)
Dept: WOUND CARE | Age: 62
Discharge: HOME | End: 2022-01-31
Payer: MEDICARE

## 2022-01-31 VITALS
TEMPERATURE: 98.2 F | DIASTOLIC BLOOD PRESSURE: 62 MMHG | HEART RATE: 75 BPM | SYSTOLIC BLOOD PRESSURE: 144 MMHG | WEIGHT: 277 LBS | RESPIRATION RATE: 18 BRPM | BODY MASS INDEX: 46.1 KG/M2

## 2022-01-31 DIAGNOSIS — S81.801A LEG WOUND, RIGHT, INITIAL ENCOUNTER: Primary | ICD-10-CM

## 2022-01-31 PROCEDURE — 11042 DBRDMT SUBQ TIS 1ST 20SQCM/<: CPT

## 2022-01-31 PROCEDURE — 11042 DBRDMT SUBQ TIS 1ST 20SQCM/<: CPT | Performed by: INTERNAL MEDICINE

## 2022-01-31 PROCEDURE — 99213 OFFICE O/P EST LOW 20 MIN: CPT

## 2022-01-31 RX ORDER — LIDOCAINE HYDROCHLORIDE 40 MG/ML
SOLUTION TOPICAL ONCE
OUTPATIENT
Start: 2022-01-31 | End: 2022-01-31

## 2022-01-31 RX ORDER — LIDOCAINE HYDROCHLORIDE 20 MG/ML
JELLY TOPICAL ONCE
OUTPATIENT
Start: 2022-01-31 | End: 2022-01-31

## 2022-01-31 RX ORDER — GENTAMICIN SULFATE 1 MG/G
OINTMENT TOPICAL ONCE
OUTPATIENT
Start: 2022-01-31 | End: 2022-01-31

## 2022-01-31 RX ORDER — LIDOCAINE 40 MG/G
CREAM TOPICAL ONCE
OUTPATIENT
Start: 2022-01-31 | End: 2022-01-31

## 2022-01-31 RX ORDER — CLOBETASOL PROPIONATE 0.5 MG/G
OINTMENT TOPICAL ONCE
OUTPATIENT
Start: 2022-01-31 | End: 2022-01-31

## 2022-01-31 RX ORDER — BACITRACIN, NEOMYCIN, POLYMYXIN B 400; 3.5; 5 [USP'U]/G; MG/G; [USP'U]/G
OINTMENT TOPICAL ONCE
OUTPATIENT
Start: 2022-01-31 | End: 2022-01-31

## 2022-01-31 RX ORDER — BETAMETHASONE DIPROPIONATE 0.05 %
OINTMENT (GRAM) TOPICAL ONCE
OUTPATIENT
Start: 2022-01-31 | End: 2022-01-31

## 2022-01-31 RX ORDER — LIDOCAINE HYDROCHLORIDE 20 MG/ML
JELLY TOPICAL ONCE
Status: COMPLETED | OUTPATIENT
Start: 2022-01-31 | End: 2022-01-31

## 2022-01-31 RX ORDER — GINSENG 100 MG
CAPSULE ORAL ONCE
OUTPATIENT
Start: 2022-01-31 | End: 2022-01-31

## 2022-01-31 RX ORDER — LIDOCAINE 50 MG/G
OINTMENT TOPICAL ONCE
OUTPATIENT
Start: 2022-01-31 | End: 2022-01-31

## 2022-01-31 RX ORDER — BACITRACIN ZINC AND POLYMYXIN B SULFATE 500; 1000 [USP'U]/G; [USP'U]/G
OINTMENT TOPICAL ONCE
OUTPATIENT
Start: 2022-01-31 | End: 2022-01-31

## 2022-01-31 RX ADMIN — LIDOCAINE HYDROCHLORIDE 6 ML: 20 JELLY TOPICAL at 13:03

## 2022-01-31 NOTE — PROGRESS NOTES
[address]   Progress Note and Procedure Note      [address] [facility] RECORD NUMBER:  [id number]  AGE: 64 y.o. GENDER: female  : 1960  EPISODE DATE:  2022    Subjective:     Chief Complaint   Patient presents with    Wound Check     right leg         HISTORY of PRESENT ILLNESS MILLICENT Kaye is a 64 y.o. female who presents today for wound/ulcer evaluation. History of Wound Context: The patient is here for follow-up on the right anterior leg wound for 4 weeks after she tripped and had injury. The patient had necrotic scab that was partially removed by Santyl, denied fever, does have chronic shortness of breath on exertion on Lasix, denied no significant cough, no other complaints  Wound/Ulcer Pain Timing/Severity: intermittent  Quality of pain: dull  Severity:  1 / 10   Modifying Factors: None  Associated Signs/Symptoms: edema    Ulcer Identification:  Ulcer Type: traumatic    Contributing Factors: diabetes and obesity    History of CVA with residual right leg weakness  She received COVID-19 vaccine.   PAST MEDICAL HISTORY        Diagnosis Date    GERD (gastroesophageal reflux disease)     Heart murmur     Herpes infection     HSIL on Pap smear of cervix     Hyperlipidemia     Hypertension     Obesity 2014    Osteoarthritis     Peripheral neuropathy 2014    Renal insufficiency 2017    Type II or unspecified type diabetes mellitus without mention of complication, not stated as uncontrolled        PAST SURGICAL HISTORY    Past Surgical History:   Procedure Laterality Date    HYSTERECTOMY, TOTAL ABDOMINAL  2003    With right oophorectomy       FAMILY HISTORY    Family History   Problem Relation Age of Onset    Heart Disease Mother     Diabetes Mother     Cancer Mother         breast    Cancer Father         colon    Heart Disease Father     High Blood Pressure Father        SOCIAL HISTORY    Social History Tobacco Use    Smoking status: Former Smoker     Packs/day: 0.50     Start date: 1973     Quit date: 2000     Years since quittin.2    Smokeless tobacco: Never Used   Vaping Use    Vaping Use: Former   Substance Use Topics    Alcohol use: No    Drug use: No       ALLERGIES    No Known Allergies    MEDICATIONS    Current Outpatient Medications on File Prior to Encounter   Medication Sig Dispense Refill    collagenase (SANTYL) 250 UNIT/GM ointment Apply topically daily.  90 g 2    fenofibrate micronized (LOFIBRA) 67 MG capsule TAKE 1 CAPSULE BY MOUTH EVERY DAY IN THE MORNING BEFORE BREAKFAST 90 capsule 1    NIFEdipine (ADALAT CC) 90 MG extended release tablet Take 90 mg by mouth daily      famotidine (PEPCID) 20 MG tablet TAKE 1 TABLET BY MOUTH EVERY DAY 90 tablet 1    atorvastatin (LIPITOR) 80 MG tablet Take 1 tablet by mouth daily 90 tablet 1    cloNIDine (CATAPRES) 0.3 MG tablet Take 1 tablet by mouth 3 times daily 90 tablet 0    furosemide (LASIX) 80 MG tablet Take 1 tablet by mouth See Admin Instructions 80 mg in AM, 40 mg in PM 60 tablet 0    amLODIPine (NORVASC) 5 MG tablet Take 1 tablet by mouth daily (Patient taking differently: Take 10 mg by mouth daily ) 90 tablet 0    lisinopril (PRINIVIL;ZESTRIL) 40 MG tablet Take 40 mg by mouth daily      insulin regular human (HUMULIN R U-500 KWIKPEN) 500 UNIT/ML SOPN concentrated injection pen Inject 110 Units into the skin every morning (before breakfast)      insulin regular human (HUMULIN R U-500 KWIKPEN) 500 UNIT/ML SOPN concentrated injection pen Inject 25 Units into the skin daily (before lunch)      insulin regular human (HUMULIN R U-500 KWIKPEN) 500 UNIT/ML SOPN concentrated injection pen Inject 90 Units into the skin Daily with supper      Cholecalciferol (VITAMIN D3) 50 MCG (2000) CAPS TAKE 1 CAPSULE BY MOUTH EVERY DAY      acetaminophen (TYLENOL 8 HOUR) 650 MG extended release tablet Take 1 tablet by mouth 2 times daily as needed for Pain 24 tablet 0    Handicap Placard MISC by Does not apply route 1 each 0    Multiple Vitamins-Minerals (THERAGRAN-M ADVANCED 50 PLUS) TABS Take 1 tablet by mouth daily 30 tablet 3    docusate sodium (COLACE) 100 MG capsule Take 100 mg by mouth 2 times daily      omega-3 acid ethyl esters (LOVAZA) 1 g capsule Take 2 g by mouth 2 times daily      lidocaine (LIDODERM) 5 % Place 1 patch onto the skin daily 12 hours on, 12 hours off.  Compression Stockings MISC Provide compressions stocking covered by insurance, 20 -30 2 each 0    Misc. Devices (COMMODE BEDSIDE) [facility] Provide bedside commode covered by insurance 1 each 0    aspirin 81 MG chewable tablet Take 81 mg by mouth daily      REPATHA SURECLICK 557 MG/ML SOAJ       ARTIFICIAL TEARS 0.2-0.2-1 % SOLN       Ascorbic Acid (VITAMIN C) 1000 MG tablet Take 1,000 mg by mouth daily      latanoprost (XALATAN) 0.005 % ophthalmic solution Place 1 drop into the right eye daily       Misc. Devices (TRANSFER BENCH) MISC Use shower transfer bench with back,  every time patient gets in/or out of the shower. Please dispense insurance approved shower transfer bench. 1 each 0    carvedilol (COREG) 6.25 MG tablet Take 12.5 mg by mouth 2 times daily (with meals)       ONETOUCH DELICA LANCETS 58B MISC       ONE TOUCH ULTRA TEST strip       Blood Pressure Monitoring (B-D ASSURE BPM/AUTO WRIST CUFF) MISC As directed. Labile hypertension. 1 each 0    B-D ULTRAFINE III SHORT PEN 31G X 8 MM MISC        No current facility-administered medications on file prior to encounter. REVIEW OF SYSTEMS  Review of Systems    Pertinent items are noted in HPI.     Objective:      BP (!) 144/62   Pulse 75   Temp 98.2 °F (36.8 °C) (Tympanic)   Resp 18   Wt 277 lb (125.6 kg)   LMP  (LMP Unknown)   BMI 46.10 kg/m²     Wt Readings from Last 3 Encounters:   01/31/22 277 lb (125.6 kg)   01/17/22 277 lb (125.6 kg)   07/12/21 274 lb 3.2 oz (124.4 kg) PHYSICAL EXAM  Physical Exam    General Appearance: alert and oriented to person, place and time, well developed and well- nourished, in no acute distress  Skin: warm and dry  Head: normocephalic and atraumatic  Eyes: pupils equal, round  Neck: supple   Pulmonary/Chest:no respiratory distress    Abdomen: soft, non-tender, non-distended, normal bowel sounds, no masses or organomegaly  Extremities: no cyanosis, clubbing, mild bilateral lower extremity edema  Musculoskeletal: normal range of motion, no joint swelling, deformity or tenderness  Neurologic:speech normal      Assessment:        Problem List Items Addressed This Visit     Leg wound, right, initial encounter - Primary    Relevant Orders    Initiate Outpatient Wound Care Protocol           Procedure Note  Indications:  Based on my examination of this patient's wound(s)/ulcer(s) today, debridement is required to promote healing and evaluate the wound base. Performed by: Jauna Mensah MD    Consent obtained:  Yes    Time out taken:  Yes    Pain Control: Anesthetic  Anesthetic: 2% Lidocaine Gel Topical       Debridement: Excisional Debridement    Using curette the wound(s)/ulcer(s) was/were debrided down through and including the removal of subcutaneous tissue.         Devitalized Tissue Debrided:  necrotic/eschar    Pre Debridement Measurements:  Are located in the [place]  Documentation Flow Sheet    Diabetic/Pressure/Non Pressure Ulcers only:  Ulcer: N/A     Wound/Ulcer #: 1    Post Debridement Measurements:  Wound/Ulcer Descriptions are Pre Debridement except measurements:    Wound 01/24/22 Pretibial Right #1 (Active)   Wound Etiology Traumatic 01/31/22 1256   Wound Cleansed Cleansed with saline 01/31/22 1256   Wound Length (cm) 2.4 cm 01/31/22 1256   Wound Width (cm) 1.8 cm 01/31/22 1256   Wound Depth (cm) 0.1 cm 01/31/22 1256   Wound Surface Area (cm^2) 4.32 cm^2 01/31/22 1256   Change in Wound Size % (l*w) 10 01/31/22 1256   Wound Volume (cm^3) 0.432 cm^3 01/31/22 1256   Wound Healing % 10 01/31/22 1256   Post-Procedure Length (cm) 2.4 cm 01/31/22 1256   Post-Procedure Width (cm) 1.8 cm 01/31/22 1256   Post-Procedure Depth (cm) 0.1 cm 01/31/22 1256   Post-Procedure Surface Area (cm^2) 4.32 cm^2 01/31/22 1256   Post-Procedure Volume (cm^3) 0.432 cm^3 01/31/22 1256   Wound Assessment Pink/red;Eschar moist 01/31/22 1256   Drainage Amount None 01/31/22 1256   Odor None 01/31/22 1256   Adriane-wound Assessment Dry/flaky; Intact 01/31/22 1256   Margins Defined edges 01/31/22 1256   Wound Thickness Description not for Pressure Injury Full thickness 01/31/22 1256   Number of days: 6          Total Surface Area Debrided:  4.3 sq cm     Estimated Blood Loss:  Minimal    Hemostasis Achieved:  not needed    Procedural Pain:  2  / 10     Post Procedural Pain:  2 / 10     Response to treatment:  Well tolerated by patient. Plan:     Treatment Note please see attached Discharge Instructions    Written patient dismissal instructions given to patient and signed by patient or POA. Discharge Instructions          [facility] -Phone: [phone] Fax: [phone]             Visit  Discharge Instructions / Physician Orders     DATE: 1/31/2022     Home Care: N/A     SUPPLIES ORDERED THRU: N/A     Wound Location:  Right Lower Leg     Cleanse with: Liquid antibacterial soap and water, rinse well, pat dry      Dressing Orders: Keep area moisturized with NON FRAGRANT moisturizer      Frequency:  DAILY     Additional Orders: Increase protein to diet (meat, cheese, eggs, fish, peanut butter, nuts and beans)  Multivitamin daily  ELEVATE LEGS AS MUCH AS POSSIBLE     Your next appointment with [address] [place] [address] is as needed        (Please note your next appointment above and if you are unable to keep, kindly give a 24 hour notice.  Thank you.)     If you experience any of the following, please call the [address] [place] [address] [facility] hours:  [phone]  Your Phone call may be forwarded to [address] during business hours that [place] is closed.     * Increase in Pain  * Temperature over 101  * Increase in drainage from your wound  * Drainage with a foul odor  * Bleeding  * Increase in swelling  * Need for compression bandage changes due to slippage, breakthrough drainage.     If you need medical attention outside of the business hours of the [address] [place] [address] Road please contact your PCP or go to the nearest emergency room. The information contained in the After Visit Summary has been reviewed with me, the patient and/or responsible adult, by my health care provider(s). I had the opportunity to ask questions regarding this information. I have elected to receive;      []? After Visit Summary  [x]? Comprehensive Discharge Instruction        Patient signature______________________________________Date:________   Electronically signed by Praveen Warren RN on 1/31/2022 at 1:25 PM         Electronically signed by Facundo Manning MD on 1/31/2022 at 1:27 PM

## 2022-02-03 ENCOUNTER — CARE COORDINATION (OUTPATIENT)
Dept: CARE COORDINATION | Age: 62
End: 2022-02-03

## 2022-02-03 NOTE — CARE COORDINATION
Ambulatory Care Coordination Note  2/3/2022  CM Risk Score: 5  Charlson 10 Year Mortality Risk Score: 100%     ACC: Janett Dimas    Summary Note: Mingo Avila reports she received the list of dentists and ophthalmologists. She reports her adult sister has been using the list and making calls. She states a dentist told the sitter he needs a letter from Dre's PCP stating it is OK to treat her. She reports her sitter works at [employer]. She states she should be there by 5. She also states her sitter is off work with [employer] on Tuesdays. She states she gets to her house at 9 AM on Tuesdays and is there all day. Plan:  Call Mingo Avila at 5 PM today. Mingo Avila reports her sitter is not at her house as yet. CM and Mingo Avila agreed CM will call Tuesday. Care Coordination Interventions    Program Enrollment: Complex Care  Referral from Primary Care Provider: No  Suggested Interventions and Community Resources  Diabetes Education: Completed  Zone Management Tools: Completed  Other Services or Interventions: An adult sitter stays with Mingo Avila while her brother is at work         Goals Addressed    None         Prior to Admission medications    Medication Sig Start Date End Date Taking? Authorizing Provider   collagenase (SANTYL) 250 UNIT/GM ointment Apply topically daily.  1/24/22 2/3/22  Daya Rangel MD   fenofibrate micronized (LOFIBRA) 67 MG capsule TAKE 1 CAPSULE BY MOUTH EVERY DAY IN THE MORNING BEFORE BREAKFAST 1/3/22   KLARISSA Altman CNP   NIFEdipine (ADALAT CC) 90 MG extended release tablet Take 90 mg by mouth daily    Historical Provider, MD   famotidine (PEPCID) 20 MG tablet TAKE 1 TABLET BY MOUTH EVERY DAY 12/2/21   KLARISSA Altman CNP   atorvastatin (LIPITOR) 80 MG tablet Take 1 tablet by mouth daily 10/12/21   KLARISSA Altman CNP   cloNIDine (CATAPRES) 0.3 MG tablet Take 1 tablet by mouth 3 times daily 7/12/21 1/17/22  KLARISSA Altman CNP   furosemide (LASIX) 80 MG tablet Take 1 tablet by mouth See Admin Instructions 80 mg in AM, 40 mg in PM 7/12/21   KLARISSA Mccord CNP   amLODIPine (NORVASC) 5 MG tablet Take 1 tablet by mouth daily  Patient taking differently: Take 10 mg by mouth daily  6/30/21   KLARISSA Mccord CNP   lisinopril (PRINIVIL;ZESTRIL) 40 MG tablet Take 40 mg by mouth daily    Historical Provider, MD   insulin regular human (HUMULIN R U-500 KWIKPEN) 500 UNIT/ML SOPN concentrated injection pen Inject 110 Units into the skin every morning (before breakfast)    Historical Provider, MD   insulin regular human (HUMULIN R U-500 KWIKPEN) 500 UNIT/ML SOPN concentrated injection pen Inject 25 Units into the skin daily (before lunch)    Historical Provider, MD   insulin regular human (HUMULIN R U-500 KWIKPEN) 500 UNIT/ML SOPN concentrated injection pen Inject 90 Units into the skin Daily with supper    Historical Provider, MD   Cholecalciferol (VITAMIN D3) 50 MCG (2000 UT) CAPS TAKE 1 CAPSULE BY MOUTH EVERY DAY 11/24/20   Historical Provider, MD   acetaminophen (TYLENOL 8 HOUR) 650 MG extended release tablet Take 1 tablet by mouth 2 times daily as needed for Pain 2/10/21 1/17/22  Rima Haskins PA-C   Handicap Placard MISC by Does not apply route 9/16/19   Rima Haskins PA-C   Multiple Vitamins-Minerals [facility] SYSTEM ADVANCED 50 PLUS) TABS Take 1 tablet by mouth daily 1/14/19   Rima Haskins PA-C   docusate sodium (COLACE) 100 MG capsule Take 100 mg by mouth 2 times daily    Historical Provider, MD   omega-3 acid ethyl esters (LOVAZA) 1 g capsule Take 2 g by mouth 2 times daily    Historical Provider, MD   lidocaine (LIDODERM) 5 % Place 1 patch onto the skin daily 12 hours on, 12 hours off. Historical Provider, MD   Compression Stockings MISC Provide compressions stocking covered by insurance, 20 -30 6/4/18   CHARU Harrell.  Devices (COMMODE BEDSIDE) [facility] Provide bedside commode covered by insurance 3/21/18   Rima Haskins PA-C aspirin 81 MG chewable tablet Take 81 mg by mouth daily 2/9/18   Historical Provider, MD Jose Graf 140 MG/ML SOAJ  1/15/18   Historical Provider, MD   ARTIFICIAL TEARS 0.2-0.2-1 % SOLN  2/9/18   Historical Provider, MD   Ascorbic Acid (VITAMIN C) 1000 MG tablet Take 1,000 mg by mouth daily 2/9/18   Historical Provider, MD   latanoprost (XALATAN) 0.005 % ophthalmic solution Place 1 drop into the right eye daily  12/4/17   Historical Provider, MD   Misc. Devices (TRANSFER BENCH) MISC Use shower transfer bench with back,  every time patient gets in/or out of the shower. Please dispense insurance approved shower transfer bench. 8/15/17   Emil Brooks MD   carvedilol (COREG) 6.25 MG tablet Take 12.5 mg by mouth 2 times daily (with meals)     Historical Provider, MD Nichols Merle LANCETS 25P [address] [facility]  10/5/16   Historical Provider, MD   ONE TOUCH ULTRA TEST strip  1/24/16   Historical Provider, MD   Blood Pressure Monitoring (B-D ASSURE BPM/AUTO WRIST CUFF) MISC As directed. Labile hypertension.  1/25/16   Emil Brooks MD   B-D ULTRAFINE III SHORT PEN 31G X 8 MM MISC  3/15/14   Historical Provider, MD       Future Appointments   Date Time Provider Jack Shah   5/16/2022 10:00 AM Nan Marquez, APRN - [address] [facility]

## 2022-02-08 ENCOUNTER — CARE COORDINATION (OUTPATIENT)
Dept: CARE COORDINATION | Age: 62
End: 2022-02-08

## 2022-02-08 NOTE — CARE COORDINATION
VM left requesting a return call to [phone]. CM is interested in speaking with Crystal's adult sitter to assist in getting a dental and vision appt. Plan:  Attempt to connect with the sitter next Tuesday if no return call today.

## 2022-02-17 ENCOUNTER — CARE COORDINATION (OUTPATIENT)
Dept: CARE COORDINATION | Age: 62
End: 2022-02-17

## 2022-02-17 NOTE — CARE COORDINATION
Nutrition Care Coordinator Follow-Up visit:    Food Recall: eating 2-3 meals/d    Activity Level:  Sedentary:  Lightly Active: X  Moderately Active:  Very Active:    Adult BMI:  Underweight (below 18.5)  Normal Weight (18.5-24.9)  Overweight (25-29. 9)  Obese (30-39. 9)  Morbidly Obese (>40) X    Weight Change: no change    Plan:  Plan was established with patient:  Increase dietary fiber by consuming whole grains, fruits and vegetables: X  Limit dietary cholesterol to >200mg/day: Increase water intake:  Avoid added sugar: X  Avoid sweetened beverages: X  Choose lean meats:      Monitoring: Will monitor weight:  Will monitor adherence to meal plan: Will monitor adherence to exercise plan: Will monitor HGA1c: X    Handouts Provided :  Low Carb snacking: X  Carb counting /individual meal plan:  Portion Control: X  Food Labels:  Physical Activity:  Low Fat/Cholesterol:  Hypo/Hyperglycemia:  Calorie Controlled Meal Plan:    Goals: Increase water consumption to 8oz. 6-8 times daily:  Manage blood sugars by consuming 3 meals spaced every 4-5 hours with 2-3 snacks daily: reviewed  Increase fiber and decrease fat intake by consuming 1-2 fruit servings and 2-3 vegetable servings per day. Increase physical activity by:  Consume less than 2,000mg of sodium/day  Avoid consumption of sweetened beverages and added sugar by reading food labels:reviewed  Monitor blood sugars by using meter to check blood glucose before morning meal and 2 hours after a meal daily:BS improving per patient 100-180 recent readings and patient states wound healed  Decrease risk of coronary heart disease by consuming fish that contains omega-3 fatty acids at least twice a week, avoiding partially hydrogenated oil/trans fats and limiting saturated fat intake by reading food labels: reviewed    Patient goals set:  1.  Reviewed what a serving size is of carbs and how to measure servings out to limit carbohydrates at meals and snacks.  Goal is 45-60gms of carb/meal, 15-30gms/snack. 2. Patient will eat 3 meals daily spaced every 4-5 hours. Reviewed quick/easy lunch and breakfast suggestions.  Reviewed using Glucerna- when going too long between meals of skipping meals, patient states she is doing this. 3. Reviewed working on increasing non-starchy vegetables and lean protein sources. Focused on increasing protein sources due to leg wound- lean meats, eggs, cottage cheese, nut/seeds, ect.  Reviewed what non-starchy vegetables are and encouraged patient to make 1/2  plate non-starchy vegetables, 1/4 lean protein, 1/4 carbs at meals. Foods from each category reviewed along with serving size.    4. Reviewed avoiding sweets. Patient denies drinking sugary drinks.  Reviewed alternatives to sweets- sugar free pudding, jello,  No added sugar ice cream and limiting amount to 1/2 cup. Encouraged patient to keep sweets out of the house as much as possible and limit to once a week or less.   Patient states sugars are improving and wound is healed. She is using Glucerna- will send her more coupons. Reviewed portion control and using plate method. Will follow up in 3-4 weeks to review and answer questions.       Sebastian Baca

## 2022-02-23 ENCOUNTER — CARE COORDINATION (OUTPATIENT)
Dept: CARE COORDINATION | Age: 62
End: 2022-02-23

## 2022-03-04 ENCOUNTER — CARE COORDINATION (OUTPATIENT)
Dept: CARE COORDINATION | Age: 62
End: 2022-03-04

## 2022-03-08 ENCOUNTER — CARE COORDINATION (OUTPATIENT)
Dept: CARE COORDINATION | Age: 62
End: 2022-03-08

## 2022-03-08 NOTE — CARE COORDINATION
1/14/19   Samanta Combs PA-C   docusate sodium (COLACE) 100 MG capsule Take 100 mg by mouth 2 times daily    Historical Provider, MD   omega-3 acid ethyl esters (LOVAZA) 1 g capsule Take 2 g by mouth 2 times daily    Historical Provider, MD   lidocaine (LIDODERM) 5 % Place 1 patch onto the skin daily 12 hours on, 12 hours off. Historical Provider, MD   Compression Stockings MISC Provide compressions stocking covered by insurance, 20 -30 6/4/18   Samanta Combs PA-C   [facility] Devices (COMMODE BEDSIDE) [facility] Provide bedside commode covered by insurance 3/21/18   Samanta Combs PA-C   aspirin 81 MG chewable tablet Take 81 mg by mouth daily 2/9/18   Historical Provider, MD Araan Flair 413 MG/ML SOAJ  1/15/18   Historical Provider, MD   ARTIFICIAL TEARS 0.2-0.2-1 % SOLN  2/9/18   Historical Provider, MD   Ascorbic Acid (VITAMIN C) 1000 MG tablet Take 1,000 mg by mouth daily 2/9/18   Historical Provider, MD   latanoprost (XALATAN) 0.005 % ophthalmic solution Place 1 drop into the right eye daily  12/4/17   Historical Provider, MD   Misc. Devices (TRANSFER BENCH) MISC Use shower transfer bench with back,  every time patient gets in/or out of the shower. Please dispense insurance approved shower transfer bench. 8/15/17   Dann Zamudio MD   carvedilol (COREG) 6.25 MG tablet Take 12.5 mg by mouth 2 times daily (with meals)     Historical Provider, MD Alvarez Iron LANCETS 51Z [address] [facility]  10/5/16   Historical Provider, MD   ONE TOUCH ULTRA TEST strip  1/24/16   Historical Provider, MD   Blood Pressure Monitoring (B-D ASSURE BPM/AUTO WRIST CUFF) MISC As directed. Labile hypertension.  1/25/16   Dann Zamudio MD   B-D ULTRAFINE III SHORT PEN 31G X 8 MM MISC  3/15/14   Historical Provider, MD       Future Appointments   Date Time Provider Jack Shah   5/16/2022 10:00 AM Kadeem Medley, APRN - [address] [facility]

## 2022-03-09 ENCOUNTER — OFFICE VISIT (OUTPATIENT)
Dept: PRIMARY CARE CLINIC | Age: 62
End: 2022-03-09
Payer: MEDICARE

## 2022-03-09 VITALS
WEIGHT: 290.6 LBS | OXYGEN SATURATION: 99 % | HEIGHT: 66 IN | TEMPERATURE: 97.5 F | DIASTOLIC BLOOD PRESSURE: 74 MMHG | SYSTOLIC BLOOD PRESSURE: 160 MMHG | HEART RATE: 87 BPM | BODY MASS INDEX: 46.7 KG/M2

## 2022-03-09 DIAGNOSIS — E11.42 TYPE 2 DIABETES MELLITUS WITH DIABETIC POLYNEUROPATHY, WITH LONG-TERM CURRENT USE OF INSULIN (HCC): ICD-10-CM

## 2022-03-09 DIAGNOSIS — I10 ESSENTIAL HYPERTENSION: Primary | ICD-10-CM

## 2022-03-09 DIAGNOSIS — Z79.4 TYPE 2 DIABETES MELLITUS WITH DIABETIC POLYNEUROPATHY, WITH LONG-TERM CURRENT USE OF INSULIN (HCC): ICD-10-CM

## 2022-03-09 PROCEDURE — G8427 DOCREV CUR MEDS BY ELIG CLIN: HCPCS | Performed by: STUDENT IN AN ORGANIZED HEALTH CARE EDUCATION/TRAINING PROGRAM

## 2022-03-09 PROCEDURE — 3046F HEMOGLOBIN A1C LEVEL >9.0%: CPT | Performed by: STUDENT IN AN ORGANIZED HEALTH CARE EDUCATION/TRAINING PROGRAM

## 2022-03-09 PROCEDURE — 99214 OFFICE O/P EST MOD 30 MIN: CPT | Performed by: STUDENT IN AN ORGANIZED HEALTH CARE EDUCATION/TRAINING PROGRAM

## 2022-03-09 PROCEDURE — G8417 CALC BMI ABV UP PARAM F/U: HCPCS | Performed by: STUDENT IN AN ORGANIZED HEALTH CARE EDUCATION/TRAINING PROGRAM

## 2022-03-09 PROCEDURE — G8482 FLU IMMUNIZE ORDER/ADMIN: HCPCS | Performed by: STUDENT IN AN ORGANIZED HEALTH CARE EDUCATION/TRAINING PROGRAM

## 2022-03-09 PROCEDURE — 1036F TOBACCO NON-USER: CPT | Performed by: STUDENT IN AN ORGANIZED HEALTH CARE EDUCATION/TRAINING PROGRAM

## 2022-03-09 PROCEDURE — 3017F COLORECTAL CA SCREEN DOC REV: CPT | Performed by: STUDENT IN AN ORGANIZED HEALTH CARE EDUCATION/TRAINING PROGRAM

## 2022-03-09 PROCEDURE — 2022F DILAT RTA XM EVC RTNOPTHY: CPT | Performed by: STUDENT IN AN ORGANIZED HEALTH CARE EDUCATION/TRAINING PROGRAM

## 2022-03-09 RX ORDER — ACETAMINOPHEN 500 MG
500 TABLET ORAL EVERY 6 HOURS PRN
COMMUNITY

## 2022-03-09 RX ORDER — HYDRALAZINE HYDROCHLORIDE AND ISOSORBIDE DINITRATE 37.5; 2 MG/1; MG/1
1 TABLET, FILM COATED ORAL 3 TIMES DAILY
Qty: 180 TABLET | Refills: 3 | Status: SHIPPED | OUTPATIENT
Start: 2022-03-09 | End: 2022-08-10 | Stop reason: SDUPTHER

## 2022-03-09 RX ORDER — CALCIUM CARBONATE 1000 MG/1
1000 TABLET, CHEWABLE ORAL
COMMUNITY

## 2022-03-09 RX ORDER — EVOLOCUMAB 140 MG/ML
INJECTION, SOLUTION SUBCUTANEOUS
COMMUNITY
Start: 2022-02-01 | End: 2022-06-16

## 2022-03-09 NOTE — PROGRESS NOTES
Jonny Garcia (:  1960) is a 64 y.o. female,Established patient, here for evaluation of the following chief complaint(s):  Dental Pain and Knee Pain (Right knee swollen)         ASSESSMENT/PLAN:  1. Essential hypertension  -     isosorbide-hydrALAZINE (BIDIL) 20-37.5 MG per tablet; Take 1 tablet by mouth 3 times daily, Disp-180 tablet, R-3Normal  2. Type 2 diabetes mellitus with diabetic polyneuropathy, with long-term current use of insulin ([facility])  -     POCT glycosylated hemoglobin (Hb A1C); Future  3. Body mass index (BMI) 45.0-49.9, adult ([facility])    Presents today for dental visit  Needs preop clearance   Has high BP  Elevated today too  Will add bidil  Should help  Can have surgery no issues  For HPI see previous notes    She is an insulin dep diabetic who follows with endo and has >150 units daily insulin  Highly resistant, likely does not keep with dietary protocol    No follow-ups on file. Subjective   SUBJECTIVE/OBJECTIVE:  HPI: 64 F presents for pre-dental medical clearance bc her BP is not at goal    Review of Systems Pertinent positives and negatives included in HPI 14 point ROS otherwise negative         Objective   Physical Exam  Vitals and nursing note reviewed. Constitutional:       Appearance: Normal appearance. HENT:      Head: Normocephalic and atraumatic. Eyes:      Extraocular Movements: Extraocular movements intact. Cardiovascular:      Rate and Rhythm: Normal rate and regular rhythm. Pulses: Normal pulses. Heart sounds: Normal heart sounds. Pulmonary:      Effort: Pulmonary effort is normal.      Breath sounds: Normal breath sounds. Abdominal:      General: Abdomen is flat. Palpations: Abdomen is soft. Musculoskeletal:         General: Normal range of motion. Cervical back: Normal range of motion and neck supple. Skin:     General: Skin is warm. Neurological:      General: No focal deficit present.       Mental Status: She is alert and oriented to person, place, and time. An electronic signature was used to authenticate this note.     --Melissa Newsome MD

## 2022-03-09 NOTE — LETTER
[facility]  [address] [place] [id number]  Phone: [phone]  Fax: [phone]    Ellis Dye MD        March 9, 2022      To whom it may concern :          Jonny Garcia is to be  cleared for any and all dental procedures . If you have any questions or concerns, please don't hesitate to call.     Sincerely,        Ellis Dye MD

## 2022-03-10 ENCOUNTER — CARE COORDINATION (OUTPATIENT)
Dept: CARE COORDINATION | Age: 62
End: 2022-03-10

## 2022-03-10 NOTE — CARE COORDINATION
Ambulatory Care Coordination Note  3/10/2022  CM Risk Score: 5  Charlson 10 Year Mortality Risk Score: 100%     ACC: Bertrand Davis    Summary Note: Ernesto Jerez nephew called to report Yohana Castaneda did not get anything for her dental \"infection\". He reports they did receive a letter for the dentist. Lissett Vargas asked Emil Mayfield to [facility] [address] [facility] to get Yohana Castaneda an appointment. This is a dental facility that is on the list Yohana Castaneda was provided from her insurance company. Emil Mayfield expressed an understanding and is agreeable to the plan. He reports he will keep CM updated. Plan:  F/U with Yohana Castaneda next week if no return call. Care Coordination Interventions    Program Enrollment: Complex Care  Referral from Primary Care Provider: No  Suggested Interventions and Community Resources  Diabetes Education: Completed  Zone Management Tools: Completed  Other Services or Interventions: An adult sitter stays with Yohana Castaneda while her brother is at work         Goals Addressed    None         Prior to Admission medications    Medication Sig Start Date End Date Taking?  Authorizing Provider   Calcium Carbonate Antacid (TUMS ULTRA 1000) 1000 MG CHEW Take 1,000 mg by mouth    Historical Provider, MD   acetaminophen (TYLENOL) 500 MG tablet Take 500 mg by mouth every 6 hours as needed for Pain    Historical Provider, MD   REPATHA 140 MG/ML SOSY 1 ML EVERY 15 DAYS SUBCUTANEOUS 90 DAYS 2/1/22   Historical Provider, MD   isosorbide-hydrALAZINE (BIDIL) 20-37.5 MG per tablet Take 1 tablet by mouth 3 times daily 3/9/22   Excell MD Yao   fenofibrate micronized (LOFIBRA) 67 MG capsule TAKE 1 CAPSULE BY MOUTH EVERY DAY IN THE MORNING BEFORE BREAKFAST 1/3/22   KLARISSA Clarke - CNP   NIFEdipine (ADALAT CC) 90 MG extended release tablet Take 90 mg by mouth daily    Historical Provider, MD   famotidine (PEPCID) 20 MG tablet TAKE 1 TABLET BY MOUTH EVERY DAY 12/2/21   KLARISSA Clarke CNP   atorvastatin (LIPITOR) 80 MG tablet Take 1 tablet by mouth daily 10/12/21   Martine Angelucci, APRN - CNP   cloNIDine (CATAPRES) 0.3 MG tablet Take 1 tablet by mouth 3 times daily 7/12/21 3/9/23  Martine Angelucci, APRN - CNP   furosemide (LASIX) 80 MG tablet Take 1 tablet by mouth See Admin Instructions 80 mg in AM, 40 mg in PM 7/12/21   Martine Angelucci, APRN - CNP   amLODIPine (NORVASC) 5 MG tablet Take 1 tablet by mouth daily  Patient taking differently: Take 10 mg by mouth daily  6/30/21   Martine Angelucci, APRN - CNP   lisinopril (PRINIVIL;ZESTRIL) 40 MG tablet Take 40 mg by mouth daily    Historical Provider, MD   insulin regular human (HUMULIN R U-500 KWIKPEN) 500 UNIT/ML SOPN concentrated injection pen Inject 110 Units into the skin every morning (before breakfast)    Historical Provider, MD   insulin regular human (HUMULIN R U-500 KWIKPEN) 500 UNIT/ML SOPN concentrated injection pen Inject 25 Units into the skin daily (before lunch)    Historical Provider, MD   insulin regular human (HUMULIN R U-500 KWIKPEN) 500 UNIT/ML SOPN concentrated injection pen Inject 90 Units into the skin Daily with supper    Historical Provider, MD   Cholecalciferol (VITAMIN D3) 50 MCG (2000 UT) CAPS TAKE 1 CAPSULE BY MOUTH EVERY DAY 11/24/20   Historical Provider, MD   acetaminophen (TYLENOL 8 HOUR) 650 MG extended release tablet Take 1 tablet by mouth 2 times daily as needed for Pain 2/10/21 1/17/22  Kameron Webb PA-C   Handicap Placard MISC by Does not apply route 9/16/19   Kameron Webb PA-C   Multiple Vitamins-Minerals [facility] SYSTEM ADVANCED 50 PLUS) TABS Take 1 tablet by mouth daily 1/14/19   Kameron Webb PA-C   docusate sodium (COLACE) 100 MG capsule Take 100 mg by mouth 2 times daily    Historical Provider, MD   lidocaine (LIDODERM) 5 % Place 1 patch onto the skin daily 12 hours on, 12 hours off. Historical Provider, MD   Compression Stockings MISC Provide compressions stocking covered by insurance, 20 -30 6/4/18   Kameron Webb PA-C   [facility] ([facility]EDS[facility] Provide bedside commode covered by insurance 3/21/18   dAri Engel PA-C   aspirin 81 MG chewable tablet Take 81 mg by mouth daily 2/9/18   Historical Provider, MD   ARTIFICIAL TEARS 0.2-0.2-1 % SOLN  2/9/18   Historical Provider, MD   Ascorbic Acid (VITAMIN C) 1000 MG tablet Take 1,000 mg by mouth daily 2/9/18   Historical Provider, MD   latanoprost (XALATAN) 0.005 % ophthalmic solution Place 1 drop into the right eye daily  12/4/17   Historical Provider, MD   Misc. Devices (TRANSFER BENCH) MISC Use shower transfer bench with back,  every time patient gets in/or out of the shower. Please dispense insurance approved shower transfer bench. 8/15/17   Cammie Boyce MD   carvedilol (COREG) 6.25 MG tablet Take 12.5 mg by mouth 2 times daily (with meals)     Historical Provider, MD   [place] LANCETS [address] [facility]  10/5/16   Historical Provider, MD   ONE TOUCH ULTRA TEST strip  1/24/16   Historical Provider, MD   Blood Pressure Monitoring (B-D ASSURE BPM/AUTO WRIST CUFF) MISC As directed. Labile hypertension.  1/25/16   Cammie Boyce MD   B-D ULTRAFINE III SHORT PEN 31G X 8 MM MISC  3/15/14   Historical Provider, MD       Future Appointments   Date Time Provider Jack Shah   5/16/2022 10:00 AM Michele Morin, APRN - [address] [facility]

## 2022-03-22 ENCOUNTER — CARE COORDINATION (OUTPATIENT)
Dept: CARE COORDINATION | Age: 62
End: 2022-03-22

## 2022-03-22 NOTE — CARE COORDINATION
REPATHA 140 MG/ML SOSY 1 ML EVERY 15 DAYS SUBCUTANEOUS 90 DAYS 2/1/22   Historical Provider, MD   isosorbide-hydrALAZINE (BIDIL) 20-37.5 MG per tablet Take 1 tablet by mouth 3 times daily 3/9/22   Garcia Rod MD   fenofibrate micronized (LOFIBRA) 67 MG capsule TAKE 1 CAPSULE BY MOUTH EVERY DAY IN THE MORNING BEFORE BREAKFAST 1/3/22   Kandis Brunner, APRN - CNP   NIFEdipine (ADALAT CC) 90 MG extended release tablet Take 90 mg by mouth daily    Historical Provider, MD   famotidine (PEPCID) 20 MG tablet TAKE 1 TABLET BY MOUTH EVERY DAY 12/2/21   Kandis Brunner, APRN - CNP   atorvastatin (LIPITOR) 80 MG tablet Take 1 tablet by mouth daily 10/12/21   Kandis Brunner, APRN - CNP   cloNIDine (CATAPRES) 0.3 MG tablet Take 1 tablet by mouth 3 times daily 7/12/21 3/9/23  Kandis Brunner, APRN - CNP   furosemide (LASIX) 80 MG tablet Take 1 tablet by mouth See Admin Instructions 80 mg in AM, 40 mg in PM 7/12/21   Kandis Brunner, APRN - CNP   amLODIPine (NORVASC) 5 MG tablet Take 1 tablet by mouth daily  Patient taking differently: Take 10 mg by mouth daily  6/30/21   Kandis Brunner, APRN - CNP   lisinopril (PRINIVIL;ZESTRIL) 40 MG tablet Take 40 mg by mouth daily    Historical Provider, MD   insulin regular human (HUMULIN R U-500 KWIKPEN) 500 UNIT/ML SOPN concentrated injection pen Inject 110 Units into the skin every morning (before breakfast)    Historical Provider, MD   insulin regular human (HUMULIN R U-500 KWIKPEN) 500 UNIT/ML SOPN concentrated injection pen Inject 25 Units into the skin daily (before lunch)    Historical Provider, MD   insulin regular human (HUMULIN R U-500 KWIKPEN) 500 UNIT/ML SOPN concentrated injection pen Inject 90 Units into the skin Daily with supper    Historical Provider, MD   Cholecalciferol (VITAMIN D3) 50 MCG (2000 UT) CAPS TAKE 1 CAPSULE BY MOUTH EVERY DAY 11/24/20   Historical Provider, MD   acetaminophen (TYLENOL 8 HOUR) 650 MG extended release tablet Take 1 tablet by mouth 2 times daily as needed for Pain 2/10/21 1/17/22  Monique Calhoun PA-C   Handicap Placard MISC by Does not apply route 9/16/19   Monique Calhoun PA-C   Multiple Vitamins-Minerals [facility] SYSTEM ADVANCED 50 PLUS) TABS Take 1 tablet by mouth daily 1/14/19   Monique Calhoun PA-C   docusate sodium (COLACE) 100 MG capsule Take 100 mg by mouth 2 times daily    Historical Provider, MD   lidocaine (LIDODERM) 5 % Place 1 patch onto the skin daily 12 hours on, 12 hours off. Historical Provider, MD   Compression Stockings MISC Provide compressions stocking covered by insurance, 20 -30 6/4/18   Monique Calhoun PA-C   [facility]. Devices (COMMODE BEDSIDE) [facility] Provide bedside commode covered by insurance 3/21/18   Monique Calhoun PA-C   aspirin 81 MG chewable tablet Take 81 mg by mouth daily 2/9/18   Historical Provider, MD   ARTIFICIAL TEARS 0.2-0.2-1 % SOLN  2/9/18   Historical Provider, MD   Ascorbic Acid (VITAMIN C) 1000 MG tablet Take 1,000 mg by mouth daily 2/9/18   Historical Provider, MD   latanoprost (XALATAN) 0.005 % ophthalmic solution Place 1 drop into the right eye daily  12/4/17   Historical Provider, MD   Misc. Devices (TRANSFER BENCH) MISC Use shower transfer bench with back,  every time patient gets in/or out of the shower. Please dispense insurance approved shower transfer bench. 8/15/17   Vesta Dawn MD   carvedilol (COREG) 6.25 MG tablet Take 12.5 mg by mouth 2 times daily (with meals)     Historical Provider, MD Lynda Whiting LANCETS 54G [address] [facility]  10/5/16   Historical Provider, MD   ONE TOUCH ULTRA TEST strip  1/24/16   Historical Provider, MD   Blood Pressure Monitoring (B-D ASSURE BPM/AUTO WRIST CUFF) MISC As directed. Labile hypertension.  1/25/16   Vesta Dawn MD   B-D ULTRAFINE III SHORT PEN 31G X 8 MM MISC  3/15/14   Historical Provider, MD       Future Appointments   Date Time Provider Jack Shah   5/16/2022 10:00 AM Radha Cummings, APRN - [address] [facility]

## 2022-03-24 ENCOUNTER — TELEPHONE (OUTPATIENT)
Dept: PRIMARY CARE CLINIC | Age: 62
End: 2022-03-24

## 2022-03-24 NOTE — TELEPHONE ENCOUNTER
Bradford Villar RN at [facility] visited patient for her once a year home visit for Medicare. She called to advise that we should contact patient regarding high blood pressure, high blood sugar, and patient missing several medications in the home:    220/70 today  216/76 retake today  Heart rate was 80    Medications on her list that she doesn't have in the home:  Amlodipine, atorvastatin, lisinopril, Omega 3, and nafidopine. Blood sugars last week have been as low as 48 and as high as 378.

## 2022-03-28 ENCOUNTER — CARE COORDINATION (OUTPATIENT)
Dept: CARE COORDINATION | Age: 62
End: 2022-03-28

## 2022-03-29 NOTE — CARE COORDINATION
Patient goals reviewed:  1. Reviewed what a serving size is of carbs and how to measure servings out to limit carbohydrates at meals and snacks.  Goal is 45-60gms of carb/meal, 15-30gms/snack. 2. Patient will eat 3 meals daily spaced every 4-5 hours. Reviewed quick/easy lunch and breakfast suggestions.  Reviewed using Glucerna- when going too long between meals of skipping meals, patient states she is doing this. 3. Reviewed working on increasing non-starchy vegetables and lean protein sources. Focused on increasing protein sources due to leg wound- lean meats, eggs, cottage cheese, nut/seeds, ect.  Reviewed what non-starchy vegetables are and encouraged patient to make 1/2  plate non-starchy vegetables, 1/4 lean protein, 1/4 carbs at meals. Foods from each category reviewed along with serving size.    4. Reviewed avoiding sweets. Patient denies drinking sugary drinks.  Reviewed alternatives to sweets- sugar free pudding, jello,  No added sugar ice cream and limiting amount to 1/2 cup. Encouraged patient to keep sweets out of the house as much as possible and limit to once a week or less.   3/29/22- patient states no further nutrition questions, admits she is still non-compliant with diet at times put insists she is trying. Patient has been in care coordination >6 months. Mailed more Glucerna coupons. Patient provided with contact information to call with questions. Will remove from panel.   PRIYA Keating

## 2022-04-22 ENCOUNTER — TELEPHONE (OUTPATIENT)
Dept: PRIMARY CARE CLINIC | Age: 62
End: 2022-04-22

## 2022-04-22 DIAGNOSIS — Z76.0 MEDICATION REFILL: ICD-10-CM

## 2022-04-22 DIAGNOSIS — I10 ESSENTIAL HYPERTENSION: ICD-10-CM

## 2022-04-22 RX ORDER — AMLODIPINE BESYLATE 5 MG/1
TABLET ORAL
Qty: 90 TABLET | Refills: 0 | Status: SHIPPED | OUTPATIENT
Start: 2022-04-22 | End: 2022-06-13 | Stop reason: ALTCHOICE

## 2022-04-22 NOTE — TELEPHONE ENCOUNTER
Lorrie Ge from [facility] called requesting the most recent office visit note for pt. Pt is currently having home care.

## 2022-04-22 NOTE — TELEPHONE ENCOUNTER
Health Maintenance   Topic Date Due    DTaP/Tdap/Td vaccine (1 - Tdap) Never done    Shingles Vaccine (1 of 2) Never done    Diabetic foot exam  03/29/2017    Annual Wellness Visit (AWV)  Never done    Lipids  05/20/2020    Pneumococcal 0-64 years Vaccine (2 - PCV) 02/10/2021    Breast cancer screen  06/24/2021    Potassium  07/13/2021    Creatinine  07/13/2021    Diabetic retinal exam  01/20/2022    COVID-19 Vaccine (3 - Booster for Moderna series) 03/03/2022    A1C test (Diabetic or Prediabetic)  04/17/2022    Depression Screen  01/17/2023    Colorectal Cancer Screen  06/25/2028    Flu vaccine  Completed    Hepatitis C screen  Completed    HIV screen  Completed    Hepatitis A vaccine  Aged Out    Hib vaccine  Aged Out    Meningococcal (ACWY) vaccine  Aged Out             (applicable per patient's age: Cancer Screenings, Depression Screening, Fall Risk Screening, Immunizations)    Hemoglobin A1C (%)   Date Value   01/17/2022 10.4   07/12/2021 10.3   02/10/2021 11.0     Microalb/Crt.  Ratio (mcg/mg creat)   Date Value   04/04/2015 3,460     LDL Cholesterol (mg/dL)   Date Value   04/04/2015          AST (U/L)   Date Value   04/04/2015 19     ALT (U/L)   Date Value   04/04/2015 24     BUN (mg/dL)   Date Value   04/04/2015 14      (goal A1C is < 7)   (goal LDL is <100) need 30-50% reduction from baseline     BP Readings from Last 3 Encounters:   03/09/22 (!) 160/74   01/31/22 (!) 144/62   01/17/22 (!) 148/75    (goal /80)      All Future Testing planned in [facility]:  Lab Frequency Next Occurrence   EDITH Digital Screen Bilateral [[id number]] Once 11/26/2021   Lipid, Fasting Once 11/19/2021   US PELVIS COMPLETE Once 10/27/2021   POCT glycosylated hemoglobin (Hb A1C) Once 06/11/2022       Next Visit Date:  Future Appointments   Date Time Provider Jack Shah   5/16/2022 10:00 AM Jason Vincent, APRN - [address]             Patient Active Problem List:     Malignant neoplasm of uterus (HCC)     Acid reflux     Heart murmur     Class 3 severe obesity due to excess calories with serious comorbidity and body mass index (BMI) of 40.0 to 44.9 in adult [facility])     Type 2 diabetes mellitus treated with insulin (HCC)     Microalbuminuria     Noncompliance with treatment     Hypokalemia     Lymphedema of leg     Primary osteoarthritis of both knees     Bad memory     Unsteady gait     Calculus of gallbladder     Weakness of left side of body     AF (amaurosis fugax)     Chronic kidney disease     Abnormal liver enzymes     Ataxic aphasia     Herpes simplex type 2 infection     Temporary cerebral vascular dysfunction     Moderate or severe vision impairment, both eyes     Familial hyperlipidemia     Essential hypertension     Occipital cerebral infarction [facility])     Drug overdose     Hypersomnolence     Ischemic stroke ([address]     Right hemiparesis ([address]     Leg wound, right, initial encounter

## 2022-04-25 ENCOUNTER — TELEPHONE (OUTPATIENT)
Dept: PRIMARY CARE CLINIC | Age: 62
End: 2022-04-25

## 2022-04-25 NOTE — TELEPHONE ENCOUNTER
Erasto called to give PCP the FYI that Pt was hospitalized with hypoglycemic episode with fall and hypertensive episode in hospital.  Patient will be receiving home health with SN, OT, and PT. She scheduled PT's post hospital visit with you also.

## 2022-04-27 ENCOUNTER — TELEPHONE (OUTPATIENT)
Dept: PRIMARY CARE CLINIC | Age: 62
End: 2022-04-27

## 2022-04-27 NOTE — TELEPHONE ENCOUNTER
PTs blood pressure was 200/118 at 9 a.m. and 185/92 at 10:00 a.m. Second check on left arm at 10 a.m. was 179/90. Patient has not taken her meds yet this morning though. PT has appt tomorrow with PCP.

## 2022-04-27 NOTE — TELEPHONE ENCOUNTER
Please contact patient and remind her to take medication tomorrow prior to visit so we may evaluate effectiveness of blood pressure medication

## 2022-04-28 ENCOUNTER — OFFICE VISIT (OUTPATIENT)
Dept: PRIMARY CARE CLINIC | Age: 62
End: 2022-04-28
Payer: MEDICARE

## 2022-04-28 VITALS
OXYGEN SATURATION: 100 % | WEIGHT: 284 LBS | BODY MASS INDEX: 45.84 KG/M2 | TEMPERATURE: 97.2 F | DIASTOLIC BLOOD PRESSURE: 76 MMHG | SYSTOLIC BLOOD PRESSURE: 162 MMHG | HEART RATE: 58 BPM

## 2022-04-28 DIAGNOSIS — E11.42 TYPE 2 DIABETES MELLITUS WITH DIABETIC POLYNEUROPATHY, WITH LONG-TERM CURRENT USE OF INSULIN (HCC): ICD-10-CM

## 2022-04-28 DIAGNOSIS — R06.09 DYSPNEA ON MINIMAL EXERTION: ICD-10-CM

## 2022-04-28 DIAGNOSIS — N17.9 AKI (ACUTE KIDNEY INJURY) (HCC): ICD-10-CM

## 2022-04-28 DIAGNOSIS — Z09 HOSPITAL DISCHARGE FOLLOW-UP: ICD-10-CM

## 2022-04-28 DIAGNOSIS — Z79.4 TYPE 2 DIABETES MELLITUS WITH DIABETIC POLYNEUROPATHY, WITH LONG-TERM CURRENT USE OF INSULIN (HCC): ICD-10-CM

## 2022-04-28 DIAGNOSIS — G47.19 EXCESSIVE DAYTIME SLEEPINESS: ICD-10-CM

## 2022-04-28 DIAGNOSIS — I10 ESSENTIAL HYPERTENSION: Primary | ICD-10-CM

## 2022-04-28 PROCEDURE — 2022F DILAT RTA XM EVC RTNOPTHY: CPT | Performed by: NURSE PRACTITIONER

## 2022-04-28 PROCEDURE — 99214 OFFICE O/P EST MOD 30 MIN: CPT | Performed by: NURSE PRACTITIONER

## 2022-04-28 PROCEDURE — G8417 CALC BMI ABV UP PARAM F/U: HCPCS | Performed by: NURSE PRACTITIONER

## 2022-04-28 PROCEDURE — 1036F TOBACCO NON-USER: CPT | Performed by: NURSE PRACTITIONER

## 2022-04-28 PROCEDURE — 1111F DSCHRG MED/CURRENT MED MERGE: CPT | Performed by: NURSE PRACTITIONER

## 2022-04-28 PROCEDURE — 3046F HEMOGLOBIN A1C LEVEL >9.0%: CPT | Performed by: NURSE PRACTITIONER

## 2022-04-28 PROCEDURE — G8427 DOCREV CUR MEDS BY ELIG CLIN: HCPCS | Performed by: NURSE PRACTITIONER

## 2022-04-28 PROCEDURE — 3017F COLORECTAL CA SCREEN DOC REV: CPT | Performed by: NURSE PRACTITIONER

## 2022-04-28 RX ORDER — HYDRALAZINE HYDROCHLORIDE 50 MG/1
50 TABLET, FILM COATED ORAL 3 TIMES DAILY
Qty: 90 TABLET | Refills: 1 | Status: SHIPPED | OUTPATIENT
Start: 2022-04-28 | End: 2022-05-23 | Stop reason: ALTCHOICE

## 2022-04-28 RX ORDER — FUROSEMIDE 80 MG
80 TABLET ORAL SEE ADMIN INSTRUCTIONS
Qty: 60 TABLET | Refills: 0 | Status: SHIPPED
Start: 2022-04-28 | End: 2022-06-13 | Stop reason: DRUGHIGH

## 2022-04-28 RX ORDER — CARVEDILOL 12.5 MG/1
12.5 TABLET ORAL 2 TIMES DAILY WITH MEALS
Qty: 60 TABLET | Refills: 0 | Status: SHIPPED | OUTPATIENT
Start: 2022-04-28 | End: 2022-06-13 | Stop reason: DRUGHIGH

## 2022-04-28 ASSESSMENT — ENCOUNTER SYMPTOMS
WHEEZING: 0
NAUSEA: 0
COUGH: 0
DIARRHEA: 0
SHORTNESS OF BREATH: 1
VOMITING: 0
TROUBLE SWALLOWING: 0
BLOOD IN STOOL: 0
BACK PAIN: 0
CONSTIPATION: 0

## 2022-04-28 NOTE — PROGRESS NOTES
An electronic signature was used to authenticate this note. --Shannen Patel MA on 4/28/2022 at 10:20 AM  Visit Information    Have you changed or started any medications since your last visit including any over-the-counter medicines, vitamins, or herbal medicines? no   Are you having any side effects from any of your medications? -  no  Have you stopped taking any of your medications? Is so, why? -  no    Have you seen any other physician or provider since your last visit? Yes - Records Obtained  Have you had any other diagnostic tests since your last visit? Yes - Records Obtained  Have you been seen in the emergency room and/or had an admission to a hospital since we last saw you? Yes - Records Obtained  Have you had your routine dental cleaning in the past 6 months? no    Have you activated your [employer] account? If not, what are your barriers?  Yes     Patient Care Team:  Mortimer Singleton, APRN - CNP as PCP - General (Family Medicine)  Mortimer Singleton, APRN - CNP as PCP - [facility]  Abrahan Corona as Consulting Physician (Endocrinology)  KLARISSA Peterson CNP as Nurse Practitioner (Neurology)  Lana Joe MD as Consulting Physician (Ophthalmology)  Moises Gannon as Ambulatory Care Manager    Medical History Review  Past Medical, Family, and Social History reviewed and does not contribute to the patient presenting condition    Health Maintenance   Topic Date Due    DTaP/Tdap/Td vaccine (1 - Tdap) Never done    Shingles Vaccine (1 of 2) Never done    Diabetic foot exam  03/29/2017    Annual Wellness Visit (AWV)  Never done    Lipids  05/20/2020    Pneumococcal 0-64 years Vaccine (2 - PCV) 02/10/2021    Breast cancer screen  06/24/2021    Diabetic retinal exam  01/20/2022    COVID-19 Vaccine (3 - Booster for Ellan Sox series) 03/03/2022    A1C test (Diabetic or Prediabetic)  04/17/2022    Potassium  08/23/2022    Creatinine  08/23/2022    Depression Screen  01/17/2023    Colorectal Cancer Screen  06/25/2028    Flu vaccine  Completed    Hepatitis C screen  Completed    HIV screen  Completed    Hepatitis A vaccine  Aged Out    Hib vaccine  Aged Out    Meningococcal (ACWY) vaccine  Aged Out

## 2022-04-28 NOTE — PROGRESS NOTES
Post-Discharge Transitional Care Management Progress Note      Elidia Kam   YOB: 1960    Date of Office Visit:  4/28/2022  Date of Hospital Admission: 4/19/22   Date of Hospital Discharge: 4/21/22    Care management risk score Rising risk (score 2-5) and Complex Care (Scores >=6): 5     Non face to face  following discharge, date last encounter closed (first attempt may have been earlier): *No documented post hospital discharge outreach found in the last 14 days *No documented post hospital discharge outreach found in the last 14 days    Call initiated 2 business days of discharge: *No response recorded in the last 14 days    ASSESSMENT/PLAN:   Essential hypertension  -     carvedilol (COREG) 12.5 MG tablet; Take 1 tablet by mouth 2 times daily (with meals), Disp-60 tablet, R-0Normal  -     Basic Metabolic Panel; Future  -     hydrALAZINE (APRESOLINE) 50 MG tablet; Take 1 tablet by mouth 3 times daily, Disp-90 tablet, R-1Normal  Hospital discharge follow-up  -     RI DISCHARGE MEDS RECONCILED W/ CURRENT OUTPATIENT MED LIST  Type 2 diabetes mellitus with diabetic polyneuropathy, with long-term current use of insulin (HCC)  MARBELLA (acute kidney injury) ([facility] [facility]  -     Basic Metabolic Panel; Future  Excessive daytime sleepiness  -     Baseline Diagnostic Sleep Study; Future  Dyspnea on minimal exertion      Medical Decision Making: moderate complexity  Return in about 6 weeks (around 6/9/2022) for Diabetes, HTN. On this date 4/28/2022 I have spent 25 minutes reviewing previous notes, test results and face to face with the patient discussing the diagnosis and importance of compliance with the treatment plan as well as documenting on the day of the visit.      Subjective:   HPI:  Follow up of Hospital problems/diagnosis(es): MARBELLA, HTN, hypoglycemia at [facility] she had low blood sugar after having teeth pulled the day prior and had not been able to eat as much as usual.     [facility] endocrinology on 5/2. Stephanie Sow with [facility]. Has had glucose level of 86 since discharge. Has seen as low as 60. Insulin has not been adjusted at discharge. Cut back on insulin to 60 in th AM and bedtime, and stayed with 25 at lunch. Did call nephrology, but was told she could not been seen until June. She did not schedule. Follows with [facility]    Inpatient course: Discharge summary reviewed- see chart. Interval history/Current status: Blood pressure remains uncontrolled    Patient Active Problem List   Diagnosis    Malignant neoplasm of uterus ([address]    Acid reflux    Heart murmur    Class 3 severe obesity due to excess calories with serious comorbidity and body mass index (BMI) of 40.0 to 44.9 in adult [facility])    Type 2 diabetes mellitus treated with insulin (HCC)    Microalbuminuria    Noncompliance with treatment    Hypokalemia    Lymphedema of leg    Primary osteoarthritis of both knees    Bad memory    Unsteady gait    Calculus of gallbladder    Weakness of left side of body    AF (amaurosis fugax)    Chronic kidney disease    Abnormal liver enzymes    Ataxic aphasia    Herpes simplex type 2 infection    Temporary cerebral vascular dysfunction    Moderate or severe vision impairment, both eyes    Familial hyperlipidemia    Essential hypertension    Occipital cerebral infarction ([address]    Drug overdose    Hypersomnolence    Ischemic stroke ([address]    Right hemiparesis ([address]    Leg wound, right, initial encounter       Medications listed as ordered at the time of discharge from hospital     Medication List          Accurate as of April 28, 2022 12:06 PM. If you have any questions, ask your nurse or doctor.             START taking these medications    hydrALAZINE 50 MG tablet  Commonly known as: APRESOLINE  Take 1 tablet by mouth 3 times daily  Started by: KLARISSA Farfan CNP        CHANGE how you take these medications    amLODIPine 5 MG tablet  Commonly known as: NORVASC  TAKE 1 TABLET BY MOUTH EVERY DAY  What changed: See the new instructions. carvedilol 12.5 MG tablet  Commonly known as: COREG  Take 1 tablet by mouth 2 times daily (with meals)  What changed: medication strength  Changed by: KLARISSA Donovan CNP     furosemide 80 MG tablet  Commonly known as: LASIX  Take 1 tablet by mouth See Admin Instructions 80 mg in AM  What changed: additional instructions  Changed by: KLARISSA Donovan CNP        CONTINUE taking these medications    * acetaminophen 500 MG tablet  Commonly known as: TYLENOL     * acetaminophen 650 MG extended release tablet  Commonly known as: Tylenol 8 Hour  Take 1 tablet by mouth 2 times daily as needed for Pain     Artificial Tears 0.2-0.2-1 % Soln opthalmic solution  Generic drug: glycerin-hypromellose-     aspirin 81 MG chewable tablet     atorvastatin 80 MG tablet  Commonly known as: LIPITOR  Take 1 tablet by mouth daily     B-D ASSURE BPM/AUTO WRIST CUFF Misc  As directed. Labile hypertension.      B-D ULTRAFINE III SHORT PEN 31G X 8 MM Misc  Generic drug: Insulin Pen Needle     BiDil 20-37.5 MG per tablet  Generic drug: isosorbide-hydrALAZINE  Take 1 tablet by mouth 3 times daily     cloNIDine 0.3 MG tablet  Commonly known as: CATAPRES  Take 1 tablet by mouth 3 times daily     Compression Stockings Misc  Provide compressions stocking covered by insurance, 20 -30     docusate sodium 100 MG capsule  Commonly known as: COLACE     famotidine 20 MG tablet  Commonly known as: PEPCID  TAKE 1 TABLET BY MOUTH EVERY DAY     fenofibrate micronized 67 MG capsule  Commonly known as: LOFIBRA  TAKE 1 CAPSULE BY MOUTH EVERY DAY IN THE MORNING BEFORE BREAKFAST     Handicap Placard Misc  by Does not apply route     * HumuLIN R U-500 KwikPen 500 UNIT/ML Sopn concentrated injection pen  Generic drug: insulin regular human     * HumuLIN R U-500 KwikPen 500 UNIT/ML Sopn concentrated injection pen  Generic drug: insulin regular human     * HumuLIN R U-500 KwikPen 500 UNIT/ML Sopn concentrated injection pen  Generic drug: insulin regular human     latanoprost 0.005 % ophthalmic solution  Commonly known as: XALATAN     lidocaine 5 %  Commonly known as: LIDODERM     lisinopril 40 MG tablet  Commonly known as: PRINIVIL;ZESTRIL     NIFEdipine 90 MG extended release tablet  Commonly known as: ADALAT CC     ONE TOUCH ULTRA TEST strip  Generic drug: blood glucose test strips     OneTouch Delica Lancets 39L Misc     Repatha 140 MG/ML Sosy  Generic drug: Evolocumab     Theragran-M Advanced 50 Plus Tabs  Take 1 tablet by mouth daily     * Transfer Bench Misc  Use shower transfer bench with back,  every time patient gets in/or out of the shower. Please dispense insurance approved shower transfer bench. * Commode Bedside Misc  Provide bedside commode covered by insurance     Tums Ultra 1000 1000 MG Chew  Generic drug: Calcium Carbonate Antacid     vitamin C 1000 MG tablet     Vitamin D3 50 MCG (2000 UT) Caps         * This list has 7 medication(s) that are the same as other medications prescribed for you. Read the directions carefully, and ask your doctor or other care provider to review them with you.                Where to Get Your Medications      These medications were sent to [address] [place] [address] 18, [address] [facility] [address] [address]    Hours: 24-hours Phone: [phone]   · carvedilol 12.5 MG tablet  · hydrALAZINE 50 MG tablet     Information about where to get these medications is not yet available    Ask your nurse or doctor about these medications  · furosemide 80 MG tablet           Medications marked \"taking\" at this time  Outpatient Medications Marked as Taking for the 4/28/22 encounter (Office Visit) with KLARISSA Farfan - CNP   Medication Sig Dispense Refill    furosemide (LASIX) 80 MG tablet Take 1 tablet by mouth See Admin Instructions 80 mg in AM 60 tablet 0    carvedilol (COREG) 12.5 MG tablet Take 1 tablet by mouth 2 times daily (with meals) 60 tablet 0    hydrALAZINE (APRESOLINE) 50 MG tablet Take 1 tablet by mouth 3 times daily 90 tablet 1    amLODIPine (NORVASC) 5 MG tablet TAKE 1 TABLET BY MOUTH EVERY DAY (Patient taking differently: 10 mg ) 90 tablet 0    Calcium Carbonate Antacid (TUMS ULTRA 1000) 1000 MG CHEW Take 1,000 mg by mouth      acetaminophen (TYLENOL) 500 MG tablet Take 500 mg by mouth every 6 hours as needed for Pain      REPATHA 140 MG/ML SOSY 1 ML EVERY 15 DAYS SUBCUTANEOUS 90 DAYS      isosorbide-hydrALAZINE (BIDIL) 20-37.5 MG per tablet Take 1 tablet by mouth 3 times daily 180 tablet 3    fenofibrate micronized (LOFIBRA) 67 MG capsule TAKE 1 CAPSULE BY MOUTH EVERY DAY IN THE MORNING BEFORE BREAKFAST 90 capsule 1    NIFEdipine (ADALAT CC) 90 MG extended release tablet Take 90 mg by mouth daily      famotidine (PEPCID) 20 MG tablet TAKE 1 TABLET BY MOUTH EVERY DAY 90 tablet 1    atorvastatin (LIPITOR) 80 MG tablet Take 1 tablet by mouth daily 90 tablet 1    cloNIDine (CATAPRES) 0.3 MG tablet Take 1 tablet by mouth 3 times daily 90 tablet 0    insulin regular human (HUMULIN R U-500 KWIKPEN) 500 UNIT/ML SOPN concentrated injection pen Inject 110 Units into the skin every morning (before breakfast)      insulin regular human (HUMULIN R U-500 KWIKPEN) 500 UNIT/ML SOPN concentrated injection pen Inject 25 Units into the skin daily (before lunch)      insulin regular human (HUMULIN R U-500 KWIKPEN) 500 UNIT/ML SOPN concentrated injection pen Inject 90 Units into the skin Daily with supper      Cholecalciferol (VITAMIN D3) 50 MCG (2000 UT) CAPS TAKE 1 CAPSULE BY MOUTH EVERY DAY      acetaminophen (TYLENOL 8 HOUR) 650 MG extended release tablet Take 1 tablet by mouth 2 times daily as needed for Pain 24 tablet 0    Handicap Placard MISC by Does not apply route 1 each 0    Multiple Vitamins-Minerals (THERAGRAN-M ADVANCED 50 PLUS) TABS Take 1 tablet by mouth daily 30 tablet 3    docusate sodium (COLACE) 100 MG capsule Take 100 mg by mouth 2 times daily      lidocaine (LIDODERM) 5 % Place 1 patch onto the skin daily 12 hours on, 12 hours off.  Compression Stockings MISC Provide compressions stocking covered by insurance, 20 -30 2 each 0    Misc. Devices (COMMODE BEDSIDE) MIS Provide bedside commode covered by insurance 1 each 0    aspirin 81 MG chewable tablet Take 81 mg by mouth daily      ARTIFICIAL TEARS 0.2-0.2-1 % SOLN       Ascorbic Acid (VITAMIN C) 1000 MG tablet Take 1,000 mg by mouth daily      latanoprost (XALATAN) 0.005 % ophthalmic solution Place 1 drop into the right eye daily       Misc. Devices (TRANSFER BENCH) MISC Use shower transfer bench with back,  every time patient gets in/or out of the shower. Please dispense insurance approved shower transfer bench. 1 each 0    ONETOUCH DELICA LANCETS 69D MISC       ONE TOUCH ULTRA TEST strip       Blood Pressure Monitoring (B-D ASSURE BPM/AUTO WRIST CUFF) MISC As directed. Labile hypertension. 1 each 0    B-D ULTRAFINE III SHORT PEN 31G X 8 MM MISC           Medications patient taking as of now reconciled against medications ordered at time of hospital discharge: Yes    Review of Systems   Constitutional: Negative for chills and fever. HENT: Negative for congestion and trouble swallowing. Respiratory: Positive for shortness of breath (on exertion). Negative for cough and wheezing. Cardiovascular: Positive for leg swelling. Negative for chest pain. Gastrointestinal: Negative for blood in stool, constipation, diarrhea, nausea and vomiting. Genitourinary: Negative for difficulty urinating, dysuria, frequency and urgency. Musculoskeletal: Positive for arthralgias. Negative for back pain, myalgias and neck pain. Neurological: Negative for dizziness, tremors, syncope and headaches.              Objective:    BP (!) 162/76   Pulse 58   Temp 97.2 °F (36.2 °C) (Temporal)   Wt 284 lb (128.8 kg)   LMP  (LMP Unknown)   SpO2 100%   BMI 45.84 kg/m²     Physical Exam  Vitals reviewed. Constitutional:       Appearance: Normal appearance. She is well-developed and well-groomed. She is obese. HENT:      Head: Normocephalic and atraumatic. Right Ear: External ear normal.      Left Ear: External ear normal.      Nose: Nose normal.   Eyes:      General: Lids are normal. No scleral icterus. Conjunctiva/sclera: Conjunctivae normal.      Pupils: Pupils are equal, round, and reactive to light. Cardiovascular:      Rate and Rhythm: Normal rate and regular rhythm. Heart sounds: Normal heart sounds. Pulmonary:      Effort: Pulmonary effort is normal.      Breath sounds: Normal breath sounds. No decreased air movement. Abdominal:      General: Bowel sounds are normal.      Palpations: Abdomen is soft. There is no mass. Tenderness: There is no abdominal tenderness. Musculoskeletal:      Cervical back: Normal range of motion and neck supple. Right lower le+ Pitting Edema present. Left lower le+ Pitting Edema present. Skin:     General: Skin is warm and dry. Neurological:      Mental Status: She is alert and oriented to person, place, and time. Gait: Gait abnormal.   Psychiatric:         Behavior: Behavior is cooperative. Thought Content: Thought content normal.         Judgment: Judgment normal.         An electronic signature was used to authenticate this note.   --KLARISSA Gonzalez - CNP

## 2022-04-29 ENCOUNTER — TELEPHONE (OUTPATIENT)
Dept: PRIMARY CARE CLINIC | Age: 62
End: 2022-04-29

## 2022-04-29 ENCOUNTER — CARE COORDINATION (OUTPATIENT)
Dept: CARE COORDINATION | Age: 62
End: 2022-04-29

## 2022-04-29 SDOH — HEALTH STABILITY: PHYSICAL HEALTH: ON AVERAGE, HOW MANY DAYS PER WEEK DO YOU ENGAGE IN MODERATE TO STRENUOUS EXERCISE (LIKE A BRISK WALK)?: 0 DAYS

## 2022-04-29 SDOH — HEALTH STABILITY: PHYSICAL HEALTH: ON AVERAGE, HOW MANY MINUTES DO YOU ENGAGE IN EXERCISE AT THIS LEVEL?: 0 MIN

## 2022-04-29 NOTE — CARE COORDINATION
Ambulatory Care Coordination Note  4/29/2022  CM Risk Score: 5  Charlson 10 Year Mortality Risk Score: 100%     ACC: Mitesh Guzmán    Summary Note: Simón Marques reports her blood pressure is high, 200/80. She has not started taking the medications her PCP ordered yesterday. She states the pharmacy did not have her medications ready yesterday. She reports her sitter will pick them up for her after she gets off work and bring them to her. Simón Marques will take them when her sister arrives. Her fasting blood sugar was 147 fasting and 251 before lunch at 3:30 PM. She reports she has had low blood sugars lately also. She does see Dr. Tatiana Marshall on Monday. CM reminded her to take her log with her. Simón Marques reports she had teeth pulled. She states she needs to go back and have some more pulled. Plan:  F/U on Arletha's B/P on Monday. Diabetes Assessment    Medic Alert ID: No  Meal Planning: Carb counting, Avoidance of concentrated sweets, Other   How often do you test your blood sugar?: Meals, Bedtime   Do you have barriers with adherence to non-pharmacologic self-management interventions? (Nutrition/Exercise/Self-Monitoring): Yes   Have you ever had to go to the ED for symptoms of low blood sugar?: Yes   What is the date of your last ED visit for low blood sugar?: 4/19/22       No patient-reported symptoms   Do you have hyperglycemia symptoms?: No   Do you have hypoglycemia symptoms?: No   Last Blood Sugar Value: 251   Blood Sugar Monitoring Regimen: Before Meals, At Bedtime   Blood Sugar Trends: Fluctuating              Care Coordination Interventions    Program Enrollment: Complex Care  Referral from Primary Care Provider: No  Suggested Interventions and Community Resources  Diabetes Education: Completed  Zone Management Tools: Completed  Other Services or Interventions:  An adult sitter stays with Simón Marques while her brother is at work         Goals Addressed    None         Prior to Admission medications    Medication Sig Start Date End Date Taking?  Authorizing Provider   furosemide (LASIX) 80 MG tablet Take 1 tablet by mouth See Admin Instructions 80 mg in AM 4/28/22   KLARISSA Christian CNP   carvedilol (COREG) 12.5 MG tablet Take 1 tablet by mouth 2 times daily (with meals) 4/28/22   KLARISSA Christian CNP   hydrALAZINE (APRESOLINE) 50 MG tablet Take 1 tablet by mouth 3 times daily 4/28/22   KLARISSA Christian CNP   amLODIPine (NORVASC) 5 MG tablet TAKE 1 TABLET BY MOUTH EVERY DAY  Patient taking differently: 10 mg  4/22/22   KLARISSA Christian CNP   Calcium Carbonate Antacid (TUMS ULTRA 1000) 1000 MG CHEW Take 1,000 mg by mouth    Historical Provider, MD   acetaminophen (TYLENOL) 500 MG tablet Take 500 mg by mouth every 6 hours as needed for Pain    Historical Provider, MD   REPATHA 140 MG/ML SOSY 1 ML EVERY 15 DAYS SUBCUTANEOUS 90 DAYS 2/1/22   Historical Provider, MD   isosorbide-hydrALAZINE (BIDIL) 20-37.5 MG per tablet Take 1 tablet by mouth 3 times daily 3/9/22   Ria Samaniego MD   fenofibrate micronized (LOFIBRA) 67 MG capsule TAKE 1 CAPSULE BY MOUTH EVERY DAY IN THE MORNING BEFORE BREAKFAST 1/3/22   KLARISSA Christian CNP   NIFEdipine (ADALAT CC) 90 MG extended release tablet Take 90 mg by mouth daily    Historical Provider, MD   famotidine (PEPCID) 20 MG tablet TAKE 1 TABLET BY MOUTH EVERY DAY 12/2/21   KLARISSA Christian CNP   atorvastatin (LIPITOR) 80 MG tablet Take 1 tablet by mouth daily 10/12/21   KLARISSA Christian CNP   cloNIDine (CATAPRES) 0.3 MG tablet Take 1 tablet by mouth 3 times daily 7/12/21 3/9/23  KLARISSA Christian CNP   lisinopril (PRINIVIL;ZESTRIL) 40 MG tablet Take 40 mg by mouth daily  Patient not taking: Reported on 4/28/2022    Historical Provider, MD   insulin regular human (HUMULIN R U-500 KWIKPEN) 500 UNIT/ML SOPN concentrated injection pen Inject 110 Units into the skin every morning (before breakfast)    Historical Provider, MD   insulin regular human (HUMULIN R U-500 KWIKPEN) 500 UNIT/ML SOPN concentrated injection pen Inject 25 Units into the skin daily (before lunch)    Historical Provider, MD   insulin regular human (HUMULIN R U-500 KWIKPEN) 500 UNIT/ML SOPN concentrated injection pen Inject 90 Units into the skin Daily with supper    Historical Provider, MD   Cholecalciferol (VITAMIN D3) 50 MCG (2000 UT) CAPS TAKE 1 CAPSULE BY MOUTH EVERY DAY 11/24/20   Historical Provider, MD   acetaminophen (TYLENOL 8 HOUR) 650 MG extended release tablet Take 1 tablet by mouth 2 times daily as needed for Pain 2/10/21 4/28/22  Desiree Black PA-C   Handicap Placard MISC by Does not apply route 9/16/19   Desiree Black PA-C   Multiple Vitamins-Minerals [facility] SYSTEM ADVANCED 50 PLUS) TABS Take 1 tablet by mouth daily 1/14/19   Desiree Black PA-C   docusate sodium (COLACE) 100 MG capsule Take 100 mg by mouth 2 times daily    Historical Provider, MD   lidocaine (LIDODERM) 5 % Place 1 patch onto the skin daily 12 hours on, 12 hours off. Historical Provider, MD   Compression Stockings MISC Provide compressions stocking covered by insurance, 20 -30 6/4/18   CHARU [employer]. Devices (COMMODE BEDSIDE) [facility] Provide bedside commode covered by insurance 3/21/18   Desiree Black PA-C   aspirin 81 MG chewable tablet Take 81 mg by mouth daily 2/9/18   Historical Provider, MD   ARTIFICIAL TEARS 0.2-0.2-1 % SOLN  2/9/18   Historical Provider, MD   Ascorbic Acid (VITAMIN C) 1000 MG tablet Take 1,000 mg by mouth daily 2/9/18   Historical Provider, MD   latanoprost (XALATAN) 0.005 % ophthalmic solution Place 1 drop into the right eye daily  12/4/17   Historical Provider, MD   Misc. Devices (TRANSFER BENCH) MISC Use shower transfer bench with back,  every time patient gets in/or out of the shower. Please dispense insurance approved shower transfer bench.  8/15/17   MD Carlos Coleman LANCETS 56Z MISC  10/5/16   Historical Provider, MD ONE TOUCH ULTRA TEST strip  1/24/16   Historical Provider, MD   Blood Pressure Monitoring (B-D ASSURE BPM/AUTO WRIST CUFF) MISC As directed. Labile hypertension.  1/25/16   Serena Rodriguez MD   B-D ULTRAFINE III SHORT PEN 31G X 8 MM MISC  3/15/14   Historical Provider, MD       Future Appointments   Date Time Provider Jack Shah   6/13/2022 10:30 AM KLARISSA Gonzalez - CNP [facility]

## 2022-05-02 ENCOUNTER — CARE COORDINATION (OUTPATIENT)
Dept: CARE COORDINATION | Age: 62
End: 2022-05-02

## 2022-05-04 ENCOUNTER — TELEPHONE (OUTPATIENT)
Dept: PRIMARY CARE CLINIC | Age: 62
End: 2022-05-04

## 2022-05-04 NOTE — TELEPHONE ENCOUNTER
Home health nurse called and wants to inform that pt's blood pressure was 170-92 today. Nurse also wants to know if pt should be on both Bidil (Isosorbide-hydralazine) 20-37.5 mg that was prescribed on 3/9/22  and Hydrazaline 50 mg that was prescribed on 4/28/22. Please advise.

## 2022-05-04 NOTE — TELEPHONE ENCOUNTER
No, she should only be on 1 medication. At her most recent visit she denied having the Bidil and stated she was not taking that medication. What blood pressure medication does she currently have at home?

## 2022-05-05 NOTE — TELEPHONE ENCOUNTER
Writer placed call to [facility]. Spoke with Villa Figueredo, informed of pcp message. Will have pt home nurse Elana myers back directly to speak with office staff.

## 2022-05-05 NOTE — TELEPHONE ENCOUNTER
Germaine Reddy, home health care nurse, called and informed writer that pt only has Hydrazaline in the home and has not picked up her Bidil presription. Home health nurse wanted to verify that pt should be on Amlodipine as prescribed and also make PCP aware that pt came home from hospital with Clonodine. Writer confirmed with PCP and conveyed to nurse that patient should take Hydrazaline and not Bidil, should take Amlodipine as prescribed, and also that PCP is aware that patient is on Clonopine.

## 2022-05-10 ENCOUNTER — TELEPHONE (OUTPATIENT)
Dept: PRIMARY CARE CLINIC | Age: 62
End: 2022-05-10

## 2022-05-10 NOTE — TELEPHONE ENCOUNTER
Home Health provider Sunitha Echeverria called to inform patient was in hospital [facility]) for hypoglycemia and altered mental status (5/09/2022). Per Sunitha Echeverria she has been monitoring patients B/P which has been fluctuating with today's reading at 172/96 along with edema around patient's thigh. Patient also has neuro appt scheduled for 5/19/2022.

## 2022-05-11 ENCOUNTER — CARE COORDINATION (OUTPATIENT)
Dept: CARE COORDINATION | Age: 62
End: 2022-05-11

## 2022-05-12 NOTE — TELEPHONE ENCOUNTER
Writer placed call to [email or web address] [phone]. Left message with Coretta Summers for home nurse Ksenia Kumari to return call to office.

## 2022-05-16 LAB
BUN BLDV-MCNC: 44 MG/DL
CALCIUM SERPL-MCNC: 8.8 MG/DL
CHLORIDE BLD-SCNC: 109 MMOL/L
CO2: 25 MMOL/L
CREAT SERPL-MCNC: 2.74 MG/DL
GFR CALCULATED: 21
GLUCOSE BLD-MCNC: 208 MG/DL
POTASSIUM SERPL-SCNC: 4.5 MMOL/L
SODIUM BLD-SCNC: 144 MMOL/L

## 2022-05-18 ENCOUNTER — TELEPHONE (OUTPATIENT)
Dept: PRIMARY CARE CLINIC | Age: 62
End: 2022-05-18

## 2022-05-18 NOTE — TELEPHONE ENCOUNTER
BP readings reported 194/86 - today. Patient has been in the 160's, 180's and up to 991 systolic in last 2 weeks. Pt is taking all of her medications. Patient had a dull headache today when she work up but has had no heart palpitations. Right now, patient has no symptoms. She took a tylenol for headache. Can they do a light PT session with walking and chair exercises?

## 2022-05-18 NOTE — TELEPHONE ENCOUNTER
Light PT is acceptable. Continue to follow with nephrology in regards to uncontrolled hypertension.   Verify if patient has a cardiologist, if not I will place referral for evaluation

## 2022-05-20 DIAGNOSIS — N17.9 AKI (ACUTE KIDNEY INJURY) (HCC): ICD-10-CM

## 2022-05-20 DIAGNOSIS — I10 ESSENTIAL HYPERTENSION: ICD-10-CM

## 2022-05-21 DIAGNOSIS — I10 ESSENTIAL HYPERTENSION: ICD-10-CM

## 2022-05-21 RX ORDER — HYDRALAZINE HYDROCHLORIDE 50 MG/1
TABLET, FILM COATED ORAL
Qty: 90 TABLET | Refills: 1 | OUTPATIENT
Start: 2022-05-21

## 2022-05-24 ENCOUNTER — CARE COORDINATION (OUTPATIENT)
Dept: CARE COORDINATION | Age: 62
End: 2022-05-24

## 2022-05-24 NOTE — CARE COORDINATION
Ambulatory Care Coordination Note  5/24/2022  CM Risk Score: 5  Charlson 10 Year Mortality Risk Score: 100%     ACC: Anitra Moses    Summary Note: Don Blair reports she had the rest of her lower teeth pulled yesterday. She has her upper dentures in for 48 hours to get her gums adapted to the denture. She reports she is able to eat a soft diet. She is attempting to eat despite any discomfort. She states she doesn't want her blood sugar to fall and have to go to the hospital again. She continues to check her blood sugar and B/P regularly. She reports a Free Rozella Countess was delivered to her home. She states she has a 15year old niece living with her. Her niece states she can help her learn how to use the CGM. She states her mother had used one. Don Blair is having some difficulty speaking d/t her recent dental work. CM will plan to call later this week. Plan:  F/U with Don Blair on Friday to see how she is doing with the CGM. Lab Results     None          Care Coordination Interventions    Program Enrollment: Complex Care  Referral from Primary Care Provider: No  Suggested Interventions and Community Resources  Diabetes Education: Completed  Zone Management Tools: Completed  Other Services or Interventions: An adult sitter stays with Don Blair while her brother is at work         Goals Addressed    None         Prior to Admission medications    Medication Sig Start Date End Date Taking?  Authorizing Provider   furosemide (LASIX) 80 MG tablet Take 1 tablet by mouth See Admin Instructions 80 mg in AM 4/28/22   Madalynn Area, APRN - CNP   carvedilol (COREG) 12.5 MG tablet Take 1 tablet by mouth 2 times daily (with meals) 4/28/22   Madalynn Area, APRN - CNP   amLODIPine (NORVASC) 5 MG tablet TAKE 1 TABLET BY MOUTH EVERY DAY  Patient taking differently: 10 mg  4/22/22   Madalynn Area, APRN - CNP   Calcium Carbonate Antacid (TUMS ULTRA 1000) 1000 MG CHEW Take 1,000 mg by mouth    Historical Provider, MD   acetaminophen (TYLENOL) 500 MG tablet Take 500 mg by mouth every 6 hours as needed for Pain    Historical Provider, MD   REPATHA 140 MG/ML SOSY 1 ML EVERY 15 DAYS SUBCUTANEOUS 90 DAYS 2/1/22   Historical Provider, MD   isosorbide-hydrALAZINE (BIDIL) 20-37.5 MG per tablet Take 1 tablet by mouth 3 times daily 3/9/22   Jennifer Gutierrez MD   fenofibrate micronized (LOFIBRA) 67 MG capsule TAKE 1 CAPSULE BY MOUTH EVERY DAY IN THE MORNING BEFORE BREAKFAST 1/3/22   KLARISSA Jauregui CNP   NIFEdipine (ADALAT CC) 90 MG extended release tablet Take 90 mg by mouth daily    Historical Provider, MD   famotidine (PEPCID) 20 MG tablet TAKE 1 TABLET BY MOUTH EVERY DAY 12/2/21   May KLARISSA Hernandez CNP   atorvastatin (LIPITOR) 80 MG tablet Take 1 tablet by mouth daily 10/12/21   May KLARISSA Hernandez CNP   cloNIDine (CATAPRES) 0.3 MG tablet Take 1 tablet by mouth 3 times daily 7/12/21 3/9/23  May KLARISSA Hernandez CNP   lisinopril (PRINIVIL;ZESTRIL) 40 MG tablet Take 40 mg by mouth daily  Patient not taking: Reported on 4/28/2022    Historical Provider, MD   insulin regular human (HUMULIN R U-500 KWIKPEN) 500 UNIT/ML SOPN concentrated injection pen Inject 110 Units into the skin every morning (before breakfast)    Historical Provider, MD   insulin regular human (HUMULIN R U-500 KWIKPEN) 500 UNIT/ML SOPN concentrated injection pen Inject 25 Units into the skin daily (before lunch)    Historical Provider, MD   insulin regular human (HUMULIN R U-500 KWIKPEN) 500 UNIT/ML SOPN concentrated injection pen Inject 90 Units into the skin Daily with supper    Historical Provider, MD   Cholecalciferol (VITAMIN D3) 50 MCG (2000 UT) CAPS TAKE 1 CAPSULE BY MOUTH EVERY DAY 11/24/20   Historical Provider, MD   acetaminophen (TYLENOL 8 HOUR) 650 MG extended release tablet Take 1 tablet by mouth 2 times daily as needed for Pain 2/10/21 4/28/22  Silvino Ledesma PA-C   Handicap Placard MISC by Does not apply route 9/16/19   Silvino Ledesma PA-C Multiple Vitamins-Minerals (THERAGRAN-M ADVANCED 50 PLUS) TABS Take 1 tablet by mouth daily 1/14/19   Chrissie Coats PA-C   docusate sodium (COLACE) 100 MG capsule Take 100 mg by mouth 2 times daily    Historical Provider, MD   lidocaine (LIDODERM) 5 % Place 1 patch onto the skin daily 12 hours on, 12 hours off. Historical Provider, MD   Compression Stockings MISC Provide compressions stocking covered by insurance, 20 -30 6/4/18   Chrissie Coats PA-C   [facility]. Devices (COMMODE BEDSIDE) [facility] Provide bedside commode covered by insurance 3/21/18   Chrissie Coats PA-C   aspirin 81 MG chewable tablet Take 81 mg by mouth daily 2/9/18   Historical Provider, MD   ARTIFICIAL TEARS 0.2-0.2-1 % SOLN  2/9/18   Historical Provider, MD   Ascorbic Acid (VITAMIN C) 1000 MG tablet Take 1,000 mg by mouth daily 2/9/18   Historical Provider, MD   latanoprost (XALATAN) 0.005 % ophthalmic solution Place 1 drop into the right eye daily  12/4/17   Historical Provider, MD   Misc. Devices (TRANSFER BENCH) MISC Use shower transfer bench with back,  every time patient gets in/or out of the shower. Please dispense insurance approved shower transfer bench. 8/15/17   Zachariah Mendiola MD   Crissie Poles LANCETS 52E MISC  10/5/16   Historical Provider, MD   ONE TOUCH ULTRA TEST strip  1/24/16   Historical Provider, MD   Blood Pressure Monitoring (B-D ASSURE BPM/AUTO WRIST CUFF) MISC As directed. Labile hypertension.  1/25/16   Zachariah Mendiola MD   B-D ULTRAFINE III SHORT PEN 31G X 8 MM MISC  3/15/14   Historical Provider, MD       Future Appointments   Date Time Provider Jack Shah   6/13/2022 10:30 AM KLARISSA Schafer - CNP [facility]

## 2022-05-25 ENCOUNTER — TELEPHONE (OUTPATIENT)
Dept: PRIMARY CARE CLINIC | Age: 62
End: 2022-05-25

## 2022-05-25 NOTE — TELEPHONE ENCOUNTER
Patient's blood pressure reading today is 178/79, which is trending downward from prior reading reported.

## 2022-05-25 NOTE — TELEPHONE ENCOUNTER
Patient home health care nurse (tana) called to inform provider that patient BP was 223/108 before taking medication. Home health nurse stated that patient would repeat BP in 2 hours and has called office back with an updated report.

## 2022-05-31 ENCOUNTER — CARE COORDINATION (OUTPATIENT)
Dept: CARE COORDINATION | Age: 62
End: 2022-05-31

## 2022-05-31 NOTE — CARE COORDINATION
Ambulatory Care Coordination Note  5/31/2022  CM Risk Score: 5  Charlson 10 Year Mortality Risk Score: 100%     ACC: Ingrid Rito    Summary Note: Pura Pierre reports she puts her food in a  to puree it. She states she puts the potatoes, vegetables and meat together. She has been eating yogurt for her snacks. She reports her mouth is feeling better. She states she took her upper plate out today. She reports her fasting blood sugar this morning as 163. She states her blood sugars have stayed in the 100's,  She reports her B/P this morning was 189/87 P 67 and this afternoon was 140/90 P 60. Her brother takes her to all her appointments. He works 2nd shift. She states he gets home at 2 - 3 in the morning. She has not used her medical cab. She states she is thinking about using this service. She states she will have her \"nurse\" help her to arrange for this service. Plan:  Call Pura Pierre on Monday to f/u on blood sugars, B/P and her diet. If Pura Pierre has no needs CM will graduate from [address] [place] [address]. Diabetes Assessment    Medic Alert ID: No  Meal Planning: Carb counting, Avoidance of concentrated sweets, Other   How often do you test your blood sugar?: Meals, Bedtime   Do you have barriers with adherence to non-pharmacologic self-management interventions?  (Nutrition/Exercise/Self-Monitoring): Yes   Have you ever had to go to the ED for symptoms of low blood sugar?: Yes   What is the date of your last ED visit for low blood sugar?: 4/19/22       No patient-reported symptoms   Do you have hyperglycemia symptoms?: No   Do you have hypoglycemia symptoms?: No   Last Blood Sugar Value: 163   Blood Sugar Monitoring Regimen: Before Meals, At Bedtime   Blood Sugar Trends: No Change          Lab Results     None          Care Coordination Interventions    Program Enrollment: Complex Care  Referral from Primary Care Provider: No  Suggested Interventions and Community Resources  Diabetes Education: Completed  Zone Management Tools: Completed  Other Services or Interventions: An adult sitter stays with Hilario Ponce while her brother is at work         Goals Addressed    None         Prior to Admission medications    Medication Sig Start Date End Date Taking?  Authorizing Provider   furosemide (LASIX) 80 MG tablet Take 1 tablet by mouth See Admin Instructions 80 mg in AM 4/28/22   KLARISSA Gonzalez CNP   carvedilol (COREG) 12.5 MG tablet Take 1 tablet by mouth 2 times daily (with meals) 4/28/22   KLARISSA Gonzalez CNP   amLODIPine (NORVASC) 5 MG tablet TAKE 1 TABLET BY MOUTH EVERY DAY  Patient taking differently: 10 mg  4/22/22   KLARISSA Gonzalez CNP   Calcium Carbonate Antacid (TUMS ULTRA 1000) 1000 MG CHEW Take 1,000 mg by mouth    Historical Provider, MD   acetaminophen (TYLENOL) 500 MG tablet Take 500 mg by mouth every 6 hours as needed for Pain    Historical Provider, MD   REPATHA 140 MG/ML SOSY 1 ML EVERY 15 DAYS SUBCUTANEOUS 90 DAYS 2/1/22   Historical Provider, MD   isosorbide-hydrALAZINE (BIDIL) 20-37.5 MG per tablet Take 1 tablet by mouth 3 times daily 3/9/22   Viji Thomason MD   fenofibrate micronized (LOFIBRA) 67 MG capsule TAKE 1 CAPSULE BY MOUTH EVERY DAY IN THE MORNING BEFORE BREAKFAST 1/3/22   [facility]   NIFEdipine (ADALAT CC) 90 MG extended release tablet Take 90 mg by mouth daily    Historical Provider, MD   famotidine (PEPCID) 20 MG tablet TAKE 1 TABLET BY MOUTH EVERY DAY 12/2/21   KLARISSA Gonzalez CNP   atorvastatin (LIPITOR) 80 MG tablet Take 1 tablet by mouth daily 10/12/21   [facility], APRN - CNP   cloNIDine (CATAPRES) 0.3 MG tablet Take 1 tablet by mouth 3 times daily 7/12/21 3/9/23  [facility] CNP   lisinopril (PRINIVIL;ZESTRIL) 40 MG tablet Take 40 mg by mouth daily  Patient not taking: Reported on 4/28/2022    Historical Provider, MD   insulin regular human (HUMULIN R U-500 KWIKPEN) 500 UNIT/ML SOPN concentrated injection pen Inject 110 Units into the skin every morning (before breakfast)    Historical Provider, MD   insulin regular human (HUMULIN R U-500 KWIKPEN) 500 UNIT/ML SOPN concentrated injection pen Inject 25 Units into the skin daily (before lunch)    Historical Provider, MD   insulin regular human (HUMULIN R U-500 KWIKPEN) 500 UNIT/ML SOPN concentrated injection pen Inject 90 Units into the skin Daily with supper    Historical Provider, MD   Cholecalciferol (VITAMIN D3) 50 MCG (2000 UT) CAPS TAKE 1 CAPSULE BY MOUTH EVERY DAY 11/24/20   Historical Provider, MD   acetaminophen (TYLENOL 8 HOUR) 650 MG extended release tablet Take 1 tablet by mouth 2 times daily as needed for Pain 2/10/21 4/28/22  Mariam Cantu PA-C   Handicap Placard MISC by Does not apply route 9/16/19   Mariam Cantu PA-C   Multiple Vitamins-Minerals [facility] SYSTEM ADVANCED 50 PLUS) TABS Take 1 tablet by mouth daily 1/14/19   Mariam Cantu PA-C   docusate sodium (COLACE) 100 MG capsule Take 100 mg by mouth 2 times daily    Historical Provider, MD   lidocaine (LIDODERM) 5 % Place 1 patch onto the skin daily 12 hours on, 12 hours off. Historical Provider, MD   Compression Stockings MISC Provide compressions stocking covered by insurance, 20 -30 6/4/18   CHARU [employer]. Devices (COMMODE BEDSIDE) [facility] Provide bedside commode covered by insurance 3/21/18   Mariam Cantu PA-C   aspirin 81 MG chewable tablet Take 81 mg by mouth daily 2/9/18   Historical Provider, MD   ARTIFICIAL TEARS 0.2-0.2-1 % SOLN  2/9/18   Historical Provider, MD   Ascorbic Acid (VITAMIN C) 1000 MG tablet Take 1,000 mg by mouth daily 2/9/18   Historical Provider, MD   latanoprost (XALATAN) 0.005 % ophthalmic solution Place 1 drop into the right eye daily  12/4/17   Historical Provider, MD   Misc. Devices (TRANSFER BENCH) MISC Use shower transfer bench with back,  every time patient gets in/or out of the shower. Please dispense insurance approved shower transfer bench.  8/15/17   Carlota Jordan MD Mannie Corley Noe LANCETS 12B MISC  10/5/16   Historical Provider, MD   ONE TOUCH ULTRA TEST strip  1/24/16   Historical Provider, MD   Blood Pressure Monitoring (B-D ASSURE BPM/AUTO WRIST CUFF) MISC As directed. Labile hypertension.  1/25/16   Jeanette Mendez MD   B-D ULTRAFINE III SHORT PEN 31G X 8 MM MISC  3/15/14   Historical Provider, MD       Future Appointments   Date Time Provider Jack Shah   6/13/2022 10:30 AM KLARISSA Bellamy - CNP [facility]

## 2022-06-13 ENCOUNTER — TELEPHONE (OUTPATIENT)
Dept: ONCOLOGY | Age: 62
End: 2022-06-13

## 2022-06-13 ENCOUNTER — OFFICE VISIT (OUTPATIENT)
Dept: PRIMARY CARE CLINIC | Age: 62
End: 2022-06-13
Payer: MEDICARE

## 2022-06-13 VITALS
OXYGEN SATURATION: 99 % | DIASTOLIC BLOOD PRESSURE: 81 MMHG | BODY MASS INDEX: 44.39 KG/M2 | SYSTOLIC BLOOD PRESSURE: 184 MMHG | HEART RATE: 67 BPM | WEIGHT: 275 LBS

## 2022-06-13 DIAGNOSIS — N18.4 CKD STAGE 4 SECONDARY TO HYPERTENSION (HCC): ICD-10-CM

## 2022-06-13 DIAGNOSIS — Z79.4 TYPE 2 DIABETES MELLITUS WITH DIABETIC POLYNEUROPATHY, WITH LONG-TERM CURRENT USE OF INSULIN (HCC): ICD-10-CM

## 2022-06-13 DIAGNOSIS — E11.42 TYPE 2 DIABETES MELLITUS WITH DIABETIC POLYNEUROPATHY, WITH LONG-TERM CURRENT USE OF INSULIN (HCC): ICD-10-CM

## 2022-06-13 DIAGNOSIS — I10 ESSENTIAL HYPERTENSION: Primary | ICD-10-CM

## 2022-06-13 DIAGNOSIS — I12.9 CKD STAGE 4 SECONDARY TO HYPERTENSION (HCC): ICD-10-CM

## 2022-06-13 PROCEDURE — 3046F HEMOGLOBIN A1C LEVEL >9.0%: CPT | Performed by: NURSE PRACTITIONER

## 2022-06-13 PROCEDURE — G8427 DOCREV CUR MEDS BY ELIG CLIN: HCPCS | Performed by: NURSE PRACTITIONER

## 2022-06-13 PROCEDURE — 3017F COLORECTAL CA SCREEN DOC REV: CPT | Performed by: NURSE PRACTITIONER

## 2022-06-13 PROCEDURE — 1036F TOBACCO NON-USER: CPT | Performed by: NURSE PRACTITIONER

## 2022-06-13 PROCEDURE — 99213 OFFICE O/P EST LOW 20 MIN: CPT | Performed by: NURSE PRACTITIONER

## 2022-06-13 PROCEDURE — G8417 CALC BMI ABV UP PARAM F/U: HCPCS | Performed by: NURSE PRACTITIONER

## 2022-06-13 PROCEDURE — 2022F DILAT RTA XM EVC RTNOPTHY: CPT | Performed by: NURSE PRACTITIONER

## 2022-06-13 RX ORDER — HYDRALAZINE HYDROCHLORIDE 100 MG/1
100 TABLET, FILM COATED ORAL 3 TIMES DAILY
COMMUNITY
Start: 2022-06-10 | End: 2022-10-17 | Stop reason: ALTCHOICE

## 2022-06-13 RX ORDER — CARVEDILOL 12.5 MG/1
25 TABLET ORAL 2 TIMES DAILY
Qty: 60 TABLET | Refills: 0 | Status: SHIPPED
Start: 2022-06-13 | End: 2022-08-10 | Stop reason: SDUPTHER

## 2022-06-13 RX ORDER — LOSARTAN POTASSIUM 100 MG/1
100 TABLET ORAL DAILY
COMMUNITY
End: 2022-08-10 | Stop reason: SDUPTHER

## 2022-06-13 RX ORDER — FUROSEMIDE 80 MG
80 TABLET ORAL 2 TIMES DAILY
Qty: 60 TABLET | Refills: 0 | Status: SHIPPED
Start: 2022-06-13 | End: 2022-08-10 | Stop reason: SDUPTHER

## 2022-06-13 ASSESSMENT — ENCOUNTER SYMPTOMS
NAUSEA: 0
WHEEZING: 0
BACK PAIN: 0
TROUBLE SWALLOWING: 0
SHORTNESS OF BREATH: 1
BLURRED VISION: 1
COUGH: 0
VOMITING: 0
BLOOD IN STOOL: 0
CONSTIPATION: 0
DIARRHEA: 0

## 2022-06-13 NOTE — PROGRESS NOTES
[facility]  [id number] [facility] [address]  Dept: [phone]  Dept Fax: [phone]    Patient Care Team:  KLARISSA Victoria CNP as PCP - General (Family Medicine)  KLARISSA Victoria CNP as PCP - [facility] Empaneled Provider  Jassi Hernandez as Consulting Physician (Endocrinology)  KLARISSA Kelley CNP as Nurse Practitioner (Neurology)  Jes Olsen MD as Consulting Physician (Ophthalmology)  Idris Mckinley as Ambulatory Care Manager    2022     Arthur Liu (:  [birth year])HW a 64 y.o. female, here for evaluation of the following medical concerns:   Chief Complaint   Patient presents with    Diabetes     6w follow-up    Hypertension       Ms. Kash Eduardo is here today to follow-up for diabetes hypertension and chronic conditions. States she saw Dr Remy Guillermo for her diabetes, has f/u today at 1 PM. Using dexcom device for glucose monitoring    Had a visit with Nephrology last month, did blood work and an [address] of her kidney. Carvedilol 25 BID, stopped Norvasc and now on losartan 100 mg  Switched her lasix to 80 mg and morning and 80 mg in PM  Also on clonidine to 0.3mg three times a day    Follows with eye doctor, goes back on . Used to get injections. States she bleeding behind the eyes has stopped  Needs a new doctor due to her insurance    Is to see hematology along with rheumatology. Takes U-500 insulin before meals  Also on an injectable twice monthly for cholesterol    Hx of uterine cancer, had a hysterectomy. States she was told she did not have to follow up with GYN anymore. Follows with cardiology yearly, through  [address]        Hypertension  This is a chronic problem. The problem is unchanged. The problem is controlled. Associated symptoms include blurred vision, peripheral edema, shortness of breath (on exertion) and sweats.  Pertinent negatives include no chest pain, headaches or neck pain. Risk factors for coronary artery disease include obesity, post-menopausal state and diabetes mellitus. Past treatments include diuretics, ACE inhibitors and beta blockers. Hypertensive end-organ damage includes kidney disease and CVA. Diabetes  She presents for her follow-up diabetic visit. She has type 2 diabetes mellitus. Her disease course has been stable. Hypoglycemia symptoms include sweats. Pertinent negatives for hypoglycemia include no dizziness, headaches or tremors. Associated symptoms include blurred vision. Pertinent negatives for diabetes include no chest pain. There are no hypoglycemic complications. Symptoms are stable. Diabetic complications include a CVA. Current diabetic treatment includes insulin injections. .    Review of Systems   Constitutional: Negative for chills and fever. HENT: Negative for congestion and trouble swallowing. Eyes: Positive for blurred vision. Respiratory: Positive for shortness of breath (on exertion). Negative for cough and wheezing. Cardiovascular: Positive for leg swelling. Negative for chest pain. Gastrointestinal: Negative for blood in stool, constipation, diarrhea, nausea and vomiting. Genitourinary: Negative for difficulty urinating, dysuria, frequency and urgency. Musculoskeletal: Positive for arthralgias. Negative for back pain, myalgias and neck pain. Neurological: Negative for dizziness, tremors, syncope and headaches. Prior to Visit Medications    Medication Sig Taking?  Authorizing Provider   hydrALAZINE (APRESOLINE) 100 MG tablet Take 100 mg by mouth 3 times daily Yes Historical Provider, MD   carvedilol (COREG) 12.5 MG tablet Take 2 tablets by mouth 2 times daily Yes KLARISSA Andrew CNP   furosemide (LASIX) 80 MG tablet Take 1 tablet by mouth 2 times daily Yes KLARISSA Andrew CNP   losartan (COZAAR) 100 MG tablet Take 100 mg by mouth daily Yes Historical Provider, MD   Calcium Carbonate Antacid (TUMS ULTRA 1000) 1000 MG CHEW Take 1,000 mg by mouth Yes Historical Provider, MD   acetaminophen (TYLENOL) 500 MG tablet Take 500 mg by mouth every 6 hours as needed for Pain Yes Historical Provider, MD   REPATHA 140 MG/ML SOSY 1 ML EVERY 15 DAYS SUBCUTANEOUS 90 DAYS Yes Historical Provider, MD   isosorbide-hydrALAZINE (BIDIL) 20-37.5 MG per tablet Take 1 tablet by mouth 3 times daily Yes Germaine Palacios MD   fenofibrate micronized (LOFIBRA) 67 MG capsule TAKE 1 CAPSULE BY MOUTH EVERY DAY IN THE MORNING BEFORE BREAKFAST Yes Mortimer Singleton, APRN - CNP   NIFEdipine (ADALAT CC) 90 MG extended release tablet Take 90 mg by mouth daily Yes Historical Provider, MD   famotidine (PEPCID) 20 MG tablet TAKE 1 TABLET BY MOUTH EVERY DAY Yes Mortimer Singleton, APRN - CNP   atorvastatin (LIPITOR) 80 MG tablet Take 1 tablet by mouth daily Yes Mortimer Singleton, APRN - CNP   cloNIDine (CATAPRES) 0.3 MG tablet Take 1 tablet by mouth 3 times daily Yes Mortimer Singleton, APRN - CNP   insulin regular human (HUMULIN R U-500 KWIKPEN) 500 UNIT/ML SOPN concentrated injection pen Inject 110 Units into the skin every morning (before breakfast) Yes Historical Provider, MD   insulin regular human (HUMULIN R U-500 KWIKPEN) 500 UNIT/ML SOPN concentrated injection pen Inject 25 Units into the skin daily (before lunch) Yes Historical Provider, MD   insulin regular human (HUMULIN R U-500 KWIKPEN) 500 UNIT/ML SOPN concentrated injection pen Inject 90 Units into the skin Daily with supper Yes Historical Provider, MD   Cholecalciferol (VITAMIN D3) 50 MCG (2000 UT) CAPS TAKE 1 CAPSULE BY MOUTH EVERY DAY Yes Historical Provider, MD   acetaminophen (TYLENOL 8 HOUR) 650 MG extended release tablet Take 1 tablet by mouth 2 times daily as needed for Pain Yes aMc Mujica PA-C   Handicap Placard MISC by Does not apply route Yes Mac Mujica PA-C   Multiple Vitamins-Minerals (THERAGRAN-M ADVANCED 50 PLUS) TABS Take 1 tablet by mouth daily Yes Chrissie Coats PA-C   docusate sodium (COLACE) 100 MG capsule Take 100 mg by mouth 2 times daily Yes Historical Provider, MD   lidocaine (LIDODERM) 5 % Place 1 patch onto the skin daily 12 hours on, 12 hours off. Yes Historical Provider, MD   Compression Stockings MISC Provide compressions stocking covered by insurance, 20 -30 Yes Chrissie Coats PA-C   Mis. Devices (COMMODE BEDSIDE) [facility] Provide bedside commode covered by insurance Yes Chrissie Coats PA-C   aspirin 81 MG chewable tablet Take 81 mg by mouth daily Yes Historical Provider, MD   ARTIFICIAL TEARS 0.2-0.2-1 % SOLN  Yes Historical Provider, MD   Ascorbic Acid (VITAMIN C) 1000 MG tablet Take 1,000 mg by mouth daily Yes Historical Provider, MD   latanoprost (XALATAN) 0.005 % ophthalmic solution Place 1 drop into the right eye daily  Yes Historical Provider, MD   Misc. Devices (TRANSFER BENCH) MISC Use shower transfer bench with back,  every time patient gets in/or out of the shower. Please dispense insurance approved shower transfer bench. Yes Zachariah Mendiola MD   [facility] LANCETS 53N MISC  Yes Historical Provider, MD   [address] TEST strip  Yes Historical Provider, MD   Blood Pressure Monitoring (B-D ASSURE BPM/AUTO WRIST CUFF) MISC As directed. Labile hypertension.  Yes Zachariah Mendiola MD   B-D ULTRAFINE III SHORT PEN 31G X 8 MM MISC  Yes Historical Provider, MD        Social History     Tobacco Use    Smoking status: Former Smoker     Packs/day: 0.50     Years: 20.00     Pack years: 10.00     Start date: 1973     Quit date: 2000     Years since quittin.5    Smokeless tobacco: Never Used   Substance Use Topics    Alcohol use: No        Vitals:    22 1031 22 1120   BP: (!) 163/81 (!) 184/81   Site: Right Upper Arm    Position: Sitting    Pulse: 73 67   SpO2: 99%    Weight: 275 lb (124.7 kg)      Estimated body mass index is 44.39 kg/m² as calculated from the following:    Height as of 3/9/22: 5' 6\" (1.676 m). Weight as of this encounter: 275 lb (124.7 kg). DIAGNOSTIC FINDINGS:  CBC:  Lab Results   Component Value Date    WBC 5.4 2015    HGB 14.2 2015     2015       BMP:    Lab Results   Component Value Date     2022    K 4.5 2022     2022    CO2 25 2022    BUN 44 2022    CREATININE 2.74 2022    CREATININE 1.7 2020    GLUCOSE 208 2022       HEMOGLOBIN A1C:   Lab Results   Component Value Date    LABA1C 10.4 2022       FASTING LIPID PANEL:  Lab Results   Component Value Date    CHOL 291 (H) 2015    HDL 42 2015    TRIG 502 (H) 2015       Physical Exam  Vitals reviewed. Constitutional:       Appearance: Normal appearance. She is well-developed and well-groomed. She is obese. HENT:      Head: Normocephalic and atraumatic. Right Ear: External ear normal.      Left Ear: External ear normal.      Nose: Nose normal.   Eyes:      General: Lids are normal. No scleral icterus. Conjunctiva/sclera: Conjunctivae normal.      Pupils: Pupils are equal, round, and reactive to light. Cardiovascular:      Rate and Rhythm: Normal rate and regular rhythm. Heart sounds: Normal heart sounds. Pulmonary:      Effort: Pulmonary effort is normal.      Breath sounds: Normal breath sounds. No decreased air movement. Abdominal:      General: Bowel sounds are normal.      Palpations: Abdomen is soft. There is no mass. Tenderness: There is no abdominal tenderness. Musculoskeletal:      Cervical back: Normal range of motion and neck supple. Right lower le+ Pitting Edema present. Left lower le+ Pitting Edema present. Skin:     General: Skin is warm and dry. Neurological:      Mental Status: She is alert and oriented to person, place, and time. Gait: Gait abnormal.   Psychiatric:         Behavior: Behavior is cooperative. Thought Content:  Thought content normal.         Judgment: Judgment normal.         ASSESSMENT     Diagnosis Orders   1. Essential hypertension  carvedilol (COREG) 12.5 MG tablet   2. Type 2 diabetes mellitus with diabetic polyneuropathy, with long-term current use of insulin ([facility] [address]     3. CKD stage 4 secondary to hypertension (HCC)            PLAN:  Orders Placed This Encounter   Medications    carvedilol (COREG) 12.5 MG tablet     Sig: Take 2 tablets by mouth 2 times daily     Dispense:  60 tablet     Refill:  0    furosemide (LASIX) 80 MG tablet     Sig: Take 1 tablet by mouth 2 times daily     Dispense:  60 tablet     Refill:  0         1. Blood pressure remains uncontrolled. Was referred for potential dialysis. 2. Patient provided with contact information for hematology, referral was already placed to nephrology. 3. We discussed small meals with bits of protein to avoid hypoglycemia today. 07/11/2022 Office Visit Nephrology KLARISSA Hunt-CNP    [address], [address] [place] [address]   [phone] [address]   [phone] (Fax)       07/25/2022 Office Visit Rheumatology Emily Daniel MD    [address] [place] [address], [address] [place] [address], [address] [place] [address]   [phone] [address]   [phone] (Fax)             FOLLOW UP AND INSTRUCTIONS:  Return in about 4 months (around 10/13/2022) for Diabetes, HTN. · Ellis Martines received counseling on the following healthy behaviors:nutrition and medication adherence    · Discussed use, benefit, and side effects of prescribed medications. Barriers to  medication compliance addressed. All patient questions answered. Pt  verbalized understanding of all instructions given. · Patient given educational materials - see patient instructions      · Patient advised to contact scheduling offices for any referrals or imaging orders  placed today if they have not been contacted in 48 hours. Return in about 4 months (around 10/13/2022) for Diabetes, HTN.     An electronic signature was used to authenticate this note. --KLARISSA Ribera CNP on 6/13/2022 at 12:30 PM  Visit Information    Have you changed or started any medications since your last visit including any over-the-counter medicines, vitamins, or herbal medicines? no   Are you having any side effects from any of your medications? -  no  Have you stopped taking any of your medications? Is so, why? -  no    Have you seen any other physician or provider since your last visit? Yes - Records Obtained  Have you had any other diagnostic tests since your last visit? No  Have you been seen in the emergency room and/or had an admission to a hospital since we last saw you? No  Have you had your routine dental cleaning in the past 6 months? no    Have you activated your [employer] account? If not, what are your barriers?  Yes     Patient Care Team:  KLARISSA Ribera CNP as PCP - General (Family Medicine)  KLARISSA [facility] CNP as PCP [facility]  Cyntha Klinefelter as Consulting Physician (Endocrinology)  KLARISSA Guzman CNP as Nurse Practitioner (Neurology)  Karyn Noriega MD as Consulting Physician (Ophthalmology)  Celia Buck as Ambulatory Care Manager    Medical History Review  Past Medical, Family, and Social History reviewed and does not contribute to the patient presenting condition    Health Maintenance   Topic Date Due    Annual Wellness Visit (AWV)  Never done    DTaP/Tdap/Td vaccine (1 - Tdap) Never done    Shingles vaccine (1 of 2) Never done    Diabetic foot exam  03/29/2017    Lipids  05/20/2020    Pneumococcal 0-64 years Vaccine (2 - PCV) 02/10/2021    Breast cancer screen  06/24/2021    Diabetic retinal exam  01/20/2022    COVID-19 Vaccine (3 - Booster for Abhijeet Lords series) 03/03/2022    A1C test (Diabetic or Prediabetic)  08/02/2022    Depression Screen  01/17/2023    Colorectal Cancer Screen  06/25/2028    Flu vaccine  Completed    Hepatitis C screen  Completed    HIV screen  Completed    Hepatitis A vaccine  Aged Out    Hib vaccine  Aged Out    Meningococcal (ACWY) vaccine  Aged Out

## 2022-06-16 ENCOUNTER — CARE COORDINATION (OUTPATIENT)
Dept: CARE COORDINATION | Age: 62
End: 2022-06-16

## 2022-06-16 RX ORDER — AMLODIPINE BESYLATE 5 MG/1
5 TABLET ORAL DAILY
COMMUNITY
End: 2022-09-01 | Stop reason: SDUPTHER

## 2022-06-16 RX ORDER — EVOLOCUMAB 140 MG/ML
140 INJECTION, SOLUTION SUBCUTANEOUS
COMMUNITY

## 2022-06-16 ASSESSMENT — SOCIAL DETERMINANTS OF HEALTH (SDOH)
DO YOU BELONG TO ANY CLUBS OR ORGANIZATIONS SUCH AS CHURCH GROUPS UNIONS, FRATERNAL OR ATHLETIC GROUPS, OR SCHOOL GROUPS?: NO
IN A TYPICAL WEEK, HOW MANY TIMES DO YOU TALK ON THE PHONE WITH FAMILY, FRIENDS, OR NEIGHBORS?: MORE THAN THREE TIMES A WEEK
HOW OFTEN DO YOU ATTEND CHURCH OR RELIGIOUS SERVICES?: NEVER
HOW OFTEN DO YOU ATTENT MEETINGS OF THE CLUB OR ORGANIZATION YOU BELONG TO?: NEVER

## 2022-06-16 NOTE — CARE COORDINATION
Ambulatory Care Coordination Note  6/16/2022  CM Risk Score: 5  Charlson 10 Year Mortality Risk Score: 100%     ACC: Luz Ventura    Summary Note: He Barraza reports today that she has not made an appointment for eye care to date. She states she can not read the list provided to her. CM suggested she have her adult sister write the offices and phone numbers larger for her so she can call to get an appointment scheduled. CM stressed how important this is. He Barraza expressed an understanding and was agreeable. She reports she goes back to the dentist on Monday to have 3 teeth removed from her lower jaw. She states she has started eating some soft solid foods. She reports she has lost 12 pounds. Medications were reviewed and updated. She has had some recent medication changes. She reports no further needs at this time. CM will keep her on CC panel until she has made and kept an appointment for eye care. CM discussed her assisted with He Barraza. She expressed gratitude for the care. She shared Emmanuel Barillas will be calling her in the near future. Plan:  F/U on ophthalmology in 2 weeks. Diabetes Assessment    Medic Alert ID: No  Meal Planning: Carb counting, Avoidance of concentrated sweets, Other   How often do you test your blood sugar?: Meals, Bedtime   Do you have barriers with adherence to non-pharmacologic self-management interventions?  (Nutrition/Exercise/Self-Monitoring): Yes   Have you ever had to go to the ED for symptoms of low blood sugar?: Yes   What is the date of your last ED visit for low blood sugar?: 4/19/22       No patient-reported symptoms   Do you have hyperglycemia symptoms?: No   Do you have hypoglycemia symptoms?: No   Last Blood Sugar Value: 130   Blood Sugar Trends: Fluctuating          Lab Results     None          Care Coordination Interventions    Program Enrollment: Complex Care  Referral from Primary Care Provider: No  Suggested Interventions and Community Resources  Diabetes Education: Completed  Zone Management Tools: Completed  Other Services or Interventions: An adult sitter stays with Hua Pathak while her brother is at work         Goals Addressed    None         Prior to Admission medications    Medication Sig Start Date End Date Taking?  Authorizing Provider   amLODIPine (NORVASC) 5 MG tablet Take 5 mg by mouth daily   Yes Historical Provider, MD   Evolocumab (REPATHA) 140 MG/ML SOSY Inject 140 mg into the skin Every 15 days   Yes Historical Provider, MD   hydrALAZINE (APRESOLINE) 100 MG tablet Take 100 mg by mouth 3 times daily 6/10/22  Yes Historical Provider, MD   carvedilol (COREG) 12.5 MG tablet Take 2 tablets by mouth 2 times daily 6/13/22  Yes KLARISSA Bernstein CNP   furosemide (LASIX) 80 MG tablet Take 1 tablet by mouth 2 times daily 6/13/22  Yes KLARISSA Bernstein CNP   losartan (COZAAR) 100 MG tablet Take 100 mg by mouth daily   Yes Historical Provider, MD   Calcium Carbonate Antacid (TUMS ULTRA 1000) 1000 MG CHEW Take 1,000 mg by mouth   Yes Historical Provider, MD   acetaminophen (TYLENOL) 500 MG tablet Take 500 mg by mouth every 6 hours as needed for Pain   Yes Historical Provider, MD   isosorbide-hydrALAZINE (BIDIL) 20-37.5 MG per tablet Take 1 tablet by mouth 3 times daily 3/9/22  Yes Nyla Carney MD   fenofibrate micronized (LOFIBRA) 67 MG capsule TAKE 1 CAPSULE BY MOUTH EVERY DAY IN THE MORNING BEFORE BREAKFAST 1/3/22  Yes KLARISSA Bernstein CNP   famotidine (PEPCID) 20 MG tablet TAKE 1 TABLET BY MOUTH EVERY DAY 12/2/21  Yes KLARISSA Bernstein CNP   atorvastatin (LIPITOR) 80 MG tablet Take 1 tablet by mouth daily 10/12/21  Yes KLARISSA Bernstein CNP   cloNIDine (CATAPRES) 0.3 MG tablet Take 1 tablet by mouth 3 times daily 7/12/21 3/9/23 Yes KLARISSA Bernstein CNP   insulin regular human (HUMULIN R U-500 KWIKPEN) 500 UNIT/ML SOPN concentrated injection pen Inject 50 Units into the skin every morning (before breakfast)    Yes Historical compressions stocking covered by insurance, 20 -30 6/4/18   Noah Florian PA-C       Future Appointments   Date Time Provider Jack Alyssa   6/28/2022 11:30 AM Tomasa Wallace MD [address] [place] [address]   10/17/2022 10:30 AM KLARISSA Bellamy - CNP [facility]

## 2022-06-17 DIAGNOSIS — I10 ESSENTIAL HYPERTENSION: ICD-10-CM

## 2022-06-19 NOTE — TELEPHONE ENCOUNTER
Health Maintenance   Topic Date Due    Annual Wellness Visit (AWV)  Never done    DTaP/Tdap/Td vaccine (1 - Tdap) Never done    Shingles vaccine (1 of 2) Never done    Diabetic foot exam  03/29/2017    Lipids  05/20/2020    Pneumococcal 0-64 years Vaccine (2 - PCV) 02/10/2021    Breast cancer screen  06/24/2021    Diabetic retinal exam  01/20/2022    COVID-19 Vaccine (3 - Booster for Moderna series) 03/03/2022    A1C test (Diabetic or Prediabetic)  08/02/2022    Depression Screen  01/17/2023    Colorectal Cancer Screen  06/25/2028    Flu vaccine  Completed    Hepatitis C screen  Completed    HIV screen  Completed    Hepatitis A vaccine  Aged Out    Hib vaccine  Aged Out    Meningococcal (ACWY) vaccine  Aged Out             (applicable per patient's age: Cancer Screenings, Depression Screening, Fall Risk Screening, Immunizations)    Hemoglobin A1C (%)   Date Value   01/17/2022 10.4   07/12/2021 10.3   02/10/2021 11.0     Microalb/Crt.  Ratio (mcg/mg creat)   Date Value   04/04/2015 3,460     LDL Cholesterol (mg/dL)   Date Value   04/04/2015          AST (U/L)   Date Value   04/04/2015 19     ALT (U/L)   Date Value   04/04/2015 24     BUN (mg/dL)   Date Value   05/16/2022 44      (goal A1C is < 7)   (goal LDL is <100) need 30-50% reduction from baseline     BP Readings from Last 3 Encounters:   06/13/22 (!) 184/81   04/28/22 (!) 162/76   03/09/22 (!) 160/74    (goal /80)      All Future Testing planned in [facility]:  Lab Frequency Next Occurrence   EDITH Digital Screen Bilateral [[id number]] Once 11/26/2021   Lipid, Fasting Once 11/19/2021   US PELVIS COMPLETE Once 10/27/2021   POCT glycosylated hemoglobin (Hb A1C) Once 06/11/2022   Baseline Diagnostic Sleep Study Once 04/28/2022       Next Visit Date:  Future Appointments   Date Time Provider Jack Shah   6/28/2022 11:30 AM Amanda Grande MD [address] [place] [address] 22   10/17/2022 10:30 AM KLARISSA Denise - CNP [facility] Patient Active Problem List:     Malignant neoplasm of uterus (HCC)     Acid reflux     Heart murmur     Class 3 severe obesity due to excess calories with serious comorbidity and body mass index (BMI) of 40.0 to 44.9 in adult [facility])     Type 2 diabetes mellitus treated with insulin (HCC)     Microalbuminuria     Noncompliance with treatment     Hypokalemia     Lymphedema of leg     Primary osteoarthritis of both knees     Bad memory     Unsteady gait     Calculus of gallbladder     Weakness of left side of body     AF (amaurosis fugax)     Chronic kidney disease     Abnormal liver enzymes     Ataxic aphasia     Herpes simplex type 2 infection     Temporary cerebral vascular dysfunction     Moderate or severe vision impairment, both eyes     Familial hyperlipidemia     Essential hypertension     Occipital cerebral infarction [facility])     Drug overdose     Hypersomnolence     Ischemic stroke ([address]     Right hemiparesis ([address]     Leg wound, right, initial encounter

## 2022-06-20 ENCOUNTER — TELEPHONE (OUTPATIENT)
Dept: FAMILY MEDICINE CLINIC | Age: 62
End: 2022-06-20

## 2022-06-20 NOTE — TELEPHONE ENCOUNTER
Rannie Krabbe was contacted as part of mammography outreach. Mammography appointment has been scheduled.     Twyla

## 2022-06-23 RX ORDER — HYDRALAZINE HYDROCHLORIDE 50 MG/1
TABLET, FILM COATED ORAL
Qty: 90 TABLET | Refills: 1 | OUTPATIENT
Start: 2022-06-23

## 2022-07-07 ENCOUNTER — CARE COORDINATION (OUTPATIENT)
Dept: CARE COORDINATION | Age: 62
End: 2022-07-07

## 2022-07-07 NOTE — CARE COORDINATION
Ambulatory Care Coordination Note  7/7/2022  CM Risk Score: 5  Charlson 10 Year Mortality Risk Score: 100%     ACC: Jadyn Martin RN    Summary Note: spoke with patient who states that she is doing well and is ready for Graduation from [employer]. Patient states that the only thing that she needs is an appointment with Optometrist or Opthalmologiist to get glasses. I Agreed to reach out to social work to assist with getting appointment and glasses. Lab Results     None          Care Coordination Interventions    Program Enrollment: Complex Care  Referral from Primary Care Provider: No  Suggested Interventions and Community Resources  Diabetes Education: Completed  Zone Management Tools: Completed  Other Services or Interventions: An adult sitter stays with Kamran You while her brother is at work         Goals Addressed    None         Prior to Admission medications    Medication Sig Start Date End Date Taking?  Authorizing Provider   amLODIPine (NORVASC) 5 MG tablet Take 5 mg by mouth daily    Historical Provider, MD   Evolocumab (REPATHA) 140 MG/ML SOSY Inject 140 mg into the skin Every 15 days    Historical Provider, MD   hydrALAZINE (APRESOLINE) 100 MG tablet Take 100 mg by mouth 3 times daily 6/10/22   Historical Provider, MD   carvedilol (COREG) 12.5 MG tablet Take 2 tablets by mouth 2 times daily 6/13/22   KLARISSA Aguilar CNP   furosemide (LASIX) 80 MG tablet Take 1 tablet by mouth 2 times daily 6/13/22   KLARISSA Aguilar CNP   losartan (COZAAR) 100 MG tablet Take 100 mg by mouth daily    Historical Provider, MD   Calcium Carbonate Antacid (TUMS ULTRA 1000) 1000 MG CHEW Take 1,000 mg by mouth    Historical Provider, MD   acetaminophen (TYLENOL) 500 MG tablet Take 500 mg by mouth every 6 hours as needed for Pain    Historical Provider, MD   isosorbide-hydrALAZINE (BIDIL) 20-37.5 MG per tablet Take 1 tablet by mouth 3 times daily 3/9/22   Kristel Brewer MD   fenofibrate micronized (LOFIBRA) 67 ophthalmic solution Place 1 drop into the right eye daily  12/4/17   Historical Provider, MD   Misc. Devices (TRANSFER BENCH) MISC Use shower transfer bench with back,  every time patient gets in/or out of the shower. Please dispense insurance approved shower transfer bench. 8/15/17   Abe Chin MD   Earna Vladimir LANCETS 00T MISC  10/5/16   Historical Provider, MD   ONE TOUCH ULTRA TEST strip  1/24/16   Historical Provider, MD   Blood Pressure Monitoring (B-D ASSURE BPM/AUTO WRIST CUFF) MISC As directed. Labile hypertension. 1/25/16   Abe Chin MD   B-D ULTRAFINE III SHORT PEN 31G X 8 MM MISC  3/15/14   Historical Provider, MD       Future Appointments   Date Time Provider Jack Shah   8/1/2022 10:45 AM STA DIAG MAMMO RM 3 STAZ MAMMO STA Radiolog   10/17/2022 10:30 AM Crystal Race, APRN - CNP [facility]      and   Diabetes Assessment    Medic Alert ID: No  Meal Planning: Carb counting, Avoidance of concentrated sweets, Other   How often do you test your blood sugar?: Meals, Bedtime   Do you have barriers with adherence to non-pharmacologic self-management interventions?  (Nutrition/Exercise/Self-Monitoring): Yes   Have you ever had to go to the ED for symptoms of low blood sugar?: Yes   What is the date of your last ED visit for low blood sugar?: 4/19/22

## 2022-07-18 DIAGNOSIS — K21.00 GASTROESOPHAGEAL REFLUX DISEASE WITH ESOPHAGITIS, UNSPECIFIED WHETHER HEMORRHAGE: ICD-10-CM

## 2022-07-18 DIAGNOSIS — Z76.0 MEDICATION REFILL: ICD-10-CM

## 2022-07-18 RX ORDER — FAMOTIDINE 20 MG/1
TABLET, FILM COATED ORAL
Qty: 90 TABLET | Refills: 1 | Status: SHIPPED | OUTPATIENT
Start: 2022-07-18 | End: 2022-08-10 | Stop reason: SDUPTHER

## 2022-07-20 ENCOUNTER — CARE COORDINATION (OUTPATIENT)
Dept: CARE COORDINATION | Age: 62
End: 2022-07-20

## 2022-07-20 NOTE — CARE COORDINATION
Patient was referred to  by Evelyne Carrington/[facility], who states that this patient needs an eye exam and eye glasses. HC phoned the patient made introductions and stated the reason for the call. The patient explained all of her medical issues and the fact that she can't see and needs an eye exam and glasses. HC contacted the patients insurance and was given a couple of list of providers in the area.  contacted a couple of providers offices and was given a contact for a Retinology Specialist.  [facility]. left a message and provided contact information for a return call. Plan of Care  [facility] will reach out to the specialist if no response by 07/21/22.

## 2022-07-21 ENCOUNTER — CARE COORDINATION (OUTPATIENT)
Dept: CARE COORDINATION | Age: 62
End: 2022-07-21

## 2022-07-22 NOTE — CARE COORDINATION
[facility] phoned Dr. Emma Woods office in attempt to schedule patient an eye appointment and for glasses. Patient was scheduled for a eye exam for Monday, 08/29/22 @ 9:35a., @ [facility] yanelis [facility], which is the second drive from [address] [place] [address] . All of this information was given to the patient;harriett Kincaid, who wrote the information down for patient.     Plan of Care   [facility] will follow up with patient for other social needs

## 2022-08-08 ENCOUNTER — CARE COORDINATION (OUTPATIENT)
Dept: CARE COORDINATION | Age: 62
End: 2022-08-08

## 2022-08-08 NOTE — CARE COORDINATION
Initial Contact Social Work Note - Ambulatory  8/8/2022      Date of referral: 07/07/22  Referral received from: Oniel Shultz  Reason for referral: Assist with finding a eye doctor and get eyeglasses    Previous SW referral: No  If yes, brief summary of outcome:     Two Identifiers Verified: Yes    Insurance Provider: Medicare/Medicaid/C    Support System:  Sibling(s)     Status: N/A    Community Providers: Local Agency on Aging    ADL Assistance Needed: N/A    Housing/Living Concerns or Home Modification Needs: N/A    Transportation Concern: Yes for future dialysis    Medication Cost Concern: N/A    Medication Adherence Concern: N/A    Financial Concern(s): N/A    Income (only if applicable): SSDI    Ability to Read/Write: Yes    Advance Care Plan:  Not on file; educated patient    Other:     Identified Needs:  Appointment with Eye Doctor and getting glasses  Made a referral for [address] [place] [address]    Social Work Plan:  Appointment scheduled for patient [birth year]  Application completed for [employer]  Will have ACP documents   Transportation for upcoming dialysis treaments  Next Steps: Will have ACP documents mailed to patient           Goals Addressed                   This [address] Resource Goal   Improving     Patient needs to find a eye doctor that will accept her insurance for a medical eye exam and glasses. Barriers: fear of failure, lack of motivation, financial, lack of support, overwhelmed by complexity of regimen, stress, time constraints, medication side effects, and lack of education    Plan for overcoming my barriers: [facility], [facility]. is researching eye doctors that will accept patients insurance.     Confidence: 8/10    Anticipated Goal Completion Date: 10/20/22

## 2022-08-10 DIAGNOSIS — E11.42 TYPE 2 DIABETES MELLITUS WITH DIABETIC POLYNEUROPATHY, WITH LONG-TERM CURRENT USE OF INSULIN (HCC): ICD-10-CM

## 2022-08-10 DIAGNOSIS — Z79.4 TYPE 2 DIABETES MELLITUS WITH DIABETIC POLYNEUROPATHY, WITH LONG-TERM CURRENT USE OF INSULIN (HCC): ICD-10-CM

## 2022-08-10 DIAGNOSIS — I10 ESSENTIAL HYPERTENSION: ICD-10-CM

## 2022-08-10 DIAGNOSIS — K21.00 GASTROESOPHAGEAL REFLUX DISEASE WITH ESOPHAGITIS, UNSPECIFIED WHETHER HEMORRHAGE: ICD-10-CM

## 2022-08-10 DIAGNOSIS — Z76.0 MEDICATION REFILL: ICD-10-CM

## 2022-08-10 RX ORDER — ATORVASTATIN CALCIUM 80 MG/1
80 TABLET, FILM COATED ORAL DAILY
Qty: 90 TABLET | Refills: 1 | Status: SHIPPED | OUTPATIENT
Start: 2022-08-10

## 2022-08-10 RX ORDER — AMLODIPINE BESYLATE 5 MG/1
5 TABLET ORAL DAILY
Qty: 90 TABLET | Refills: 0 | OUTPATIENT
Start: 2022-08-10

## 2022-08-10 RX ORDER — LOSARTAN POTASSIUM 100 MG/1
100 TABLET ORAL DAILY
Qty: 90 TABLET | Refills: 1 | Status: SHIPPED | OUTPATIENT
Start: 2022-08-10

## 2022-08-10 RX ORDER — CARVEDILOL 12.5 MG/1
25 TABLET ORAL 2 TIMES DAILY
Qty: 360 TABLET | Refills: 1 | Status: SHIPPED | OUTPATIENT
Start: 2022-08-10 | End: 2022-11-08

## 2022-08-10 RX ORDER — FUROSEMIDE 80 MG
80 TABLET ORAL 2 TIMES DAILY
Qty: 60 TABLET | Refills: 3 | Status: SHIPPED | OUTPATIENT
Start: 2022-08-10

## 2022-08-10 RX ORDER — HYDRALAZINE HYDROCHLORIDE 100 MG/1
100 TABLET, FILM COATED ORAL 3 TIMES DAILY
Qty: 60 TABLET | OUTPATIENT
Start: 2022-08-10

## 2022-08-10 RX ORDER — CLONIDINE HYDROCHLORIDE 0.3 MG/1
0.3 TABLET ORAL 3 TIMES DAILY
Qty: 90 TABLET | Refills: 5 | Status: SHIPPED | OUTPATIENT
Start: 2022-08-10 | End: 2022-10-17

## 2022-08-10 RX ORDER — FENOFIBRATE 67 MG/1
CAPSULE ORAL
Qty: 90 CAPSULE | Refills: 1 | Status: SHIPPED | OUTPATIENT
Start: 2022-08-10

## 2022-08-10 RX ORDER — ISOSORBIDE DINITRATE AND HYDRALAZINE HYDROCHLORIDE 37.5; 2 MG/1; MG/1
1 TABLET ORAL 3 TIMES DAILY
Qty: 180 TABLET | Refills: 3 | Status: SHIPPED | OUTPATIENT
Start: 2022-08-10

## 2022-08-10 RX ORDER — FAMOTIDINE 20 MG/1
TABLET, FILM COATED ORAL
Qty: 90 TABLET | Refills: 1 | Status: SHIPPED | OUTPATIENT
Start: 2022-08-10

## 2022-08-11 ENCOUNTER — CARE COORDINATION (OUTPATIENT)
Dept: CARE COORDINATION | Age: 62
End: 2022-08-11

## 2022-08-12 ENCOUNTER — CARE COORDINATION (OUTPATIENT)
Dept: CARE COORDINATION | Age: 62
End: 2022-08-12

## 2022-08-12 NOTE — CARE COORDINATION
Initial Contact Social Work Note - Ambulatory  8/12/2022      Identified Needs:  [employer]      Social Work Plan:  [facility]. phoned and left a message @ [employer] w/patient on the phone  [facility] will follow up with patient regarding the meals. HC will contact patient and share sites to search for a larger house for patient and family  Next Steps: [facility] will reach out to patient next week. Goals Addressed                   This Visit's Progress       Care Coordination     Community Resource Goal   No change     Patient needs to find a eye doctor that will accept her insurance for a medical eye exam and glasses. Barriers: fear of failure, lack of motivation, financial, lack of support, overwhelmed by complexity of regimen, stress, time constraints, medication side effects, and lack of education    Plan for overcoming my barriers: [facility] is researching eye doctors that will accept patients insurance.     Confidence: 8/10    Anticipated Goal Completion Date: 10/20/22

## 2022-08-13 DIAGNOSIS — E11.42 TYPE 2 DIABETES MELLITUS WITH DIABETIC POLYNEUROPATHY, WITH LONG-TERM CURRENT USE OF INSULIN (HCC): ICD-10-CM

## 2022-08-13 DIAGNOSIS — Z79.4 TYPE 2 DIABETES MELLITUS WITH DIABETIC POLYNEUROPATHY, WITH LONG-TERM CURRENT USE OF INSULIN (HCC): ICD-10-CM

## 2022-08-13 DIAGNOSIS — Z76.0 MEDICATION REFILL: ICD-10-CM

## 2022-08-15 RX ORDER — FENOFIBRATE 67 MG/1
CAPSULE ORAL
Qty: 90 CAPSULE | Refills: 1 | OUTPATIENT
Start: 2022-08-15

## 2022-08-18 ENCOUNTER — CARE COORDINATION (OUTPATIENT)
Dept: CARE COORDINATION | Age: 62
End: 2022-08-18

## 2022-08-18 NOTE — CARE COORDINATION
HC phoned the patient today to follow up on the [facility]. Patient reported that s she might have missed a call due to finding out that her phone had been on \"do not disturb\". Patient since has corrected the situation. HC called and left a message for [facility] for a return call, making a referral for meals for the patient. HC also provided patients' niece with the website for [address] to look for housing for the family. Plan of Care  [facility]. will follow up with patients' request next week.

## 2022-08-24 ENCOUNTER — CARE COORDINATION (OUTPATIENT)
Dept: CARE COORDINATION | Age: 62
End: 2022-08-24

## 2022-08-24 NOTE — CARE COORDINATION
Phoned patient and found that she is in the hospital due to her diabetes. She did report that she received her meals from [place].      Plan of Care  [facility] [place], [facility]. will follow up with patient next week

## 2022-08-30 ENCOUNTER — CARE COORDINATION (OUTPATIENT)
Dept: CARE COORDINATION | Age: 62
End: 2022-08-30

## 2022-08-30 NOTE — CARE COORDINATION
HC phoned the patient today to follow up on her appointment with Dr. Harini Otero.  Patient stated that she got confused and thought that she was supposed to have been at the kidney doctor. Patient wasn't scheduled for the kidney doctor until 09/01/22, and has been rescheduled to see  on 09/02/22.     Plan of Care  [facility]. will follow up with the patient next week

## 2022-09-01 RX ORDER — INSULIN HUMAN 500 [IU]/ML
50 INJECTION, SOLUTION SUBCUTANEOUS
OUTPATIENT
Start: 2022-09-01

## 2022-09-01 RX ORDER — EVOLOCUMAB 140 MG/ML
140 INJECTION, SOLUTION SUBCUTANEOUS
Qty: 2.1 ML | OUTPATIENT
Start: 2022-09-01

## 2022-09-01 RX ORDER — AMLODIPINE BESYLATE 5 MG/1
5 TABLET ORAL DAILY
Qty: 90 TABLET | Refills: 1 | Status: SHIPPED | OUTPATIENT
Start: 2022-09-01

## 2022-09-06 ENCOUNTER — CARE COORDINATION (OUTPATIENT)
Dept: CARE COORDINATION | Age: 62
End: 2022-09-06

## 2022-09-06 NOTE — CARE COORDINATION
HC phoned the patient to follow up on her eye appointment. Patient stated that she liked the eye doctor and that he injected her in the eye and she is to return to see him in four weeks. Patient stated that it was a good appointment for her. She is waiting for her food to be delivered to her and also waiting for her nephew to arrive and assist her with getting her medications together so all of them can be delivered together by mail. Patient reports she is enjoying her home delivered meals. Plan of Care  [facility] [place], [facility]. will follow up with the patient in a few weeks.

## 2022-09-09 DIAGNOSIS — I10 ESSENTIAL (PRIMARY) HYPERTENSION: ICD-10-CM

## 2022-09-09 DIAGNOSIS — Z76.0 ENCOUNTER FOR ISSUE OF REPEAT PRESCRIPTION: ICD-10-CM

## 2022-09-09 RX ORDER — AMLODIPINE BESYLATE 5 MG/1
TABLET ORAL
Qty: 90 TABLET | Refills: 1 | OUTPATIENT
Start: 2022-09-09

## 2022-09-13 ENCOUNTER — TELEPHONE (OUTPATIENT)
Dept: PRIMARY CARE CLINIC | Age: 62
End: 2022-09-13

## 2022-09-13 DIAGNOSIS — N18.4 CKD STAGE 4 SECONDARY TO HYPERTENSION (HCC): ICD-10-CM

## 2022-09-13 DIAGNOSIS — I12.9 CKD STAGE 4 SECONDARY TO HYPERTENSION (HCC): ICD-10-CM

## 2022-09-13 DIAGNOSIS — Z79.4 TYPE 2 DIABETES MELLITUS WITH DIABETIC POLYNEUROPATHY, WITH LONG-TERM CURRENT USE OF INSULIN (HCC): Primary | ICD-10-CM

## 2022-09-13 DIAGNOSIS — G81.91 RIGHT HEMIPARESIS (HCC): ICD-10-CM

## 2022-09-13 DIAGNOSIS — E11.42 TYPE 2 DIABETES MELLITUS WITH DIABETIC POLYNEUROPATHY, WITH LONG-TERM CURRENT USE OF INSULIN (HCC): Primary | ICD-10-CM

## 2022-09-13 NOTE — TELEPHONE ENCOUNTER
[facility] called stating they would like a nurse order for a nurse to come out and evaluate the and also check the  pt sugar and blood pressure , and possibly a order for Pt services  to see if pt will benefit . fax [phone] phone number [phone]

## 2022-09-24 DIAGNOSIS — Z79.4 TYPE 2 DIABETES MELLITUS WITH DIABETIC POLYNEUROPATHY, WITH LONG-TERM CURRENT USE OF INSULIN (HCC): ICD-10-CM

## 2022-09-24 DIAGNOSIS — E11.42 TYPE 2 DIABETES MELLITUS WITH DIABETIC POLYNEUROPATHY, WITH LONG-TERM CURRENT USE OF INSULIN (HCC): ICD-10-CM

## 2022-09-24 DIAGNOSIS — Z76.0 MEDICATION REFILL: ICD-10-CM

## 2022-09-24 RX ORDER — ATORVASTATIN CALCIUM 80 MG/1
TABLET, FILM COATED ORAL
Qty: 90 TABLET | Refills: 1 | OUTPATIENT
Start: 2022-09-24

## 2022-09-27 NOTE — TELEPHONE ENCOUNTER
Writer contacted [employer] spoke with Eric Palm. Confirmed pt was discharge from services in 6/2022. Provider will need to provide new orders for skilled nursing and physical therapy. Fax to [phone].  Please advise

## 2022-09-27 NOTE — TELEPHONE ENCOUNTER
Please clarify, it appears patient is already established with [facility]. If she not receiving the skilled nursing visits once weekly through them that involve a blood pressure check? Or do I need to place new orders for in-home physical therapy and skilled nursing?

## 2022-09-28 ENCOUNTER — CARE COORDINATION (OUTPATIENT)
Dept: CARE COORDINATION | Age: 62
End: 2022-09-28

## 2022-09-28 NOTE — CARE COORDINATION
[facility], [facility]. phoned patient to follow up on her social needs. Patient stated that she was doing well and had no current needs. She reported that she saw her eye doctor on yesterday and was given a second injection in her eye. Patient states that she like the doctor a lot. During our discussion, patient mentioned that she would benefit from having rides to her appointments to relieve her brother from breaking his rest to take her to her appointments. HC asked if the patient has anyone available to accompany her to her appointments. Patient will check around for a family member or friend that might be able to go with her to her appointments.     Plan of Care  [facility]. will follow up with patient if no response within a couple of weeks

## 2022-09-30 ENCOUNTER — TELEPHONE (OUTPATIENT)
Dept: PRIMARY CARE CLINIC | Age: 62
End: 2022-09-30

## 2022-09-30 NOTE — TELEPHONE ENCOUNTER
Samina with [address] called in stating referral was not received. Re-printed referral and faxed to [phone].

## 2022-10-05 RX ORDER — CHOLECALCIFEROL (VITAMIN D3) 1250 MCG
CAPSULE ORAL
Qty: 1 CAPSULE | OUTPATIENT
Start: 2022-10-05

## 2022-10-05 RX ORDER — CHOLECALCIFEROL (VITAMIN D3) 1250 MCG
CAPSULE ORAL
COMMUNITY
Start: 2022-07-17

## 2022-10-05 RX ORDER — EVOLOCUMAB 140 MG/ML
140 INJECTION, SOLUTION SUBCUTANEOUS
Qty: 2.1 ML | OUTPATIENT
Start: 2022-10-05

## 2022-10-05 NOTE — TELEPHONE ENCOUNTER
Health Maintenance   Topic Date Due    DTaP/Tdap/Td vaccine (1 - Tdap) Never done    Cervical cancer screen  Never done    Shingles vaccine (1 of 2) Never done    Diabetic foot exam  03/29/2017    Annual Wellness Visit (AWV)  Never done    Pneumococcal 0-64 years Vaccine (2 - PCV) 02/10/2021    Breast cancer screen  06/24/2021    Diabetic retinal exam  01/20/2022    COVID-19 Vaccine (3 - Booster for Moderna series) 03/03/2022    Flu vaccine (1) 08/01/2022    A1C test (Diabetic or Prediabetic)  11/23/2022    Depression Screen  01/17/2023    Lipids  08/22/2023    Colorectal Cancer Screen  06/25/2028    Hepatitis C screen  Completed    HIV screen  Completed    Hepatitis A vaccine  Aged Out    Hib vaccine  Aged Out    Meningococcal (ACWY) vaccine  Aged Out             (applicable per patient's age: Cancer Screenings, Depression Screening, Fall Risk Screening, Immunizations)    Hemoglobin A1C (%)   Date Value   01/17/2022 10.4   07/12/2021 10.3   02/10/2021 11.0     Microalb/Crt.  Ratio (mcg/mg creat)   Date Value   04/04/2015 3,460     LDL Cholesterol (mg/dL)   Date Value   04/04/2015          AST (U/L)   Date Value   04/04/2015 19     ALT (U/L)   Date Value   04/04/2015 24     BUN (mg/dL)   Date Value   05/16/2022 44      (goal A1C is < 7)   (goal LDL is <100) need 30-50% reduction from baseline     BP Readings from Last 3 Encounters:   06/13/22 (!) 184/81   04/28/22 (!) 162/76   03/09/22 (!) 160/74    (goal /80)      All Future Testing planned in [facility]:  Lab Frequency Next Occurrence   POCT glycosylated hemoglobin (Hb A1C) Once 06/11/2022   Baseline Diagnostic Sleep Study Once 04/28/2022       Next Visit Date:  Future Appointments   Date Time Provider Jack Shah   10/17/2022 10:30 AM KLARISSA Smith - CNP [facility]ALK [facility]            Patient Active Problem List:     Malignant neoplasm of uterus ([address]     Acid reflux     Heart murmur     Class 3 severe obesity due to excess calories with serious comorbidity and body mass index (BMI) of 40.0 to 44.9 in adult [facility])     Type 2 diabetes mellitus treated with insulin (HCC)     Microalbuminuria     Noncompliance with treatment     Hypokalemia     Lymphedema of leg     Primary osteoarthritis of both knees     Bad memory     Unsteady gait     Calculus of gallbladder     Weakness of left side of body     AF (amaurosis fugax)     Chronic kidney disease     Abnormal liver enzymes     Ataxic aphasia     Herpes simplex type 2 infection     Temporary cerebral vascular dysfunction     Moderate or severe vision impairment, both eyes     Familial hyperlipidemia     Essential hypertension     Occipital cerebral infarction [facility])     Drug overdose     Hypersomnolence     Ischemic stroke ([address]     Right hemiparesis ([address]     Leg wound, right, initial encounter

## 2022-10-17 ENCOUNTER — OFFICE VISIT (OUTPATIENT)
Dept: PRIMARY CARE CLINIC | Age: 62
End: 2022-10-17
Payer: MEDICARE

## 2022-10-17 VITALS
WEIGHT: 268 LBS | OXYGEN SATURATION: 99 % | HEART RATE: 79 BPM | DIASTOLIC BLOOD PRESSURE: 75 MMHG | SYSTOLIC BLOOD PRESSURE: 203 MMHG | BODY MASS INDEX: 43.26 KG/M2 | TEMPERATURE: 97 F

## 2022-10-17 DIAGNOSIS — E11.42 TYPE 2 DIABETES MELLITUS WITH DIABETIC POLYNEUROPATHY, WITH LONG-TERM CURRENT USE OF INSULIN (HCC): ICD-10-CM

## 2022-10-17 DIAGNOSIS — I12.9 CKD STAGE 4 SECONDARY TO HYPERTENSION (HCC): ICD-10-CM

## 2022-10-17 DIAGNOSIS — N18.4 CKD STAGE 4 SECONDARY TO HYPERTENSION (HCC): ICD-10-CM

## 2022-10-17 DIAGNOSIS — Z23 NEED FOR INFLUENZA VACCINATION: ICD-10-CM

## 2022-10-17 DIAGNOSIS — I10 ESSENTIAL (PRIMARY) HYPERTENSION: Primary | ICD-10-CM

## 2022-10-17 DIAGNOSIS — Z12.31 BREAST CANCER SCREENING BY MAMMOGRAM: ICD-10-CM

## 2022-10-17 DIAGNOSIS — N18.4 CKD (CHRONIC KIDNEY DISEASE) STAGE 4, GFR 15-29 ML/MIN (HCC): ICD-10-CM

## 2022-10-17 DIAGNOSIS — Z79.4 TYPE 2 DIABETES MELLITUS WITH DIABETIC POLYNEUROPATHY, WITH LONG-TERM CURRENT USE OF INSULIN (HCC): ICD-10-CM

## 2022-10-17 PROCEDURE — 2022F DILAT RTA XM EVC RTNOPTHY: CPT | Performed by: NURSE PRACTITIONER

## 2022-10-17 PROCEDURE — 3017F COLORECTAL CA SCREEN DOC REV: CPT | Performed by: NURSE PRACTITIONER

## 2022-10-17 PROCEDURE — 90674 CCIIV4 VAC NO PRSV 0.5 ML IM: CPT | Performed by: NURSE PRACTITIONER

## 2022-10-17 PROCEDURE — 3046F HEMOGLOBIN A1C LEVEL >9.0%: CPT | Performed by: NURSE PRACTITIONER

## 2022-10-17 PROCEDURE — 1036F TOBACCO NON-USER: CPT | Performed by: NURSE PRACTITIONER

## 2022-10-17 PROCEDURE — 99214 OFFICE O/P EST MOD 30 MIN: CPT | Performed by: NURSE PRACTITIONER

## 2022-10-17 PROCEDURE — G8417 CALC BMI ABV UP PARAM F/U: HCPCS | Performed by: NURSE PRACTITIONER

## 2022-10-17 PROCEDURE — G8482 FLU IMMUNIZE ORDER/ADMIN: HCPCS | Performed by: NURSE PRACTITIONER

## 2022-10-17 PROCEDURE — G0008 ADMIN INFLUENZA VIRUS VAC: HCPCS | Performed by: NURSE PRACTITIONER

## 2022-10-17 PROCEDURE — G8427 DOCREV CUR MEDS BY ELIG CLIN: HCPCS | Performed by: NURSE PRACTITIONER

## 2022-10-17 SDOH — ECONOMIC STABILITY: FOOD INSECURITY: WITHIN THE PAST 12 MONTHS, YOU WORRIED THAT YOUR FOOD WOULD RUN OUT BEFORE YOU GOT MONEY TO BUY MORE.: NEVER TRUE

## 2022-10-17 SDOH — ECONOMIC STABILITY: FOOD INSECURITY: WITHIN THE PAST 12 MONTHS, THE FOOD YOU BOUGHT JUST DIDN'T LAST AND YOU DIDN'T HAVE MONEY TO GET MORE.: NEVER TRUE

## 2022-10-17 ASSESSMENT — SOCIAL DETERMINANTS OF HEALTH (SDOH): HOW HARD IS IT FOR YOU TO PAY FOR THE VERY BASICS LIKE FOOD, HOUSING, MEDICAL CARE, AND HEATING?: NOT HARD AT ALL

## 2022-10-17 ASSESSMENT — ENCOUNTER SYMPTOMS
BLURRED VISION: 1
SHORTNESS OF BREATH: 1

## 2022-10-17 NOTE — PROGRESS NOTES
[facility]  [address] [place] [place] [id number]  Dept: [phone]  Dept Fax: [phone]    Theodore Gold (:  1960) is a 58 y.o. female,Established patient, here for evaluation of the following chief complaint(s):  Hypertension (4M follow up , PT wants PNEUMO vacc) and Diabetes    Theodore Gold is established with nephrology, last seen 10/10/2022 by nephrology consultants of [facility]. Ordered amlodipine 10 mg daily and Lasix 80 mg 1 tablet twice daily. Patient to have aVF placed for hemodialysis on . She reports to her Endocrinologist, Dr. Jeff Mendiola, for DM.          ASSESSMENT/PLAN:  1. Essential (primary) hypertension  2. CKD (chronic kidney disease) stage 4, GFR 15-29 ml/min (HCC)  3. CKD stage 4 secondary to hypertension ([address]  4. Type 2 diabetes mellitus with diabetic polyneuropathy, with long-term current use of insulin ([address]  5. Need for influenza vaccination  -     Influenza, FLUCELVAX, (age 10 mo+), IM, Preservative Free, 0.5 mL  6. Breast cancer screening by mammogram  -     [facility] DIGITAL SCREEN W OR WO CAD BILATERAL; Future    Uncontrolled type 2 diabetes, managed by endocrinology. Blood pressure elevated today however patient has not yet taken medications. Heavily reviewed current prescriptions, including recent adjustments by nephrology with increasing amlodipine to 10 mg a day. Medication list states losartan 100 mg per nephrology office. Vaccine counseling provided today, patient is due for influenza vaccine. Return in about 3 months (around 2023) for HTN, Diabetes. Subjective   SUBJECTIVE/OBJECTIVE:  Ms. Bo Ram is here today to follow-up for diabetes hypertension and chronic conditions.       States she saw Dr Hamilton Maxwell for her diabetes last week    Had a visit with Nephrology last month, they are planning to start dialysis and she is getting a fistula placed next month. She has not yet taken her blood pressure medications. Is having some confusion as she has some medications prepackaged and a pill pack and some medication separately coming from [facility].      Follows with eye doctor, goes back on 8/28. Used to get injections. States she bleeding behind the eyes has stopped  Needs a new doctor due to her insurance      Takes U-500 insulin before meals  Also on an injectable twice monthly for cholesterol    Hx of uterine cancer, had a hysterectomy. States she was told she did not have to follow up with GYN anymore. Follows with cardiology yearly, through [address]        Hypertension  This is a chronic problem. The problem is unchanged. The problem is controlled. Associated symptoms include blurred vision, peripheral edema, shortness of breath (on exertion) and sweats. Pertinent negatives include no chest pain, headaches or neck pain. Risk factors for coronary artery disease include obesity, post-menopausal state and diabetes mellitus. Past treatments include diuretics, ACE inhibitors and beta blockers. Hypertensive end-organ damage includes kidney disease and CVA. Diabetes  She presents for her follow-up diabetic visit. She has type 2 diabetes mellitus. Her disease course has been stable. Hypoglycemia symptoms include sweats. Pertinent negatives for hypoglycemia include no dizziness, headaches or tremors. Associated symptoms include blurred vision. Pertinent negatives for diabetes include no chest pain. There are no hypoglycemic complications. Symptoms are stable. Diabetic complications include a CVA. Current diabetic treatment includes insulin injections. Review of Systems   Eyes:  Positive for blurred vision. Respiratory:  Positive for shortness of breath (on exertion). Cardiovascular:  Negative for chest pain. Musculoskeletal:  Negative for neck pain. Neurological:  Negative for dizziness, tremors and headaches.         Objective     DIAGNOSTIC FINDINGS:  CBC:  Lab Results   Component Value Date/Time    WBC 5.4 2015 10:37 AM    HGB 14.2 2015 10:37 AM     2015 10:37 AM       BMP:    Lab Results   Component Value Date/Time     2022 12:00 AM    K 4.5 2022 12:00 AM     2022 12:00 AM    CO2 25 2022 12:00 AM    BUN 44 2022 12:00 AM    CREATININE 2.74 2022 12:00 AM    CREATININE 1.7 2020 12:00 AM    GLUCOSE 208 2022 12:00 AM       HEMOGLOBIN A1C:   Lab Results   Component Value Date/Time    LABA1C 10.4 2022 10:31 AM       FASTING LIPID PANEL:  Lab Results   Component Value Date    CHOL 291 (H) 2015    HDL 42 2015    TRIG 502 (H) 2015       Physical Exam  Vitals reviewed. Constitutional:       Appearance: Normal appearance. She is well-developed and well-groomed. She is obese. HENT:      Head: Normocephalic and atraumatic. Right Ear: External ear normal.      Left Ear: External ear normal.      Nose: Nose normal.   Eyes:      General: Lids are normal. No scleral icterus. Conjunctiva/sclera: Conjunctivae normal.      Pupils: Pupils are equal, round, and reactive to light. Cardiovascular:      Rate and Rhythm: Normal rate and regular rhythm. Heart sounds: Normal heart sounds. Pulmonary:      Effort: Pulmonary effort is normal.      Breath sounds: Normal breath sounds. No decreased air movement. Abdominal:      General: Bowel sounds are normal.      Palpations: Abdomen is soft. There is no mass. Tenderness: There is no abdominal tenderness. Musculoskeletal:      Cervical back: Normal range of motion and neck supple. Right lower le+ Pitting Edema present. Left lower le+ Pitting Edema present. Skin:     General: Skin is warm and dry. Neurological:      Mental Status: She is alert and oriented to person, place, and time. Gait: Gait abnormal.   Psychiatric:         Behavior: Behavior is cooperative. Thought Content: Thought content normal.         Judgment: Judgment normal.          An electronic signature was used to authenticate this note.     --KLARISSA Velez - CNP

## 2022-10-17 NOTE — PROGRESS NOTES
Visit Information    Have you changed or started any medications since your last visit including any over-the-counter medicines, vitamins, or herbal medicines? no   Are you having any side effects from any of your medications? -  no  Have you stopped taking any of your medications? Is so, why? -  no    Have you seen any other physician or provider since your last visit? No  Have you had any other diagnostic tests since your last visit? Yes - Records Obtained  Have you been seen in the emergency room and/or had an admission to a hospital since we last saw you? Yes - Records Obtained  Have you had your routine dental cleaning in the past 6 months? no    Have you activated your [email or web address] account? If not, what are your barriers?  Yes     Patient Care Team:  KLARISSA Miller CNP as PCP - General (Family Medicine)  KLARISSA Miller CNP as PCP - [facility]  Jearldine Peabody as Consulting Physician (Endocrinology)  KLARISSA Conner CNP as Nurse Practitioner (Neurology)  Lou Verdugo MD as Consulting Physician (Ophthalmology)  Charlie Ulloa as Health     Medical History Review  Past Medical, Family, and Social History reviewed and does not contribute to the patient presenting condition    Health Maintenance   Topic Date Due    DTaP/Tdap/Td vaccine (1 - Tdap) Never done    Shingles vaccine (1 of 2) Never done    Diabetic foot exam  03/29/2017    Annual Wellness Visit (AWV)  Never done    Pneumococcal 0-64 years Vaccine (2 - PCV) 02/10/2021    Breast cancer screen  06/24/2021    Diabetic retinal exam  01/20/2022    COVID-19 Vaccine (3 - Booster for Birtha Asper series) 03/03/2022    Flu vaccine (1) 08/01/2022    A1C test (Diabetic or Prediabetic)  11/23/2022    Depression Screen  01/17/2023    Lipids  10/10/2023    Colorectal Cancer Screen  06/25/2028    Hepatitis C screen  Completed    HIV screen  Completed    Hepatitis A vaccine  Aged Out    Hib vaccine  Aged Out    Meningococcal (ACWY) vaccine  Aged Out

## 2022-11-08 ENCOUNTER — CARE COORDINATION (OUTPATIENT)
Dept: CARE COORDINATION | Age: 62
End: 2022-11-08

## 2022-11-08 NOTE — CARE COORDINATION
HC attempted to contact the patient, there was no answer. HC attempted to leave a voicemail and found that the patients'  mailbox is full.     Plan of Care  [facility] OF [facility]. will attempt to reach patient again next week

## 2022-11-11 ENCOUNTER — TELEPHONE (OUTPATIENT)
Dept: PRIMARY CARE CLINIC | Age: 62
End: 2022-11-11
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
## 2022-11-11 RX ORDER — ACETAMINOPHEN 500 MG
500 TABLET ORAL EVERY 6 HOURS PRN
Qty: 120 TABLET | Refills: 0 | Status: SHIPPED | OUTPATIENT
Start: 2022-11-11

## 2022-11-11 NOTE — TELEPHONE ENCOUNTER
Writer placed call to Jack Lange. Informed of pcp recommendations and orders. Kasey voiced understanding.

## 2022-11-11 NOTE — TELEPHONE ENCOUNTER
Nurse Leslie Arrieta with [employer] called in stating pt had fall inside home and fell on bottom. Pt c/o Tail bone pain 6/10. Denied falling on head and no other symptoms. Pt is A&0 x3. Requesting order for an XR and something for pain.

## 2022-11-11 NOTE — TELEPHONE ENCOUNTER
Xray ordered. Tylenol sent for pain, I also suggest using an Ice pack off and on.  Applied for 10 minutes at a time

## 2022-11-14 ENCOUNTER — TELEPHONE (OUTPATIENT)
Dept: PRIMARY CARE CLINIC | Age: 62
End: 2022-11-14

## 2022-11-14 NOTE — TELEPHONE ENCOUNTER
Pt nurse rodney from [address]  called stating pt has been having frequent dizzy spells that come and go dizzy, nurse states pt has been  loosing consciousness in  and out not to alert thru periods of the day . Symptoms started this morning . Reported 283  for blood  sugar. 180 was pts  reading this morning  at 11am .Please advise. pt nurse states she doesn't have a sliding scale . Writer suggested  nurse to take pt do er due to pt going in and out of consciousness thru the day .        Contact  Rodney from [address] [phone]

## 2022-11-28 ENCOUNTER — CARE COORDINATION (OUTPATIENT)
Dept: CARE COORDINATION | Age: 62
End: 2022-11-28

## 2022-11-28 NOTE — CARE COORDINATION
reached out to the patient to follow up on her vision  and other social needs. Patient stated that she was recently  in the hospital for her diabetes. She reports that she and her  brother are in the process of moving. They are waiting at this  point to hear if the application has been approved.      Plan of Care  [facility]. will follow up with the patient for other social needs

## 2023-01-17 ENCOUNTER — OFFICE VISIT (OUTPATIENT)
Dept: PRIMARY CARE CLINIC | Age: 63
End: 2023-01-17
Payer: MEDICARE

## 2023-01-17 VITALS
DIASTOLIC BLOOD PRESSURE: 61 MMHG | WEIGHT: 253.2 LBS | BODY MASS INDEX: 40.87 KG/M2 | SYSTOLIC BLOOD PRESSURE: 116 MMHG | OXYGEN SATURATION: 100 % | HEART RATE: 68 BPM

## 2023-01-17 DIAGNOSIS — Z79.4 TYPE 2 DIABETES MELLITUS WITH DIABETIC POLYNEUROPATHY, WITH LONG-TERM CURRENT USE OF INSULIN (HCC): Primary | ICD-10-CM

## 2023-01-17 DIAGNOSIS — I10 ESSENTIAL (PRIMARY) HYPERTENSION: ICD-10-CM

## 2023-01-17 DIAGNOSIS — E11.42 TYPE 2 DIABETES MELLITUS WITH DIABETIC POLYNEUROPATHY, WITH LONG-TERM CURRENT USE OF INSULIN (HCC): Primary | ICD-10-CM

## 2023-01-17 DIAGNOSIS — N18.4 CKD STAGE 4 SECONDARY TO HYPERTENSION (HCC): ICD-10-CM

## 2023-01-17 DIAGNOSIS — I12.9 CKD STAGE 4 SECONDARY TO HYPERTENSION (HCC): ICD-10-CM

## 2023-01-17 DIAGNOSIS — D89.89 LIGHT CHAIN DISEASE (HCC): ICD-10-CM

## 2023-01-17 DIAGNOSIS — N18.4 CKD (CHRONIC KIDNEY DISEASE) STAGE 4, GFR 15-29 ML/MIN (HCC): ICD-10-CM

## 2023-01-17 LAB — HBA1C MFR BLD: 13.7 %

## 2023-01-17 PROCEDURE — 2022F DILAT RTA XM EVC RTNOPTHY: CPT | Performed by: NURSE PRACTITIONER

## 2023-01-17 PROCEDURE — 1036F TOBACCO NON-USER: CPT | Performed by: NURSE PRACTITIONER

## 2023-01-17 PROCEDURE — G8482 FLU IMMUNIZE ORDER/ADMIN: HCPCS | Performed by: NURSE PRACTITIONER

## 2023-01-17 PROCEDURE — 3074F SYST BP LT 130 MM HG: CPT | Performed by: NURSE PRACTITIONER

## 2023-01-17 PROCEDURE — 99215 OFFICE O/P EST HI 40 MIN: CPT | Performed by: NURSE PRACTITIONER

## 2023-01-17 PROCEDURE — G8417 CALC BMI ABV UP PARAM F/U: HCPCS | Performed by: NURSE PRACTITIONER

## 2023-01-17 PROCEDURE — 3046F HEMOGLOBIN A1C LEVEL >9.0%: CPT | Performed by: NURSE PRACTITIONER

## 2023-01-17 PROCEDURE — 3017F COLORECTAL CA SCREEN DOC REV: CPT | Performed by: NURSE PRACTITIONER

## 2023-01-17 PROCEDURE — 83036 HEMOGLOBIN GLYCOSYLATED A1C: CPT | Performed by: NURSE PRACTITIONER

## 2023-01-17 PROCEDURE — 3078F DIAST BP <80 MM HG: CPT | Performed by: NURSE PRACTITIONER

## 2023-01-17 PROCEDURE — G8427 DOCREV CUR MEDS BY ELIG CLIN: HCPCS | Performed by: NURSE PRACTITIONER

## 2023-01-17 RX ORDER — FUROSEMIDE 80 MG
40 TABLET ORAL 2 TIMES DAILY
Qty: 60 TABLET | Refills: 3
Start: 2023-01-17

## 2023-01-17 RX ORDER — AMLODIPINE BESYLATE 10 MG/1
10 TABLET ORAL DAILY
Qty: 90 TABLET | Refills: 0
Start: 2023-01-17 | End: 2023-04-17

## 2023-01-17 ASSESSMENT — ENCOUNTER SYMPTOMS
TROUBLE SWALLOWING: 0
BLOOD IN STOOL: 0
WHEEZING: 0
DIARRHEA: 0
BACK PAIN: 0
NAUSEA: 0
COUGH: 0
SHORTNESS OF BREATH: 1
CONSTIPATION: 0
VOMITING: 0

## 2023-01-17 ASSESSMENT — PATIENT HEALTH QUESTIONNAIRE - PHQ9
1. LITTLE INTEREST OR PLEASURE IN DOING THINGS: 0
SUM OF ALL RESPONSES TO PHQ QUESTIONS 1-9: 0
2. FEELING DOWN, DEPRESSED OR HOPELESS: 0
SUM OF ALL RESPONSES TO PHQ QUESTIONS 1-9: 0
SUM OF ALL RESPONSES TO PHQ9 QUESTIONS 1 & 2: 0

## 2023-01-17 NOTE — PROGRESS NOTES
[address] PRIMARY CARE  [address] [address]  Dept: [phone]  Dept Fax: [phone]    Elidia Kam (:  1960) is a 58 y.o. female,Established patient, here for evaluation of the following chief complaint(s):  Hypertension (3 month follow up) and Diabetes (3 month follow up)    Elidia Kam is a 80-year-old female here today for annual follow-up of multiple chronic conditions. Patient had 3 hospitalizations since last visit in October. Most recent admission 2022 for hypotension at [facility]. Upcoming appointment with endocrinology on 2023, Dr. Diana Marrero. ASSESSMENT/PLAN:  1. Type 2 diabetes mellitus with diabetic polyneuropathy, with long-term current use of insulin ([facility])  -     POCT glycosylated hemoglobin (Hb A1C)  2. Essential (primary) hypertension  -     furosemide (LASIX) 80 MG tablet; Take 0.5 tablets by mouth 2 times daily, Disp-60 tablet, R-3NO PRINT  -     amLODIPine (NORVASC) 10 MG tablet; Take 1 tablet by mouth daily, Disp-90 tablet, R-0NO PRINT  3. CKD stage 4 secondary to hypertension ([facility]  4. CKD (chronic kidney disease) stage 4, GFR 15-29 ml/min ([facility])  5. Light chain disease ([facility]      Reviewed discharge instructions from last 2 hospitalizations. Lasix was decreased to 40 mg twice daily. Currently on amlodipine 10 mg. Was sent home  with 1 month supply of Mestinon. Patient does not have with her today, however she maintains she is taking that capsule. It is unclear who is filling the medication as she only received 1 month supply that would have ended . We will reach out to [facility] cardiology as it appears she was also referred by them to hematology due to elevated kappa lambda light chains during previous admission at [facility]. Called and spoke with Alyssa Garcia at [facility] cardiology clinic.   States patient does not appear established with any of the cardiologist.  Did have 2 missed appointments with them, one on November 22 and the most recent at the [facility] location on December 27. Provided you with a phone number to the [facility] office at [phone]. Left message regarding concern for findings of even monitor and hematology referral placed by Dr. Dede Hansen. Uncontrolled type 2 diabetes, discussed with patient that she risks having infection of AV fistula site given today's findings and highly encouraged that she keep appointment with endocrinology next month. Return in about 3 months (around 4/17/2023) for HTN, Diabetes. Subjective   SUBJECTIVE/OBJECTIVE:  Pt has not seen any specialists since discharge Before Thanksgiving. She did have 1 fall last week, the only one since last admission. Did wear event monitor, \"I still have it\". Denies having return instructions    Blood pressures have been good, now taking lasix 40 mg twice a day    Needs to have dialysis fistula placed      Review of Systems   Constitutional:  Negative for chills and fever. HENT:  Negative for congestion and trouble swallowing. Respiratory:  Positive for shortness of breath (on exertion). Negative for cough and wheezing. Cardiovascular:  Positive for leg swelling. Negative for chest pain. Gastrointestinal:  Negative for blood in stool, constipation, diarrhea, nausea and vomiting. Genitourinary:  Negative for difficulty urinating, dysuria, frequency and urgency. Musculoskeletal:  Positive for arthralgias. Negative for back pain, myalgias and neck pain. Neurological:  Negative for dizziness, tremors, syncope and headaches.         Objective     DIAGNOSTIC FINDINGS:  CBC:  Lab Results   Component Value Date/Time    WBC 5.4 04/04/2015 10:37 AM    HGB 14.2 04/04/2015 10:37 AM     04/04/2015 10:37 AM       BMP:    Lab Results   Component Value Date/Time     05/16/2022 12:00 AM    K 4.5 05/16/2022 12:00 AM     05/16/2022 12:00 AM    CO2 25 2022 12:00 AM    BUN 44 2022 12:00 AM    CREATININE 2.74 2022 12:00 AM    CREATININE 1.7 2020 12:00 AM    GLUCOSE 208 2022 12:00 AM       HEMOGLOBIN A1C:   Lab Results   Component Value Date/Time    LABA1C 13.7 2023 11:27 AM       FASTING LIPID PANEL:  Lab Results   Component Value Date    CHOL 291 (H) 2015    HDL 42 2015    TRIG 502 (H) 2015       Physical Exam  Vitals reviewed. Constitutional:       Appearance: Normal appearance. She is well-developed and well-groomed. She is obese. HENT:      Head: Normocephalic and atraumatic. Right Ear: External ear normal.      Left Ear: External ear normal.      Nose: Nose normal.   Eyes:      General: Lids are normal. No scleral icterus. Conjunctiva/sclera: Conjunctivae normal.      Pupils: Pupils are equal, round, and reactive to light. Cardiovascular:      Rate and Rhythm: Normal rate and regular rhythm. Heart sounds: Murmur heard. Systolic murmur is present with a grade of 1/6. Pulmonary:      Effort: Pulmonary effort is normal.      Breath sounds: Normal breath sounds. No decreased air movement. Abdominal:      General: Bowel sounds are normal.      Palpations: Abdomen is soft. There is no mass. Tenderness: There is no abdominal tenderness. Musculoskeletal:      Cervical back: Normal range of motion and neck supple. Right lower le+ Pitting Edema present. Left lower le+ Pitting Edema present. Skin:     General: Skin is warm and dry. Neurological:      Mental Status: She is alert and oriented to person, place, and time. Gait: Gait abnormal.   Psychiatric:         Behavior: Behavior is cooperative. Thought Content: Thought content normal.         Judgment: Judgment normal.          An electronic signature was used to authenticate this note.     --Cleola Canavan, APRN - CNP

## 2023-01-26 DIAGNOSIS — Z76.0 MEDICATION REFILL: ICD-10-CM

## 2023-01-26 DIAGNOSIS — Z79.4 TYPE 2 DIABETES MELLITUS WITH DIABETIC POLYNEUROPATHY, WITH LONG-TERM CURRENT USE OF INSULIN (HCC): ICD-10-CM

## 2023-01-26 DIAGNOSIS — I10 ESSENTIAL HYPERTENSION: ICD-10-CM

## 2023-01-26 DIAGNOSIS — E11.42 TYPE 2 DIABETES MELLITUS WITH DIABETIC POLYNEUROPATHY, WITH LONG-TERM CURRENT USE OF INSULIN (HCC): ICD-10-CM

## 2023-01-26 RX ORDER — FENOFIBRATE 67 MG/1
CAPSULE ORAL
Qty: 90 CAPSULE | Refills: 1 | Status: SHIPPED | OUTPATIENT
Start: 2023-01-26

## 2023-01-26 RX ORDER — CARVEDILOL 12.5 MG/1
TABLET ORAL
Qty: 360 TABLET | Refills: 1 | Status: SHIPPED | OUTPATIENT
Start: 2023-01-26

## 2023-01-26 RX ORDER — ATORVASTATIN CALCIUM 80 MG/1
TABLET, FILM COATED ORAL
Qty: 90 TABLET | Refills: 1 | Status: SHIPPED | OUTPATIENT
Start: 2023-01-26

## 2023-02-02 RX ORDER — EVOLOCUMAB 140 MG/ML
140 INJECTION, SOLUTION SUBCUTANEOUS
Qty: 2.1 ML | OUTPATIENT
Start: 2023-02-02

## 2023-02-02 RX ORDER — CHOLECALCIFEROL (VITAMIN D3) 1250 MCG
CAPSULE ORAL
Qty: 1 CAPSULE | OUTPATIENT
Start: 2023-02-02

## 2023-02-02 NOTE — TELEPHONE ENCOUNTER
Luisa Calhoun is calling to request a refill on the following medication(s):    Medication Request:  Requested Prescriptions     Pending Prescriptions Disp Refills    Cholecalciferol (VITAMIN D3) 1.25 MG (38210 UT) CAPS 1 capsule     Evolocumab (REPATHA) SOSY syringe 2.1 mL      Sig: Inject 1 mL into the skin Every 15 days       Last Visit Date (If Applicable):  0/36/1874    Next Visit Date:    4/17/2023

## 2023-02-28 DIAGNOSIS — Z76.0 MEDICATION REFILL: ICD-10-CM

## 2023-02-28 DIAGNOSIS — K21.00 GASTROESOPHAGEAL REFLUX DISEASE WITH ESOPHAGITIS, UNSPECIFIED WHETHER HEMORRHAGE: ICD-10-CM

## 2023-02-28 RX ORDER — FAMOTIDINE 20 MG/1
TABLET, FILM COATED ORAL
Qty: 90 TABLET | Refills: 1 | Status: SHIPPED | OUTPATIENT
Start: 2023-02-28

## 2023-03-31 RX ORDER — LOSARTAN POTASSIUM 100 MG/1
TABLET ORAL
Qty: 90 TABLET | Refills: 1 | Status: SHIPPED | OUTPATIENT
Start: 2023-03-31

## 2023-04-25 DIAGNOSIS — I10 ESSENTIAL HYPERTENSION: ICD-10-CM

## 2023-04-26 RX ORDER — ISOSORBIDE DINITRATE AND HYDRALAZINE HYDROCHLORIDE 37.5; 2 MG/1; MG/1
TABLET ORAL
Qty: 180 TABLET | Refills: 11 | Status: SHIPPED | OUTPATIENT
Start: 2023-04-26

## 2023-06-22 ENCOUNTER — OFFICE VISIT (OUTPATIENT)
Dept: PRIMARY CARE CLINIC | Age: 63
End: 2023-06-22
Payer: MEDICARE

## 2023-06-22 VITALS
HEART RATE: 95 BPM | BODY MASS INDEX: 40.74 KG/M2 | DIASTOLIC BLOOD PRESSURE: 78 MMHG | SYSTOLIC BLOOD PRESSURE: 156 MMHG | OXYGEN SATURATION: 97 % | WEIGHT: 252.4 LBS

## 2023-06-22 DIAGNOSIS — G81.91 RIGHT HEMIPARESIS (HCC): ICD-10-CM

## 2023-06-22 DIAGNOSIS — Z76.0 MEDICATION REFILL: ICD-10-CM

## 2023-06-22 DIAGNOSIS — Z79.4 TYPE 2 DIABETES MELLITUS WITH DIABETIC POLYNEUROPATHY, WITH LONG-TERM CURRENT USE OF INSULIN (HCC): ICD-10-CM

## 2023-06-22 DIAGNOSIS — I10 ESSENTIAL (PRIMARY) HYPERTENSION: ICD-10-CM

## 2023-06-22 DIAGNOSIS — N18.4 CKD STAGE 4 SECONDARY TO HYPERTENSION (HCC): Primary | ICD-10-CM

## 2023-06-22 DIAGNOSIS — N39.41 URGE INCONTINENCE: ICD-10-CM

## 2023-06-22 DIAGNOSIS — E11.42 TYPE 2 DIABETES MELLITUS WITH DIABETIC POLYNEUROPATHY, WITH LONG-TERM CURRENT USE OF INSULIN (HCC): ICD-10-CM

## 2023-06-22 DIAGNOSIS — I63.9 ISCHEMIC STROKE (HCC): ICD-10-CM

## 2023-06-22 DIAGNOSIS — I12.9 CKD STAGE 4 SECONDARY TO HYPERTENSION (HCC): Primary | ICD-10-CM

## 2023-06-22 PROCEDURE — G8417 CALC BMI ABV UP PARAM F/U: HCPCS | Performed by: NURSE PRACTITIONER

## 2023-06-22 PROCEDURE — 99214 OFFICE O/P EST MOD 30 MIN: CPT | Performed by: NURSE PRACTITIONER

## 2023-06-22 PROCEDURE — G8427 DOCREV CUR MEDS BY ELIG CLIN: HCPCS | Performed by: NURSE PRACTITIONER

## 2023-06-22 PROCEDURE — 3077F SYST BP >= 140 MM HG: CPT | Performed by: NURSE PRACTITIONER

## 2023-06-22 PROCEDURE — 3078F DIAST BP <80 MM HG: CPT | Performed by: NURSE PRACTITIONER

## 2023-06-22 PROCEDURE — 1036F TOBACCO NON-USER: CPT | Performed by: NURSE PRACTITIONER

## 2023-06-22 PROCEDURE — 3046F HEMOGLOBIN A1C LEVEL >9.0%: CPT | Performed by: NURSE PRACTITIONER

## 2023-06-22 PROCEDURE — 2022F DILAT RTA XM EVC RTNOPTHY: CPT | Performed by: NURSE PRACTITIONER

## 2023-06-22 PROCEDURE — 3017F COLORECTAL CA SCREEN DOC REV: CPT | Performed by: NURSE PRACTITIONER

## 2023-06-22 RX ORDER — LOSARTAN POTASSIUM 25 MG/1
25 TABLET ORAL DAILY
Qty: 90 TABLET | Refills: 0 | Status: SHIPPED | OUTPATIENT
Start: 2023-06-22 | End: 2023-09-20

## 2023-06-22 RX ORDER — AMLODIPINE BESYLATE 10 MG/1
10 TABLET ORAL DAILY
Qty: 90 TABLET | Refills: 1 | Status: SHIPPED | OUTPATIENT
Start: 2023-06-22 | End: 2023-12-19

## 2023-06-22 RX ORDER — ATORVASTATIN CALCIUM 80 MG/1
TABLET, FILM COATED ORAL
Qty: 90 TABLET | Refills: 1 | Status: SHIPPED | OUTPATIENT
Start: 2023-06-22

## 2023-06-22 RX ORDER — UNDERPADS 23" X 36"
1 EACH MISCELLANEOUS
Qty: 200 EACH | Refills: 2 | Status: SHIPPED | OUTPATIENT
Start: 2023-06-22 | End: 2023-07-22

## 2023-06-22 SDOH — ECONOMIC STABILITY: FOOD INSECURITY: WITHIN THE PAST 12 MONTHS, YOU WORRIED THAT YOUR FOOD WOULD RUN OUT BEFORE YOU GOT MONEY TO BUY MORE.: NEVER TRUE

## 2023-06-22 SDOH — ECONOMIC STABILITY: INCOME INSECURITY: HOW HARD IS IT FOR YOU TO PAY FOR THE VERY BASICS LIKE FOOD, HOUSING, MEDICAL CARE, AND HEATING?: NOT HARD AT ALL

## 2023-06-22 SDOH — ECONOMIC STABILITY: FOOD INSECURITY: WITHIN THE PAST 12 MONTHS, THE FOOD YOU BOUGHT JUST DIDN'T LAST AND YOU DIDN'T HAVE MONEY TO GET MORE.: NEVER TRUE

## 2023-06-22 ASSESSMENT — ENCOUNTER SYMPTOMS
SHORTNESS OF BREATH: 1
NAUSEA: 0
WHEEZING: 0
COUGH: 0
TROUBLE SWALLOWING: 0
DIARRHEA: 0
BACK PAIN: 0
CONSTIPATION: 0
VOMITING: 0
BLOOD IN STOOL: 0

## 2023-06-22 NOTE — PROGRESS NOTES
[address] PRIMARY CARE  [address] [address]  Dept: [phone]  Dept Fax: [phone]    Vinny Peacock (:  1960) is a 58 y.o. female,Established patient, here for evaluation of the following chief complaint(s):  6 Month Follow-Up (Medication follow up- HTN/Pt sees Endo for DM)    Vinny Peacock following with endocrinology, last seen 5/15/2023. Increased Humulin U-500 70 units with breakfast to 45 with lunch and 70 with dinner. Did have brief admission to [facility] in April for hypoglycemia. Losartan and carvedilol were discontinued during that stay. Hydralazine 100 mg 3 times daily and clonidine 0.3 mg 3 times daily. Has Nephrology appt on          ASSESSMENT/PLAN:  1. CKD stage 4 secondary to hypertension [facility])  -     [employer]  2. Type 2 diabetes mellitus with diabetic polyneuropathy, with long-term current use of insulin (HCC)  -     atorvastatin (LIPITOR) 80 MG tablet; TAKE ONE TABLET BY MOUTH DAILY AT 9AM, Disp-90 tablet, R-1Normal  -     [address]  3. Essential (primary) hypertension  -     amLODIPine (NORVASC) 10 MG tablet; Take 1 tablet by mouth daily, Disp-90 tablet, R-1Normal  -     [address]  4. Medication refill  -     atorvastatin (LIPITOR) 80 MG tablet; TAKE ONE TABLET BY MOUTH DAILY AT 9AM, Disp-90 tablet, R-1Normal  5. Right hemiparesis [facility])  -     [employer]  6. Urge incontinence  -     [facility] [place]  -     Incontinence Supply Disposable (INCONTINENCE BRIEF LARGE) MISC; EVERY 4-6 HOURS PRN Starting Thu 2023, Until Sat 2023 at 2359, For 30 days, Disp-200 each, R-2, Print  -     Incontinence Supplies MISC; Disp-200 each, R-2, PrintIncontinence pads  7.  Ischemic stroke

## 2023-06-29 ENCOUNTER — TELEPHONE (OUTPATIENT)
Dept: PRIMARY CARE CLINIC | Age: 63
End: 2023-06-29

## 2023-06-30 ENCOUNTER — TELEPHONE (OUTPATIENT)
Dept: PRIMARY CARE CLINIC | Age: 63
End: 2023-06-30

## 2023-06-30 DIAGNOSIS — Z76.0 MEDICATION REFILL: ICD-10-CM

## 2023-06-30 DIAGNOSIS — I10 ESSENTIAL HYPERTENSION: ICD-10-CM

## 2023-06-30 RX ORDER — CLONIDINE HYDROCHLORIDE 0.3 MG/1
0.3 TABLET ORAL 3 TIMES DAILY
Qty: 90 TABLET | Refills: 5 | Status: SHIPPED | OUTPATIENT
Start: 2023-06-30 | End: 2023-06-30 | Stop reason: SDUPTHER

## 2023-06-30 RX ORDER — GLUCOSAMINE HCL/CHONDROITIN SU 500-400 MG
CAPSULE ORAL
Qty: 100 STRIP | Refills: 3 | Status: SHIPPED | OUTPATIENT
Start: 2023-06-30

## 2023-06-30 RX ORDER — CLONIDINE HYDROCHLORIDE 0.1 MG/1
0.1 TABLET ORAL 2 TIMES DAILY
Qty: 60 TABLET | Refills: 0 | Status: CANCELLED | OUTPATIENT
Start: 2023-06-30 | End: 2024-06-30

## 2023-06-30 RX ORDER — CLONIDINE HYDROCHLORIDE 0.3 MG/1
0.3 TABLET ORAL 3 TIMES DAILY
Qty: 90 TABLET | Refills: 0 | Status: SHIPPED | OUTPATIENT
Start: 2023-06-30 | End: 2023-12-27

## 2023-07-03 ENCOUNTER — HOSPITAL ENCOUNTER (OUTPATIENT)
Dept: PHYSICAL THERAPY | Facility: CLINIC | Age: 63
Setting detail: THERAPIES SERIES
Discharge: HOME | End: 2023-07-03
Payer: MEDICARE

## 2023-07-03 PROCEDURE — 97110 THERAPEUTIC EXERCISES: CPT

## 2023-07-03 PROCEDURE — 97161 PT EVAL LOW COMPLEX 20 MIN: CPT

## 2023-07-03 PROCEDURE — 97530 THERAPEUTIC ACTIVITIES: CPT

## 2023-07-03 NOTE — FLOWSHEET NOTE
Anita Fall Risk Assessment    Patient Name:  Karyn Marx  : 1960    Risk Factor Scale  Score   History of Falls [x] Yes  [] No 25  0 25   Secondary Diagnosis [] Yes  [x] No 15  0    Ambulatory Aid [] Furniture  [x] Crutches/cane/walker  [] None/bedrest/wheelchair/nurse 30  15  0 15   IV/Heparin Lock [] Yes  [x] No 20  0 0   Gait/Transferring [] Impaired  [x] Weak  [] Normal/bedrest/immobile 20  10  0 10   Mental Status [] Forgets limitations  [] Oriented to own ability 15  0 0      Total: 50     Based on the Assessment score: check the appropriate box.     []  No intervention needed   Low =   Score of 0-24    []  Use standard prevention interventions Moderate =  Score of 24-44   [] Give patient handout and discuss fall prevention strategies   [] Establish goal of education for patient/family RE: fall prevention strategies    [x]  Use high risk prevention interventions High = Score of 45 and higher   [x] Give patient handout and discuss fall prevention strategies   [x] Establish goal of education for patient/family Re: fall prevention strategies   [x] Discuss lifeline / other resources    Electronically signed by:   Zan Potter PT  Date: 7/3/2023

## 2023-07-03 NOTE — CONSULTS
issues with [] Low back  [] Thoracic  [] Cervical  [] Hip [x] Pelvis [] Other      Symptoms: [] Improving [x] Worsening [] Same      Sleep: [] OK [x] Disturbed       Objective:  Consent: Patient verbally consented to external PFM palpation over clothes on 7/3/2023. Patient was appropriately draped and only areas that were being treated were exposed. Therapist provided detailed explanation of treatment prior to initiation of session. Patient verbalized and demonstrated understanding and provided verbal consent. Consent was checked and received prior to initiating different treatment techniques and checked frequently throughout session. Pt was not prepared for pelvic exam & wishes to defer today & try conservative approach instead. Breathing & Pressure Management:  With external palpation over clothing, patient demonstrates the following with the following cues:  Squeeze pelvic floor muscles - MIN isolated lift of pelvic floor muscles  Transverse abdominis contraction - MAX difficulty with this- able to understand isolated contraction with MAX cues; difficulty bracing with co-contraction of PFM - delayed PFM, better with use of hip adductor overflow muscles   Diaphragmatic breath - MAX dysfunctional, MAX accessory muscle breathing present   MAX increased intraabdominal pressure (IAP) noted with all mobility, MAX cues to ensure exhale  MAX Core instability & difficultly managing IAP noted with B ASLR & bed mobility;  Pt unable to perform SLS d/t R sided hemiparesis     Postural Assessment: sig anterior pelvic tilt     Pelvic Alignment: not tested     Diastasis Rectus Abdominis (JULIENNE): [x] difficult to fully assess due to adipose tissue              Bulge present: [x] none visualized     Abdominal wall assessment:         Scar present [x] Present - 1 total: 6 in vertical scar at lower abdominals: max restriction         Low back: Lumbar mobility screen: WFLs, pain free, WNLs     Strength: history of R side

## 2023-07-03 NOTE — PLAN OF CARE
[] [facility]) - [facility] &  Therapy  [address] [place] [address].  [phone]  F: [phone] [x] [address]  [address]   [address]  P: [phone]  F: [phone] [] [address]  [address]  P: [phone]  F: [phone] [] [facility]  P: [phone]  F: [phone] [] [address]  [address]   [address]   [place]: [phone]  F: [phone]        Physical Therapy Plan of Care    Date:  7/3/2023  Patient: Justino Denson  : 1960  MRN: [id number]  Physician: Irma Barahona,               [facility]urance: [facility] V[employer]  Medical Diagnosis: N39.41 (ICD-10-CM) - Urge incontinence  Rehab Codes: R27.8, R29.3, M62.81, N39.46, M99.05  Onset Date: 23     Next 's appt. : 7/10/23     Subjective:   CC/HPI: Pt is a 57 yo  female who presents with complaints of stress/urge incontinence, urinary frequency/urgency, incomplete voiding, abdominal/PFM weakness/lack of bladder control & constipation. This limits her ability to perform laughing/coughing/sneezing, sit to stand transitions, initiation of walking, lifting, ADLs & social anxiety due to leakage and needing to plan her day & be in close proximity of bathroom around potential for leakage. Pt is on disability, but her prior function was more active, going for walks/biking, transitions & movements with less leakage, taking of care ADLs around the house; however, this has been impacted by urinary & bowel issues. She has a H/O partial hysterectomy. L ovary preserved, ~ 20 years ago. Pt had h/o of stroke 2 x & DM which has caused reduced vision. Pt reports she has a fall history due to low vision & tripping over various objects.  She uses rollator and cane for

## 2023-07-06 ENCOUNTER — TELEPHONE (OUTPATIENT)
Dept: PRIMARY CARE CLINIC | Age: 63
End: 2023-07-06

## 2023-07-06 NOTE — TELEPHONE ENCOUNTER
Vicki from [facility] was calling to give reprt of high BP reading on pt. 204/79 181/74 178/81 194/92. Heart rate has been in the 90s Vicki states she did do a VV with pt and she is using the Bp cuff correctly and she has not been having any symptoms of High Bp with the readings. pt came today and got 156/77.

## 2023-07-11 ENCOUNTER — TELEPHONE (OUTPATIENT)
Dept: PRIMARY CARE CLINIC | Age: 63
End: 2023-07-11

## 2023-07-11 NOTE — TELEPHONE ENCOUNTER
Pt said yes she will try to get someone to  the prescription today once she calls nephrology to get it sent in.  I told her after she takes the medication for about a week or so to f/u with nephrologist.

## 2023-07-11 NOTE — TELEPHONE ENCOUNTER
It appears she was to see her nephrologist yesterday at [facility]. Did they adjust any medication at that visit?

## 2023-07-17 ENCOUNTER — HOSPITAL ENCOUNTER (OUTPATIENT)
Dept: PHYSICAL THERAPY | Facility: CLINIC | Age: 63
Setting detail: THERAPIES SERIES
End: 2023-07-17
Payer: MEDICARE

## 2023-07-17 NOTE — FLOWSHEET NOTE
[] [facility]) - [facility] - [facility] &  Therapy  [address] [facility] [place].    [phone]  F: [phone]   [x] [address] [facility]  [address]   [address]  P: [phone]  F: [phone]  [] [address]  [address]  P: [phone]  F: [phone] [] [facility]  P: [phone]  F: [phone]  [] [address] [facility]  [address]   [address]   [place]: [phone]  F: [phone]   [] [address] [facility]  [address]  [place]: [phone]   F: [phone]     Physical Therapy Cancel/No Show note    Date: 2023  Patient: Aaliyah Stewart  : 1960  MRN: [id number]    Cancels/No Shows to date:     For today's appointment patient:    [x]  Cancelled    [] Rescheduled appointment    [] No-show     Reason given by patient:    []  Patient ill    []  Conflicting appointment    [] No transportation      [] Conflict with work    [] No reason given    [] Weather related    [] COVID-19    [x] Other:      Comments:  Since evaluated pt is now receiving home PT and therefore will not be able to return per insurance until that is completed.  Pt will call and get back on schedule once home PT is complete      [] Next appointment was confirmed    Electronically signed by: Kat Clark PTA

## 2023-07-18 ENCOUNTER — TELEPHONE (OUTPATIENT)
Dept: PRIMARY CARE CLINIC | Age: 63
End: 2023-07-18

## 2023-07-18 NOTE — TELEPHONE ENCOUNTER
Homecare Nurse Guille Matta called to report a blood pressure reading for the client. States she is fine just hasn't taken her meds today but she was getting ready to as we spoke.  Blood pressure reading 172/71

## 2023-07-24 ENCOUNTER — APPOINTMENT (OUTPATIENT)
Dept: PHYSICAL THERAPY | Facility: CLINIC | Age: 63
End: 2023-07-24
Payer: MEDICARE

## 2023-07-28 DIAGNOSIS — Z79.4 TYPE 2 DIABETES MELLITUS WITH DIABETIC POLYNEUROPATHY, WITH LONG-TERM CURRENT USE OF INSULIN (HCC): ICD-10-CM

## 2023-07-28 DIAGNOSIS — E11.42 TYPE 2 DIABETES MELLITUS WITH DIABETIC POLYNEUROPATHY, WITH LONG-TERM CURRENT USE OF INSULIN (HCC): ICD-10-CM

## 2023-07-28 DIAGNOSIS — Z76.0 MEDICATION REFILL: ICD-10-CM

## 2023-07-28 RX ORDER — FENOFIBRATE 67 MG/1
CAPSULE ORAL
Qty: 90 CAPSULE | Refills: 11 | Status: SHIPPED | OUTPATIENT
Start: 2023-07-28

## 2023-07-31 ENCOUNTER — APPOINTMENT (OUTPATIENT)
Dept: PHYSICAL THERAPY | Facility: CLINIC | Age: 63
End: 2023-07-31
Payer: MEDICARE

## 2023-07-31 ENCOUNTER — TELEPHONE (OUTPATIENT)
Dept: PRIMARY CARE CLINIC | Age: 63
End: 2023-07-31

## 2023-07-31 NOTE — TELEPHONE ENCOUNTER
Michelle Johns from [facility] office of again called stating they faxed over a form for a provider signature for the passport waiver program. Forms were  faxed on 7/20/23 checking on status . If we have it can it be faxed again.  If we do not have it we can contact conner to have her resend   Fax [phone]

## 2023-08-01 NOTE — TELEPHONE ENCOUNTER
We do not have form.  Can you call to have them fax it over please Mirvaso Counseling: Mirvaso is a topical medication which can decrease superficial blood flow where applied. Side effects are uncommon and include stinging, redness and allergic reactions.

## 2023-08-03 DIAGNOSIS — I10 ESSENTIAL HYPERTENSION: ICD-10-CM

## 2023-08-03 DIAGNOSIS — Z76.0 MEDICATION REFILL: ICD-10-CM

## 2023-08-03 RX ORDER — CLONIDINE HYDROCHLORIDE 0.3 MG/1
TABLET ORAL
Qty: 90 TABLET | Refills: 2 | Status: SHIPPED | OUTPATIENT
Start: 2023-08-03

## 2023-08-10 ENCOUNTER — HOSPITAL ENCOUNTER (EMERGENCY)
Age: 63
Discharge: HOME | End: 2023-08-10
Attending: EMERGENCY MEDICINE | Admitting: INTERNAL MEDICINE
Payer: MEDICARE

## 2023-08-10 VITALS
BODY MASS INDEX: 40.65 KG/M2 | HEIGHT: 65 IN | HEART RATE: 70 BPM | RESPIRATION RATE: 18 BRPM | DIASTOLIC BLOOD PRESSURE: 68 MMHG | SYSTOLIC BLOOD PRESSURE: 191 MMHG | WEIGHT: 244 LBS | TEMPERATURE: 97.7 F | OXYGEN SATURATION: 98 %

## 2023-08-10 DIAGNOSIS — R73.9 HYPERGLYCEMIA: Primary | ICD-10-CM

## 2023-08-10 PROBLEM — E11.65 TYPE 2 DIABETES MELLITUS WITH HYPERGLYCEMIA (HCC): Status: ACTIVE | Noted: 2023-08-10

## 2023-08-10 LAB
ALBUMIN SERPL-MCNC: 3.4 G/DL (ref 3.5–5.2)
ALP SERPL-CCNC: 270 U/L (ref 35–104)
ALT SERPL-CCNC: 23 U/L (ref 5–33)
ANION GAP SERPL CALCULATED.3IONS-SCNC: 14 MMOL/L (ref 9–17)
AST SERPL-CCNC: 31 U/L
B-OH-BUTYR SERPL-MCNC: 0.47 MMOL/L (ref 0.02–0.27)
BASOPHILS # BLD: 0.03 K/UL (ref 0–0.2)
BASOPHILS NFR BLD: 1 % (ref 0–2)
BILIRUB SERPL-MCNC: 0.3 MG/DL (ref 0.3–1.2)
BUN SERPL-MCNC: 31 MG/DL (ref 8–23)
BUN/CREAT SERPL: 10 (ref 9–20)
CALCIUM SERPL-MCNC: 8.4 MG/DL (ref 8.6–10.4)
CHLORIDE SERPL-SCNC: 99 MMOL/L (ref 98–107)
CO2 SERPL-SCNC: 21 MMOL/L (ref 20–31)
CREAT SERPL-MCNC: 3.2 MG/DL (ref 0.5–0.9)
EOSINOPHIL # BLD: 0.23 K/UL (ref 0–0.44)
EOSINOPHILS RELATIVE PERCENT: 5 % (ref 1–4)
ERYTHROCYTE [DISTWIDTH] IN BLOOD BY AUTOMATED COUNT: 12.7 % (ref 11.8–14.4)
GFR SERPL CREATININE-BSD FRML MDRD: 16 ML/MIN/1.73M2
GLUCOSE BLD-MCNC: 382 MG/DL (ref 65–105)
GLUCOSE BLD-MCNC: 419 MG/DL (ref 65–105)
GLUCOSE BLD-MCNC: 462 MG/DL (ref 65–105)
GLUCOSE BLD-MCNC: 568 MG/DL (ref 65–105)
GLUCOSE SERPL-MCNC: 592 MG/DL (ref 70–99)
HCO3 VENOUS: 24.4 MMOL/L (ref 22–29)
HCT VFR BLD AUTO: 34.4 % (ref 36.3–47.1)
HGB BLD-MCNC: 11.3 G/DL (ref 11.9–15.1)
IMM GRANULOCYTES # BLD AUTO: 0.01 K/UL (ref 0–0.3)
IMM GRANULOCYTES NFR BLD: 0 %
LYMPHOCYTES NFR BLD: 1.86 K/UL (ref 1.1–3.7)
LYMPHOCYTES RELATIVE PERCENT: 37 % (ref 24–43)
MCH RBC QN AUTO: 30.4 PG (ref 25.2–33.5)
MCHC RBC AUTO-ENTMCNC: 32.8 G/DL (ref 28.4–34.8)
MCV RBC AUTO: 92.5 FL (ref 82.6–102.9)
MONOCYTES NFR BLD: 0.59 K/UL (ref 0.1–1.2)
MONOCYTES NFR BLD: 12 % (ref 3–12)
NEGATIVE BASE EXCESS, VEN: 0.5 MMOL/L (ref 0–2)
NEUTROPHILS NFR BLD: 45 % (ref 36–65)
NEUTS SEG NFR BLD: 2.38 K/UL (ref 1.5–8.1)
NRBC BLD-RTO: 0 PER 100 WBC
O2 SAT, VEN: 94.1 % (ref 60–85)
PCO2, VEN: 40 MM HG (ref 41–51)
PH VENOUS: 7.39 (ref 7.32–7.43)
PLATELET # BLD AUTO: 191 K/UL (ref 138–453)
PMV BLD AUTO: 11.6 FL (ref 8.1–13.5)
PO2, VEN: 71.6 MM HG (ref 30–50)
POTASSIUM SERPL-SCNC: 4.6 MMOL/L (ref 3.7–5.3)
PROT SERPL-MCNC: 7.2 G/DL (ref 6.4–8.3)
RBC # BLD AUTO: 3.72 M/UL (ref 3.95–5.11)
SODIUM SERPL-SCNC: 134 MMOL/L (ref 135–144)
TROPONIN I SERPL HS-MCNC: 73 NG/L (ref 0–14)
TROPONIN I SERPL HS-MCNC: 77 NG/L (ref 0–14)
WBC OTHER # BLD: 5.1 K/UL (ref 3.5–11.3)

## 2023-08-10 PROCEDURE — 84484 ASSAY OF TROPONIN QUANT: CPT

## 2023-08-10 PROCEDURE — 96375 TX/PRO/DX INJ NEW DRUG ADDON: CPT

## 2023-08-10 PROCEDURE — 99222 1ST HOSP IP/OBS MODERATE 55: CPT | Performed by: NURSE PRACTITIONER

## 2023-08-10 PROCEDURE — 85025 COMPLETE CBC W/AUTO DIFF WBC: CPT

## 2023-08-10 PROCEDURE — 80053 COMPREHEN METABOLIC PANEL: CPT

## 2023-08-10 PROCEDURE — 82010 KETONE BODYS QUAN: CPT

## 2023-08-10 PROCEDURE — 93005 ELECTROCARDIOGRAM TRACING: CPT | Performed by: EMERGENCY MEDICINE

## 2023-08-10 PROCEDURE — 99284 EMERGENCY DEPT VISIT MOD MDM: CPT

## 2023-08-10 PROCEDURE — 6370000000 HC RX 637 (ALT 250 FOR IP): Performed by: NURSE PRACTITIONER

## 2023-08-10 PROCEDURE — G0378 HOSPITAL OBSERVATION PER HR: HCPCS

## 2023-08-10 PROCEDURE — 2580000003 HC RX 258: Performed by: EMERGENCY MEDICINE

## 2023-08-10 PROCEDURE — 96372 THER/PROPH/DIAG INJ SC/IM: CPT

## 2023-08-10 PROCEDURE — 82947 ASSAY GLUCOSE BLOOD QUANT: CPT

## 2023-08-10 PROCEDURE — 6370000000 HC RX 637 (ALT 250 FOR IP): Performed by: EMERGENCY MEDICINE

## 2023-08-10 PROCEDURE — 96374 THER/PROPH/DIAG INJ IV PUSH: CPT

## 2023-08-10 PROCEDURE — 82803 BLOOD GASES ANY COMBINATION: CPT

## 2023-08-10 PROCEDURE — 96376 TX/PRO/DX INJ SAME DRUG ADON: CPT

## 2023-08-10 PROCEDURE — 6360000002 HC RX W HCPCS: Performed by: EMERGENCY MEDICINE

## 2023-08-10 RX ORDER — AMLODIPINE BESYLATE 5 MG/1
10 TABLET ORAL DAILY
Status: DISCONTINUED | OUTPATIENT
Start: 2023-08-10 | End: 2023-08-10 | Stop reason: HOSPADM

## 2023-08-10 RX ORDER — ENOXAPARIN SODIUM 100 MG/ML
30 INJECTION SUBCUTANEOUS DAILY
Status: CANCELLED | OUTPATIENT
Start: 2023-08-10

## 2023-08-10 RX ORDER — POTASSIUM CHLORIDE 7.45 MG/ML
10 INJECTION INTRAVENOUS PRN
Status: CANCELLED | OUTPATIENT
Start: 2023-08-10

## 2023-08-10 RX ORDER — DEXTROSE MONOHYDRATE 100 MG/ML
INJECTION, SOLUTION INTRAVENOUS CONTINUOUS PRN
Status: CANCELLED | OUTPATIENT
Start: 2023-08-10

## 2023-08-10 RX ORDER — LABETALOL HYDROCHLORIDE 5 MG/ML
20 INJECTION, SOLUTION INTRAVENOUS ONCE
Status: COMPLETED | OUTPATIENT
Start: 2023-08-10 | End: 2023-08-10

## 2023-08-10 RX ORDER — INSULIN LISPRO 100 [IU]/ML
70 INJECTION, SOLUTION INTRAVENOUS; SUBCUTANEOUS ONCE
Status: DISCONTINUED | OUTPATIENT
Start: 2023-08-10 | End: 2023-08-10 | Stop reason: CLARIF

## 2023-08-10 RX ORDER — POLYETHYLENE GLYCOL 3350 17 G/17G
17 POWDER, FOR SOLUTION ORAL DAILY PRN
Status: CANCELLED | OUTPATIENT
Start: 2023-08-10

## 2023-08-10 RX ORDER — ATORVASTATIN CALCIUM 80 MG/1
80 TABLET, FILM COATED ORAL NIGHTLY
Status: CANCELLED | OUTPATIENT
Start: 2023-08-10

## 2023-08-10 RX ORDER — MAGNESIUM SULFATE 1 G/100ML
1000 INJECTION INTRAVENOUS PRN
Status: CANCELLED | OUTPATIENT
Start: 2023-08-10

## 2023-08-10 RX ORDER — FUROSEMIDE 40 MG/1
40 TABLET ORAL 2 TIMES DAILY
Status: CANCELLED | OUTPATIENT
Start: 2023-08-10

## 2023-08-10 RX ORDER — ASPIRIN 81 MG/1
81 TABLET, CHEWABLE ORAL DAILY
Status: CANCELLED | OUTPATIENT
Start: 2023-08-10

## 2023-08-10 RX ORDER — INSULIN LISPRO 100 [IU]/ML
25 INJECTION, SOLUTION INTRAVENOUS; SUBCUTANEOUS ONCE
Status: COMPLETED | OUTPATIENT
Start: 2023-08-10 | End: 2023-08-10

## 2023-08-10 RX ORDER — INSULIN LISPRO 100 [IU]/ML
25 INJECTION, SOLUTION INTRAVENOUS; SUBCUTANEOUS ONCE
Status: DISCONTINUED | OUTPATIENT
Start: 2023-08-10 | End: 2023-08-10

## 2023-08-10 RX ORDER — INSULIN LISPRO 100 [IU]/ML
0-4 INJECTION, SOLUTION INTRAVENOUS; SUBCUTANEOUS NIGHTLY
Status: CANCELLED | OUTPATIENT
Start: 2023-08-10

## 2023-08-10 RX ORDER — ACETAMINOPHEN 325 MG/1
650 TABLET ORAL EVERY 6 HOURS PRN
Status: CANCELLED | OUTPATIENT
Start: 2023-08-10

## 2023-08-10 RX ORDER — POTASSIUM CHLORIDE 20 MEQ/1
40 TABLET, EXTENDED RELEASE ORAL PRN
Status: CANCELLED | OUTPATIENT
Start: 2023-08-10

## 2023-08-10 RX ORDER — INSULIN LISPRO 100 [IU]/ML
0-16 INJECTION, SOLUTION INTRAVENOUS; SUBCUTANEOUS
Status: CANCELLED | OUTPATIENT
Start: 2023-08-10

## 2023-08-10 RX ORDER — HYDRALAZINE HYDROCHLORIDE 20 MG/ML
5 INJECTION INTRAMUSCULAR; INTRAVENOUS ONCE
Status: COMPLETED | OUTPATIENT
Start: 2023-08-10 | End: 2023-08-10

## 2023-08-10 RX ORDER — 0.9 % SODIUM CHLORIDE 0.9 %
1000 INTRAVENOUS SOLUTION INTRAVENOUS ONCE
Status: COMPLETED | OUTPATIENT
Start: 2023-08-10 | End: 2023-08-10

## 2023-08-10 RX ORDER — ACETAMINOPHEN 650 MG/1
650 SUPPOSITORY RECTAL EVERY 6 HOURS PRN
Status: CANCELLED | OUTPATIENT
Start: 2023-08-10

## 2023-08-10 RX ORDER — CLONIDINE HYDROCHLORIDE 0.1 MG/1
0.3 TABLET ORAL 3 TIMES DAILY
Status: DISCONTINUED | OUTPATIENT
Start: 2023-08-10 | End: 2023-08-10 | Stop reason: HOSPADM

## 2023-08-10 RX ORDER — SODIUM CHLORIDE 0.9 % (FLUSH) 0.9 %
5-40 SYRINGE (ML) INJECTION EVERY 12 HOURS SCHEDULED
Status: CANCELLED | OUTPATIENT
Start: 2023-08-10

## 2023-08-10 RX ORDER — SODIUM CHLORIDE 9 MG/ML
INJECTION, SOLUTION INTRAVENOUS PRN
Status: CANCELLED | OUTPATIENT
Start: 2023-08-10

## 2023-08-10 RX ORDER — LOSARTAN POTASSIUM 25 MG/1
25 TABLET ORAL DAILY
Status: DISCONTINUED | OUTPATIENT
Start: 2023-08-10 | End: 2023-08-10 | Stop reason: HOSPADM

## 2023-08-10 RX ORDER — ONDANSETRON 4 MG/1
4 TABLET, ORALLY DISINTEGRATING ORAL EVERY 8 HOURS PRN
Status: CANCELLED | OUTPATIENT
Start: 2023-08-10

## 2023-08-10 RX ORDER — HYDRALAZINE HYDROCHLORIDE 25 MG/1
25 TABLET, FILM COATED ORAL EVERY 8 HOURS SCHEDULED
Status: DISCONTINUED | OUTPATIENT
Start: 2023-08-10 | End: 2023-08-10 | Stop reason: HOSPADM

## 2023-08-10 RX ORDER — ONDANSETRON 2 MG/ML
4 INJECTION INTRAMUSCULAR; INTRAVENOUS EVERY 6 HOURS PRN
Status: CANCELLED | OUTPATIENT
Start: 2023-08-10

## 2023-08-10 RX ORDER — SODIUM CHLORIDE 0.9 % (FLUSH) 0.9 %
10 SYRINGE (ML) INJECTION PRN
Status: CANCELLED | OUTPATIENT
Start: 2023-08-10

## 2023-08-10 RX ORDER — SODIUM CHLORIDE 9 MG/ML
INJECTION, SOLUTION INTRAVENOUS CONTINUOUS
Status: CANCELLED | OUTPATIENT
Start: 2023-08-10

## 2023-08-10 RX ADMIN — INSULIN HUMAN 10 UNITS: 100 INJECTION, SOLUTION PARENTERAL at 11:27

## 2023-08-10 RX ADMIN — SODIUM CHLORIDE 1000 ML: 9 INJECTION, SOLUTION INTRAVENOUS at 10:50

## 2023-08-10 RX ADMIN — LABETALOL HYDROCHLORIDE 20 MG: 5 INJECTION, SOLUTION INTRAVENOUS at 10:51

## 2023-08-10 RX ADMIN — INSULIN LISPRO 25 UNITS: 100 INJECTION, SOLUTION INTRAVENOUS; SUBCUTANEOUS at 17:03

## 2023-08-10 RX ADMIN — HYDRALAZINE HYDROCHLORIDE 5 MG: 20 INJECTION, SOLUTION INTRAMUSCULAR; INTRAVENOUS at 11:39

## 2023-08-10 RX ADMIN — INSULIN LISPRO 25 UNITS: 100 INJECTION, SOLUTION INTRAVENOUS; SUBCUTANEOUS at 16:11

## 2023-08-10 RX ADMIN — INSULIN HUMAN 8 UNITS: 100 INJECTION, SOLUTION PARENTERAL at 13:01

## 2023-08-10 RX ADMIN — INSULIN LISPRO 25 UNITS: 100 INJECTION, SOLUTION INTRAVENOUS; SUBCUTANEOUS at 14:51

## 2023-08-10 ASSESSMENT — PAIN - FUNCTIONAL ASSESSMENT: PAIN_FUNCTIONAL_ASSESSMENT: 0-10

## 2023-08-10 ASSESSMENT — PAIN SCALES - GENERAL: PAINLEVEL_OUTOF10: 0

## 2023-08-10 NOTE — ED PROVIDER NOTES
EMERGENCY DEPARTMENT ENCOUNTER    Pt Name: Lula Mariano  MRN: [id number]  [address] [facility] [place] 1960  Date of evaluation: 8/10/23  CHIEF COMPLAINT       Chief Complaint   Patient presents with    Hypertension    Hyperglycemia     HISTORY OF PRESENT ILLNESS   80-year-old female presents emergency room for hypertension and hyperglycemia. Patient had a visit at [facility] today for evaluation for fistula. She was supposed to be getting the procedure today. They had checked her blood pressure and blood sugar blood sugar was over 500 patient brought here to the emergency room. Patient is a diabetic. She uses high doses of insulin at home. She reports she is currently asymptomatic. REVIEW OF SYSTEMS     Review of Systems   All other systems reviewed and are negative. PASTMEDICAL HISTORY     Past Medical History:   Diagnosis Date    GERD (gastroesophageal reflux disease)     Heart murmur     Herpes infection     HSIL on Pap smear of cervix     Hyperlipidemia     Hypertension     Obesity 04/21/2014    Osteoarthritis     Peripheral neuropathy 05/22/2014    Renal insufficiency 08/03/2017    Type II or unspecified type diabetes mellitus without mention of complication, not stated as uncontrolled      Past Problem List  Patient Active Problem List   Diagnosis Code    Malignant neoplasm of uterus ([address]) C55    Acid reflux K21.9    Heart murmur R01.1    Class 3 severe obesity due to excess calories with serious comorbidity and body mass index (BMI) of 40.0 to 44.9 in adult ([address]) E66.01, Z68.41    Type 2 diabetes mellitus treated with insulin (HCC) E11.9, Z79.4    Microalbuminuria R80.9    Noncompliance with treatment Z91.199    Hypokalemia E87.6    Lymphedema of leg I89.0    Primary osteoarthritis of both knees M17.0    Bad memory R41.3    Unsteady gait R26.81    Calculus of gallbladder K80.20    Weakness of left side of body R53.1    AF (amaurosis fugax) G45. 3    Chronic kidney disease N18.9    Abnormal liver enzymes following components:    POC Glucose 568 (*)     All other components within normal limits   VENOUS BLOOD GAS, POINT OF CARE - Abnormal; Notable for the following components:    pCO2, [place]ullen 40.0 (*)     pO2, [place] 71.6 (*)     O2 Sat, [place] 94.1 (*)     All other components within normal limits   POC GLUCOSE FINGERSTICK - Abnormal; Notable for the following components:    POC Glucose 462 (*)     All other components within normal limits   POC GLUCOSE FINGERSTICK - Abnormal; Notable for the following components:    POC Glucose 419 (*)     All other components within normal limits   POC GLUCOSE FINGERSTICK - Abnormal; Notable for the following components:    POC Glucose 382 (*)     All other components within normal limits   POCT GLUCOSE       Vitals Reviewed:    Vitals:    08/10/23 1200 08/10/23 1215 08/10/23 1230 08/10/23 1708   BP: (!) 190/72 (!) 142/68 (!) 163/72 (!) 191/68   Pulse:    70   Resp:       Temp:       SpO2:       Weight:       Height:         MEDICATIONS GIVEN TO PATIENT THIS ENCOUNTER:  Orders Placed This Encounter   Medications    sodium chloride 0.9 % bolus 1,000 mL    labetalol (NORMODYNE;TRANDATE) injection 20 mg    insulin regular (HUMULIN R;NOVOLIN R) injection 10 Units    hydrALAZINE (APRESOLINE) injection 5 mg    insulin regular (HUMULIN R;NOVOLIN R) injection 8 Units    DISCONTD: Insulin NPH Isophane & Regular (HUMULIN;NOVOLIN) (70-30) 100 UNIT per ML injection pen 40 Units     Order Specific Question:   Pharmacy to convert mixed insulin therapy to Basal / Bolus dosing per hospital protocol.      Answer:   Yes    insulin lispro (HUMALOG) injection vial 25 Units    insulin lispro (HUMALOG) injection vial 25 Units    amLODIPine (NORVASC) tablet 10 mg    cloNIDine (CATAPRES) tablet 0.3 mg    losartan (COZAAR) tablet 25 mg    DISCONTD: insulin lispro (HUMALOG) injection vial 25 Units    DISCONTD: insulin lispro (HUMALOG) injection vial 25 Units    insulin lispro (HUMALOG) injection vial 25 Units

## 2023-08-10 NOTE — ED TRIAGE NOTES
Pt brought in by EMS from [facility] clinic where she was to have a fistula placed this morning. Pt's BS was over 600 for [facility], and had HTN, they wanted pt evaluated at ER prior to procedure. Pt reports she took her BP meds but did not know she should have taken her diabetes meds.

## 2023-08-10 NOTE — ED NOTES
ED to inpatient nurses report     Chief Complaint   Patient presents with    Hypertension    Hyperglycemia      Present to ED from [facility] for elevated BP and blood sugar  LOC: alert and orientated to name, place, date  Vital signs   Vitals:    08/10/23 1200 08/10/23 1215 08/10/23 1230 08/10/23 1708   BP: (!) 190/72 (!) 142/68 (!) 163/72 (!) 191/68   Pulse:    70   Resp:       Temp:       SpO2:       Weight:       Height:          Oxygen Baseline     Current needs required    SEPSIS:   [] Lactate X 2 ordered (Yes or No)  [] Antibiotics given (Yes or No)  [] IV Fluids ordered (Yes or No)             [] 2nd IV completed (Yes or No)  [] Hourly Vital Signs (Validated)  [] Outstanding Orders:     LDAs:   Peripheral IV 08/10/23 Right Hand (Active)   Site Assessment Clean, dry & intact 08/10/23 0954   Line Status Blood return noted 08/10/23 4240     Mobility: Independent  Fall Risk: [place] 1 Fall Risk  Presents to emergency department  because of falls (Syncope, seizure, or loss of consciousness): No (08/10/23 0944)  Age > 79: No (08/10/23 0944)  Altered Mental Status, Intoxication with alcohol or substance confusion (Disorientation, impaired judgment, poor safety awaremess, or inability to follow instructions): No (08/10/23 0944)  Impaired Mobility: Ambulates or transfers with assistive devices or assistance;  Unable to ambulate or transer.: Yes (08/10/23 1931)  Nursing Judgement: Yes (08/10/23 0944)  Pending ED orders:   Present condition:   Code Status:   Consults: IP CONSULT TO INTERNAL MEDICINE  IP CONSULT TO INTERNAL MEDICINE  []  Hospitalist  Completed  [] yes [] no Who:   []  Medicine  Completed  [] yes [] No Who:   []  Cardiology  Completed  [] yes [] No Who:   []  GI   Completed  [] yes [] No Who:   []  Neurology  Completed  [] yes [] No Who:   []  Nephrology Completed  [] yes [] No Who:    []  Vascular  Completed  [] yes [] No Who:   []  Ortho  Completed  [] yes [] No Who:     []  Surgery  Completed  [] yes

## 2023-08-10 NOTE — CONSULTS
[facility]  Office: [id number]  1826, DO, Felicia Gabriel, DO, Wilman Taylor, DO, Deirdre Lopez, DO, Liana Yoon MD, Roseann Lainez MD, Romero Pérez MD, Hortencia Taylor MD,  Kendy Black MD, Shyam Flannery MD, Sirisha Lugo, DO, Danae Mcgrath MD,  Cooper Rodriguez MD, Db Malin MD, Ivone Modi, DO, Eve Guillen MD,  Bettie Chaves DO, Tate Fitzpatrick MD, Aida Herman MD, Jorge Wiggins MD, Renny Keller MD,  Alexandrea Marquez MD, Mireya Jc MD, Anna Camacho MD, Rabon Kocher, DO, Malcolm Stoddard MD,  Vineet Nix MD, Idris Weathers, CNP,  Denis Proctor, CNP, Dorie Decker, CNP, Mac Betancourt, CNP,  Hugo Nash, [facility], Margot Goncalves, CNP, Temo Sotomayor, CNP, Sundeep Uriarte, CNP, Mary Grace Schulz, CNP, Zarina Santiago, CNP, Andrey Calhoun PA-C, Paolo Vargas, CNS, Kamran Vaca, CNP, Carolyn Beltrán, [address] / HISTORY AND PHYSICAL EXAMINATION            Date:   8/10/2023  Patient name:  Alla Ervin  Date of admission:  8/10/2023  9:39 AM  MRN:   [id number]  Account:  [de-identified]  YOB: 1960  PCP:    KLARISSA Pearson CNP  Room:   [place] [address]  Code Status:    No Order    Physician Requesting Consult: Duyen Lopez DO    Reason for Consult: Possible admission    Chief Complaint:     Chief Complaint   Patient presents with    Hypertension    Hyperglycemia       History Obtained From:     patient    History of Present Illness:     Called for possible admission. Patient is on 185 units of U-500 daily divided in a 70 - 45 - 70. She was scheduled to have an outpatient fistula placed today for initiation of dialysis. Patient is n.p.o. for this procedure so she stop all insulin. Preop the patient was found to have significantly elevated blood sugars at greater than 600. She was sent to the emergency department.   Patient has no complaints Venous Blood Gas, POC    Collection Time: 08/10/23 11:04 AM   Result Value Ref Range    pH, Cullen 7.393 7.320 - 7.430    pCO2, Cullen 40.0 (L) 41.0 - 51.0 mm Hg    pO2, Cullen 71.6 (H) 30.0 - 50.0 mm Hg    HCO3, Venous 24.4 22.0 - 29.0 mmol/L    Negative Base Excess, Cullen 0.5 0.0 - 2.0 mmol/L    O2 Sat, Cullen 94.1 (H) 60.0 - 85.0 %   Troponin    Collection Time: 08/10/23 11:55 AM   Result Value Ref Range    Troponin, High Sensitivity 73 (HH) 0 - 14 ng/L   POC Glucose Fingerstick    Collection Time: 08/10/23 12:33 PM   Result Value Ref Range    POC Glucose 462 (HH) 65 - 105 mg/dL   POC Glucose Fingerstick    Collection Time: 08/10/23  3:52 PM   Result Value Ref Range    POC Glucose 419 (HH) 65 - 105 mg/dL       Imaging/Diagonstics:  No results found.     Assessment :      Hospital Problems             Last Modified POA    * (Principal) Type 2 diabetes mellitus with hyperglycemia ([address]) 8/10/2023 Yes       Plan:     Type 2 diabetes with hyperglycemia  Patient is on U-500 on 70 - 45- 70 and is 65 units behind  Administer additional 25 units subcu Humalog now  Wait 1 hour administer 70 additional units of U500 with supper  If blood sugar returns to the patient's baseline of between 120-300 she can be discharged      Consultations:   [facility], KLARISSA Villar NP  8/10/2023  4:49 PM    Copy sent to Dr. Arne Mortimer, APRDARIEL - CNP

## 2023-08-10 NOTE — DISCHARGE INSTRUCTIONS
Take your normal insulin dose tonight with dinner. Check glucose at home tonight. If glucose is above 400 I do recommend return to the emergency room.

## 2023-08-11 ENCOUNTER — TELEPHONE (OUTPATIENT)
Dept: PRIMARY CARE CLINIC | Age: 63
End: 2023-08-11

## 2023-08-11 LAB
EKG ATRIAL RATE: 68 BPM
EKG P AXIS: 55 DEGREES
EKG P-R INTERVAL: 216 MS
EKG Q-T INTERVAL: 494 MS
EKG QRS DURATION: 158 MS
EKG QTC CALCULATION (BAZETT): 525 MS
EKG R AXIS: -75 DEGREES
EKG T AXIS: 57 DEGREES
EKG VENTRICULAR RATE: 68 BPM

## 2023-08-11 NOTE — TELEPHONE ENCOUNTER
Boston Guillen called from [employer] looking for a paper that needed a signature that was sent over on July 30th. She stated she had called on the 31st and someone told her we would call her back when we seen we had it. Boston Guillen stated she never got a call back. I told her we have not received it and to re-fax it ATTN: Gloria Quintero and I will get it signed and faxed over.

## 2023-08-15 LAB — GLUCOSE BLD-MCNC: 416 MG/DL (ref 65–105)

## 2023-08-24 DIAGNOSIS — E11.42 TYPE 2 DIABETES MELLITUS WITH DIABETIC POLYNEUROPATHY, WITH LONG-TERM CURRENT USE OF INSULIN (HCC): ICD-10-CM

## 2023-08-24 DIAGNOSIS — Z76.0 MEDICATION REFILL: ICD-10-CM

## 2023-08-24 DIAGNOSIS — Z79.4 TYPE 2 DIABETES MELLITUS WITH DIABETIC POLYNEUROPATHY, WITH LONG-TERM CURRENT USE OF INSULIN (HCC): ICD-10-CM

## 2023-08-25 RX ORDER — ATORVASTATIN CALCIUM 80 MG/1
TABLET, FILM COATED ORAL
Qty: 90 TABLET | Refills: 1 | Status: SHIPPED | OUTPATIENT
Start: 2023-08-25

## 2023-08-28 DIAGNOSIS — Z76.0 MEDICATION REFILL: ICD-10-CM

## 2023-08-28 DIAGNOSIS — K21.00 GASTROESOPHAGEAL REFLUX DISEASE WITH ESOPHAGITIS, UNSPECIFIED WHETHER HEMORRHAGE: ICD-10-CM

## 2023-08-28 RX ORDER — FAMOTIDINE 20 MG/1
TABLET, FILM COATED ORAL
Qty: 90 TABLET | Refills: 11 | Status: SHIPPED | OUTPATIENT
Start: 2023-08-28

## 2023-08-31 ENCOUNTER — OFFICE VISIT (OUTPATIENT)
Dept: PRIMARY CARE CLINIC | Age: 63
End: 2023-08-31
Payer: MEDICARE

## 2023-08-31 VITALS
BODY MASS INDEX: 43.43 KG/M2 | SYSTOLIC BLOOD PRESSURE: 152 MMHG | WEIGHT: 261 LBS | OXYGEN SATURATION: 100 % | DIASTOLIC BLOOD PRESSURE: 77 MMHG | HEART RATE: 84 BPM

## 2023-08-31 DIAGNOSIS — E13.9 LADA (LATENT AUTOIMMUNE DIABETES IN ADULTS), MANAGED AS TYPE 1 (HCC): Primary | ICD-10-CM

## 2023-08-31 DIAGNOSIS — I10 ESSENTIAL (PRIMARY) HYPERTENSION: ICD-10-CM

## 2023-08-31 PROCEDURE — 2022F DILAT RTA XM EVC RTNOPTHY: CPT | Performed by: NURSE PRACTITIONER

## 2023-08-31 PROCEDURE — 3077F SYST BP >= 140 MM HG: CPT | Performed by: NURSE PRACTITIONER

## 2023-08-31 PROCEDURE — 99213 OFFICE O/P EST LOW 20 MIN: CPT | Performed by: NURSE PRACTITIONER

## 2023-08-31 PROCEDURE — 3078F DIAST BP <80 MM HG: CPT | Performed by: NURSE PRACTITIONER

## 2023-08-31 PROCEDURE — 1036F TOBACCO NON-USER: CPT | Performed by: NURSE PRACTITIONER

## 2023-08-31 PROCEDURE — G8417 CALC BMI ABV UP PARAM F/U: HCPCS | Performed by: NURSE PRACTITIONER

## 2023-08-31 PROCEDURE — G8427 DOCREV CUR MEDS BY ELIG CLIN: HCPCS | Performed by: NURSE PRACTITIONER

## 2023-08-31 PROCEDURE — 3046F HEMOGLOBIN A1C LEVEL >9.0%: CPT | Performed by: NURSE PRACTITIONER

## 2023-08-31 PROCEDURE — 3017F COLORECTAL CA SCREEN DOC REV: CPT | Performed by: NURSE PRACTITIONER

## 2023-08-31 RX ORDER — LOSARTAN POTASSIUM 100 MG/1
TABLET ORAL
COMMUNITY
Start: 2023-07-12

## 2023-08-31 RX ORDER — HYDRALAZINE HYDROCHLORIDE 100 MG/1
100 TABLET, FILM COATED ORAL 2 TIMES DAILY
COMMUNITY

## 2023-08-31 RX ORDER — FERROUS SULFATE 325(65) MG
325 TABLET ORAL
COMMUNITY

## 2023-08-31 RX ORDER — FUROSEMIDE 80 MG
40 TABLET ORAL 2 TIMES DAILY
Qty: 30 TABLET | Refills: 0 | Status: SHIPPED | OUTPATIENT
Start: 2023-08-31

## 2023-08-31 RX ORDER — AMMONIUM LACTATE 12 G/100G
CREAM TOPICAL PRN
COMMUNITY

## 2023-08-31 RX ORDER — GLUCOSAMINE HCL/CHONDROITIN SU 500-400 MG
1 CAPSULE ORAL 4 TIMES DAILY
Qty: 300 STRIP | Refills: 0 | Status: SHIPPED | OUTPATIENT
Start: 2023-08-31 | End: 2023-09-30

## 2023-08-31 RX ORDER — FUROSEMIDE 80 MG
40 TABLET ORAL 2 TIMES DAILY
Qty: 90 TABLET | Refills: 1 | Status: SHIPPED | OUTPATIENT
Start: 2023-08-31 | End: 2023-08-31 | Stop reason: SDUPTHER

## 2023-08-31 NOTE — PROGRESS NOTES
[address] [facility] PRIMARY CARE  [id number] [facility] [address]  Dept: [phone]  Dept Fax: [phone]    Lei Honeycutt (:  1960) is a 58 y.o. female,Established patient, here for evaluation of the following chief complaint(s):  Follow-Up from Hospital (Patient is here for a hospital follow up.)    Lei Honeycutt has had 2 visits to the emergency room for hyperglycemia, sugars greater than 500 in the last 2 weeks. [facility]. Last seen in May with dose adjustment as follows: Increase Humulin R u500 to 70 units with breakfast, 45 units with lunch, 70 units with supper         ASSESSMENT/PLAN:  1. 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 Essential (primary) hypertension  -     furosemide (LASIX) 80 MG tablet; Take 0.5 tablets by mouth 2 times daily, Disp-30 tablet, R-0Normal    No further episodes of hyperglycemia. She does have assistance this week with placing her CGM. Also has follow-up on the  with endocrinology. Patient did have losartan dosing adjusted by nephrology, now 100 mg daily. Medication list updated. Return in about 3 months (around 2023) for Diabetes, HTN. Subjective   SUBJECTIVE/OBJECTIVE:  Sugars today at home was 163 before breakfast, does not recall yesterday. She admits she does also have low blood sugars, sometimes down to the 40s. Was diagnosed with adult onset type I diabetic after moving to [address] [place] [address]. Has not been wearing Dexcom, niece left [facility] and she has not help applying her sensor since   Home health nurse plans to help her place the device this week.     Now on dialysis

## 2023-09-11 RX ORDER — ASPIRIN 81 MG
81 TABLET,CHEWABLE ORAL DAILY
Qty: 90 TABLET | Refills: 1 | Status: SHIPPED | OUTPATIENT
Start: 2023-09-11

## 2023-09-11 NOTE — TELEPHONE ENCOUNTER
Pt has been informed Solaraze Counseling:  I discussed with the patient the risks of Solaraze including but not limited to erythema, scaling, itching, weeping, crusting, and pain.

## 2023-09-18 DIAGNOSIS — I10 ESSENTIAL (PRIMARY) HYPERTENSION: ICD-10-CM

## 2023-09-18 RX ORDER — AMLODIPINE BESYLATE 10 MG/1
10 TABLET ORAL DAILY
Qty: 90 TABLET | Refills: 1 | Status: SHIPPED | OUTPATIENT
Start: 2023-09-18 | End: 2024-03-16

## 2023-09-18 RX ORDER — ACETAMINOPHEN 160 MG
TABLET,DISINTEGRATING ORAL DAILY
OUTPATIENT
Start: 2023-09-18

## 2023-09-20 RX ORDER — LATANOPROST 50 UG/ML
1 SOLUTION/ DROPS OPHTHALMIC DAILY
OUTPATIENT
Start: 2023-09-20

## 2023-09-20 RX ORDER — MULTIVIT WITH MINERALS/LUTEIN
1000 TABLET ORAL DAILY
Qty: 30 TABLET | OUTPATIENT
Start: 2023-09-20

## 2023-09-20 RX ORDER — ASPIRIN 81 MG/1
81 TABLET, CHEWABLE ORAL DAILY
Qty: 90 TABLET | Refills: 1 | OUTPATIENT
Start: 2023-09-20

## 2023-09-20 RX ORDER — DOCUSATE SODIUM 100 MG/1
100 CAPSULE, LIQUID FILLED ORAL 2 TIMES DAILY
Qty: 30 CAPSULE | Refills: 5 | Status: SHIPPED | OUTPATIENT
Start: 2023-09-20

## 2023-09-20 NOTE — TELEPHONE ENCOUNTER
Deya from drug store [facility] called and states pt is switching over to them and needing 4 refills as the scripts sent from previous pharm did not have refills on there. LOV 8/31/2023.

## 2023-09-28 DIAGNOSIS — I10 ESSENTIAL (PRIMARY) HYPERTENSION: ICD-10-CM

## 2023-09-28 NOTE — TELEPHONE ENCOUNTER
Soheila Scott is calling to request a refill on the following medication(s):    Medication Request:  Requested Prescriptions     Pending Prescriptions Disp Refills    furosemide (LASIX) 80 MG tablet 30 tablet 0     Sig: Take 0.5 tablets by mouth 2 times daily       Last Visit Date (If Applicable):  9/87/1002    Next Visit Date:    Visit date not found

## 2023-10-02 RX ORDER — FUROSEMIDE 80 MG
40 TABLET ORAL 2 TIMES DAILY
Qty: 30 TABLET | Refills: 0 | Status: SHIPPED | OUTPATIENT
Start: 2023-10-02

## 2023-11-16 RX ORDER — DOCUSATE SODIUM 100 MG/1
100 CAPSULE, LIQUID FILLED ORAL 2 TIMES DAILY
Qty: 56 CAPSULE | Refills: 5 | OUTPATIENT
Start: 2023-11-16

## 2023-11-22 ENCOUNTER — OFFICE VISIT (OUTPATIENT)
Dept: PRIMARY CARE CLINIC | Age: 63
End: 2023-11-22
Payer: MEDICARE

## 2023-11-22 VITALS
SYSTOLIC BLOOD PRESSURE: 144 MMHG | HEART RATE: 83 BPM | WEIGHT: 250 LBS | DIASTOLIC BLOOD PRESSURE: 60 MMHG | OXYGEN SATURATION: 100 % | BODY MASS INDEX: 41.6 KG/M2

## 2023-11-22 DIAGNOSIS — Z12.31 ENCOUNTER FOR SCREENING MAMMOGRAM FOR BREAST CANCER: ICD-10-CM

## 2023-11-22 DIAGNOSIS — N39.41 URGE INCONTINENCE: ICD-10-CM

## 2023-11-22 DIAGNOSIS — I63.9 ISCHEMIC STROKE (HCC): Primary | ICD-10-CM

## 2023-11-22 DIAGNOSIS — Z13.220 SCREENING FOR HYPERLIPIDEMIA: ICD-10-CM

## 2023-11-22 DIAGNOSIS — E13.9 LADA (LATENT AUTOIMMUNE DIABETES IN ADULTS), MANAGED AS TYPE 1 (HCC): ICD-10-CM

## 2023-11-22 PROCEDURE — 2022F DILAT RTA XM EVC RTNOPTHY: CPT | Performed by: NURSE PRACTITIONER

## 2023-11-22 PROCEDURE — G8417 CALC BMI ABV UP PARAM F/U: HCPCS | Performed by: NURSE PRACTITIONER

## 2023-11-22 PROCEDURE — 3017F COLORECTAL CA SCREEN DOC REV: CPT | Performed by: NURSE PRACTITIONER

## 2023-11-22 PROCEDURE — 99214 OFFICE O/P EST MOD 30 MIN: CPT | Performed by: NURSE PRACTITIONER

## 2023-11-22 PROCEDURE — G8484 FLU IMMUNIZE NO ADMIN: HCPCS | Performed by: NURSE PRACTITIONER

## 2023-11-22 PROCEDURE — 3046F HEMOGLOBIN A1C LEVEL >9.0%: CPT | Performed by: NURSE PRACTITIONER

## 2023-11-22 PROCEDURE — 3078F DIAST BP <80 MM HG: CPT | Performed by: NURSE PRACTITIONER

## 2023-11-22 PROCEDURE — G8427 DOCREV CUR MEDS BY ELIG CLIN: HCPCS | Performed by: NURSE PRACTITIONER

## 2023-11-22 PROCEDURE — 3077F SYST BP >= 140 MM HG: CPT | Performed by: NURSE PRACTITIONER

## 2023-11-22 PROCEDURE — 1036F TOBACCO NON-USER: CPT | Performed by: NURSE PRACTITIONER

## 2023-11-22 RX ORDER — NIFEDIPINE 90 MG/1
TABLET, EXTENDED RELEASE ORAL
COMMUNITY
Start: 2023-11-16

## 2023-11-22 RX ORDER — UNDERPADS 23" X 36"
1 EACH MISCELLANEOUS
Qty: 200 EACH | Refills: 2 | Status: SHIPPED | OUTPATIENT
Start: 2023-11-22 | End: 2023-12-22

## 2023-11-22 RX ORDER — HYDRALAZINE HYDROCHLORIDE 50 MG/1
TABLET, FILM COATED ORAL
COMMUNITY
Start: 2023-11-16

## 2023-11-22 RX ORDER — CARVEDILOL 25 MG/1
TABLET ORAL
COMMUNITY
Start: 2023-11-16

## 2023-11-22 RX ORDER — SEVELAMER CARBONATE 800 MG/1
1 TABLET, FILM COATED ORAL
COMMUNITY
Start: 2023-11-06

## 2023-11-22 ASSESSMENT — ENCOUNTER SYMPTOMS
PHOTOPHOBIA: 0
BLOOD IN STOOL: 0
WHEEZING: 0
COUGH: 0
DIARRHEA: 0
BACK PAIN: 0
EYE ITCHING: 0
NAUSEA: 0
CONSTIPATION: 0
VOMITING: 0
TROUBLE SWALLOWING: 0
SHORTNESS OF BREATH: 1

## 2023-11-22 NOTE — PROGRESS NOTES
[address] [facility] PRIMARY CARE  [address] [address] [facility] [address]  Dept: [phone]  Dept Fax: [phone]    Karyn Marx (:  1960) is a 61 y.o. female,Established patient, here for evaluation of the following chief complaint(s): Other (Patient is here to get paper for Mammogram.)    Karyn Marx is a 70-year-old female here today for follow-up for type 1 diabetes, stage IV CKD, and hypertension. History of ischemic stroke, right hemiparesis, and urge incontinence. Patient established with [facility] endocrinology for diabetes       ASSESSMENT/PLAN:  1. Ischemic stroke ([address] [facility])  -     Incontinence Supply Disposable (INCONTINENCE BRIEF LARGE) MISC; EVERY 4-6 HOURS PRN Starting 2023, Until 2023 at 2359, For 30 days, Disp-200 each, R-2, Print  -     Incontinence Supplies MISC; Disp-200 each, R-2, PrintIncontinence pads  2. Encounter for screening mammogram for breast cancer  -     EDITH Digital Screen Bilateral; Future  3. Screening for hyperlipidemia  -     Microalbumin, Ur; Future  -     Lipid Panel; Future  4. 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 Urge incontinence  -     Incontinence Supply Disposable (INCONTINENCE BRIEF LARGE) MISC; EVERY 4-6 HOURS PRN Starting 2023, Until 2023 at 2359, For 30 days, Disp-200 each, R-2, Print  -     Incontinence Supplies MISC; Disp-200 each, R-2, PrintIncontinence pads    Send incontinent supplies to [facility]. No medication changes today. Patient agrees to call endocrinology on Friday to schedule follow-up. Return in about 3 months (around 2024) for Diabetes, HTN. Subjective   SUBJECTIVE/OBJECTIVE:  Karyn Marx presents today for routine follow-up. She states she has not followed up with endocrinology since her last visit. Denies any episodes of hypoglycemia.     Asking for incontinence supplies, says she has

## 2023-12-13 RX ORDER — DOCUSATE SODIUM 100 MG/1
100 CAPSULE, LIQUID FILLED ORAL 2 TIMES DAILY
Qty: 56 CAPSULE | Refills: 5 | Status: SHIPPED | OUTPATIENT
Start: 2023-12-13

## 2024-02-29 ENCOUNTER — OFFICE VISIT (OUTPATIENT)
Dept: PRIMARY CARE CLINIC | Age: 64
End: 2024-02-29
Payer: MEDICARE

## 2024-02-29 VITALS
HEART RATE: 63 BPM | DIASTOLIC BLOOD PRESSURE: 65 MMHG | OXYGEN SATURATION: 99 % | BODY MASS INDEX: 41.93 KG/M2 | SYSTOLIC BLOOD PRESSURE: 149 MMHG | WEIGHT: 252 LBS

## 2024-02-29 DIAGNOSIS — H10.32 ACUTE BACTERIAL CONJUNCTIVITIS OF LEFT EYE: Primary | ICD-10-CM

## 2024-02-29 PROBLEM — E11.3513 PROLIFERATIVE DIABETIC RETINOPATHY OF BOTH EYES WITH MACULAR EDEMA ASSOCIATED WITH TYPE 2 DIABETES MELLITUS (HCC): Status: ACTIVE | Noted: 2021-08-23

## 2024-02-29 PROBLEM — I95.9 HYPOTENSION, UNSPECIFIED: Status: ACTIVE | Noted: 2022-11-08

## 2024-02-29 PROBLEM — N18.6 END STAGE RENAL DISEASE (HCC): Status: ACTIVE | Noted: 2022-11-10

## 2024-02-29 PROBLEM — R41.82 ALTERED MENTAL STATUS: Status: ACTIVE | Noted: 2022-10-28

## 2024-02-29 PROBLEM — H43.393 VITREOUS SYNERESIS OF BOTH EYES: Status: ACTIVE | Noted: 2021-08-23
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
## 2024-02-29 PROBLEM — I77.0 AVF (ARTERIOVENOUS FISTULA) (HCC): Status: ACTIVE | Noted: 2022-12-22

## 2024-02-29 PROBLEM — Z99.2 END-STAGE RENAL DISEASE ON HEMODIALYSIS (HCC): Status: ACTIVE | Noted: 2024-01-07

## 2024-02-29 PROBLEM — N18.6 END-STAGE RENAL DISEASE ON HEMODIALYSIS (HCC): Status: ACTIVE | Noted: 2024-01-07

## 2024-02-29 PROBLEM — H25.12 AGE-RELATED NUCLEAR CATARACT OF LEFT EYE: Status: ACTIVE | Noted: 2021-08-23

## 2024-02-29 PROBLEM — R47.9 UNSPECIFIED SPEECH DISTURBANCES: Status: ACTIVE | Noted: 2022-10-28

## 2024-02-29 PROBLEM — K57.32 SIGMOID DIVERTICULITIS: Status: ACTIVE | Noted: 2024-01-07

## 2024-02-29 PROBLEM — H52.4 PRESBYOPIA: Status: ACTIVE | Noted: 2021-08-23

## 2024-02-29 PROBLEM — I95.1 ORTHOSTATIC HYPOTENSION: Status: ACTIVE | Noted: 2022-11-06

## 2024-02-29 PROBLEM — N18.9 CHRONIC KIDNEY DISEASE, UNSPECIFIED: Status: ACTIVE | Noted: 2022-11-14

## 2024-02-29 PROBLEM — M54.50 LOW BACK PAIN, UNSPECIFIED: Status: ACTIVE | Noted: 2022-11-14

## 2024-02-29 PROBLEM — H04.123 DRY EYE SYNDROME OF BOTH EYES: Status: ACTIVE | Noted: 2021-08-23

## 2024-02-29 PROBLEM — R42 DIZZINESS AND GIDDINESS: Status: ACTIVE | Noted: 2022-11-14

## 2024-02-29 PROBLEM — H35.373 EPIRETINAL MEMBRANE (ERM) OF BOTH EYES: Status: ACTIVE | Noted: 2021-08-23

## 2024-02-29 PROBLEM — K57.92 ACUTE DIVERTICULITIS: Status: ACTIVE | Noted: 2024-01-08

## 2024-02-29 PROBLEM — M79.676 PAIN OF TOE: Status: ACTIVE | Noted: 2022-12-22

## 2024-02-29 PROBLEM — I69.328 CVA, OLD, SPEECH/LANGUAGE DEFICIT: Status: ACTIVE | Noted: 2022-12-22
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
## 2024-02-29 PROBLEM — B35.1 DERMATOPHYTOSIS OF NAIL: Status: ACTIVE | Noted: 2022-12-22

## 2024-02-29 PROBLEM — N17.9 ACUTE KIDNEY FAILURE (HCC): Status: ACTIVE | Noted: 2022-11-14

## 2024-02-29 PROBLEM — R29.90 STROKE-LIKE EPISODE: Status: ACTIVE | Noted: 2022-08-21

## 2024-02-29 PROBLEM — R23.4 CRACKED SKIN ON FEET: Status: ACTIVE | Noted: 2022-12-22

## 2024-02-29 PROCEDURE — 3077F SYST BP >= 140 MM HG: CPT | Performed by: NURSE PRACTITIONER

## 2024-02-29 PROCEDURE — 99213 OFFICE O/P EST LOW 20 MIN: CPT | Performed by: NURSE PRACTITIONER

## 2024-02-29 PROCEDURE — 3078F DIAST BP <80 MM HG: CPT | Performed by: NURSE PRACTITIONER

## 2024-02-29 RX ORDER — POLYMYXIN B SULFATE AND TRIMETHOPRIM 1; 10000 MG/ML; [USP'U]/ML
1 SOLUTION OPHTHALMIC EVERY 4 HOURS
Qty: 3 ML | Refills: 0 | Status: SHIPPED | OUTPATIENT
Start: 2024-02-29 | End: 2024-03-10

## 2024-02-29 RX ORDER — TOBRAMYCIN 3 MG/ML
SOLUTION/ DROPS OPHTHALMIC
COMMUNITY
Start: 2024-02-21

## 2024-02-29 ASSESSMENT — ENCOUNTER SYMPTOMS
SORE THROAT: 0
COUGH: 0
RHINORRHEA: 0
EYE PAIN: 1
EYE DISCHARGE: 1
EYE REDNESS: 1
EYE ITCHING: 1
PHOTOPHOBIA: 1
DOUBLE VISION: 1

## 2024-02-29 ASSESSMENT — PATIENT HEALTH QUESTIONNAIRE - PHQ9
SUM OF ALL RESPONSES TO PHQ QUESTIONS 1-9: 0
SUM OF ALL RESPONSES TO PHQ QUESTIONS 1-9: 0
1. LITTLE INTEREST OR PLEASURE IN DOING THINGS: 0
2. FEELING DOWN, DEPRESSED OR HOPELESS: 0
SUM OF ALL RESPONSES TO PHQ QUESTIONS 1-9: 0
SUM OF ALL RESPONSES TO PHQ QUESTIONS 1-9: 0
SUM OF ALL RESPONSES TO PHQ9 QUESTIONS 1 & 2: 0

## 2024-02-29 NOTE — PROGRESS NOTES
[facility]  [facility] PRIMARY CARE  [facility] include 234 beds at the [facility] CARE [place] MAIN FLOOR  [facility] [address]  Dept: [phone]  Dept Fax: [phone]    Dre Love (:  1960) is a 63 y.o. female,Established patient, here for evaluation of the following chief complaint(s):  Conjunctivitis (Patient is here today for possible pink eye )         ASSESSMENT/PLAN:  1. Acute bacterial conjunctivitis of left eye  -     trimethoprim-polymyxin b (POLYTRIM) 61928-3.1 UNIT/ML-% ophthalmic solution; Place 1 drop into the left eye every 4 hours for 10 days, Disp-3 mL, R-0Normal    Patient without improvement with tobramycin drops, will switch to Polytrim.  However I have encouraged the patient to schedule with ophthalmology if not improving over the weekend.  She is not currently established with an eye doctor, I did provide her with a few local numbers and have encouraged her to contact her insurance company to verify who is in network    Return in about 4 weeks (around 3/28/2024) for Diabetes.         Subjective   SUBJECTIVE/OBJECTIVE:  Has been on eye drops from dialysis, tobramycin for the last week.  Has not improved    Eye is red, feels like sand is in the eye.     Conjunctivitis   The current episode started 5 to 7 days ago. Associated symptoms include double vision, eye itching, photophobia, eye discharge (watery), eye pain and eye redness. Pertinent negatives include no fever, no headaches, no rhinorrhea, no sore throat, no cough and no URI. The eye pain is mild. The left eye is affected. The eye pain is associated with movement. The eyelid exhibits redness.       Review of Systems   Constitutional:  Negative for fever.   HENT:  Negative for rhinorrhea and sore throat.    Eyes:  Positive for double vision, photophobia, pain, discharge (watery), redness and itching.   Respiratory:  Negative for cough.    Neurological:  Negative for headaches.          Objective     DIAGNOSTIC

## 2024-02-29 NOTE — PATIENT INSTRUCTIONS
[facility]  [address] [facility]  [place] [address]  [phone]     [facility] [address]  [place], [place] [id number] [phone]

## 2024-04-03 DIAGNOSIS — I10 ESSENTIAL (PRIMARY) HYPERTENSION: ICD-10-CM

## 2024-04-03 RX ORDER — FUROSEMIDE 80 MG
40 TABLET ORAL 2 TIMES DAILY
Qty: 28 TABLET | Refills: 4 | Status: SHIPPED | OUTPATIENT
Start: 2024-04-03

## 2024-04-09 ENCOUNTER — TELEPHONE (OUTPATIENT)
Dept: PRIMARY CARE CLINIC | Age: 64
End: 2024-04-09

## 2024-04-09 NOTE — TELEPHONE ENCOUNTER
Care Transitions Initial Follow Up Call    Outreach made within 2 business days of discharge: Yes    Patient: Italo Hammond Patient : 1960   MRN: [id number]  Reason for Admission: There are no discharge diagnoses documented for the most recent discharge.  Discharge Date: 8/10/23       Spoke with: italo hammond     Discharge department/facility: [facility]     TCM Interactive Patient Contact:  Was patient able to fill all prescriptions: Yes  Was patient instructed to bring all medications to the follow-up visit: Yes  Is patient taking all medications as directed in the discharge summary? Yes  Does patient understand their discharge instructions: Yes  Does patient have questions or concerns that need addressed prior to 7-14 day follow up office visit: no    Scheduled appointment with PCP within 7-14 days    Follow Up  No future appointments.    Erlinda Boggs MA

## 2024-04-15 ENCOUNTER — TELEPHONE (OUTPATIENT)
Dept: GASTROENTEROLOGY | Age: 64
End: 2024-04-15

## 2024-04-15 ENCOUNTER — OFFICE VISIT (OUTPATIENT)
Dept: PRIMARY CARE CLINIC | Age: 64
End: 2024-04-15
Payer: MEDICARE

## 2024-04-15 VITALS
HEART RATE: 78 BPM | BODY MASS INDEX: 40.1 KG/M2 | SYSTOLIC BLOOD PRESSURE: 154 MMHG | DIASTOLIC BLOOD PRESSURE: 67 MMHG | OXYGEN SATURATION: 100 % | WEIGHT: 241 LBS

## 2024-04-15 DIAGNOSIS — Z09 HOSPITAL DISCHARGE FOLLOW-UP: Primary | ICD-10-CM

## 2024-04-15 DIAGNOSIS — E13.9 DIABETES MELLITUS OF OTHER TYPE WITHOUT COMPLICATION, UNSPECIFIED WHETHER LONG TERM INSULIN USE (HCC): ICD-10-CM

## 2024-04-15 DIAGNOSIS — R16.0 HEPATOMEGALY: ICD-10-CM

## 2024-04-15 DIAGNOSIS — R74.8 ABNORMAL LIVER ENZYMES: ICD-10-CM

## 2024-04-15 DIAGNOSIS — I10 ESSENTIAL HYPERTENSION: ICD-10-CM

## 2024-04-15 PROBLEM — H40.10X2 UNSPECIFIED OPEN-ANGLE GLAUCOMA, MODERATE STAGE: Status: ACTIVE | Noted: 2024-04-05

## 2024-04-15 PROBLEM — G45.9 TRANSIENT ISCHEMIC ATTACK: Status: ACTIVE | Noted: 2017-01-30

## 2024-04-15 PROCEDURE — 3077F SYST BP >= 140 MM HG: CPT | Performed by: NURSE PRACTITIONER

## 2024-04-15 PROCEDURE — 3078F DIAST BP <80 MM HG: CPT | Performed by: NURSE PRACTITIONER

## 2024-04-15 PROCEDURE — 99214 OFFICE O/P EST MOD 30 MIN: CPT | Performed by: NURSE PRACTITIONER

## 2024-04-15 PROCEDURE — 1111F DSCHRG MED/CURRENT MED MERGE: CPT | Performed by: NURSE PRACTITIONER

## 2024-04-15 RX ORDER — DORZOLAMIDE HYDROCHLORIDE AND TIMOLOL MALEATE 20; 5 MG/ML; MG/ML
1 SOLUTION/ DROPS OPHTHALMIC 2 TIMES DAILY
COMMUNITY
Start: 2024-04-07

## 2024-04-15 RX ORDER — BRIMONIDINE TARTRATE 2 MG/ML
1 SOLUTION/ DROPS OPHTHALMIC 2 TIMES DAILY
COMMUNITY
Start: 2024-04-07

## 2024-04-15 RX ORDER — ATROPINE SULFATE 10 MG/ML
1 SOLUTION/ DROPS OPHTHALMIC 2 TIMES DAILY
COMMUNITY
Start: 2024-04-07

## 2024-04-15 RX ORDER — INSULIN LISPRO 100 U/ML
INJECTION, SOLUTION SUBCUTANEOUS
COMMUNITY
Start: 2024-04-08 | End: 2024-04-15

## 2024-04-15 RX ORDER — FUROSEMIDE 40 MG/1
TABLET ORAL
COMMUNITY
Start: 2024-04-03

## 2024-04-15 NOTE — PROGRESS NOTES
signs of acute cholecystitis and enlarging liver with borderline findings for steatosis. Patient is to follow-up with her Gastroenterologist Sophy Reed MD to further evaluate and discuss her abnormal lab and ultrasound findings.    As for patient's history of ESRD secondary to diabetic nephropathy, nephrology was consulted for hemodialysis management and patient underwent hemodialysis while inpatient. Nephrology recommended adding hydralazine 25 mg oral t.i.d. for better blood pressure control. Patient is to follow up with Nephrology upon discharge.    Regarding patient's hypertensive urgency with labile blood pressure, patient required IV hydralazine 10 mg however after being resumed on home Coreg 25 mg BID, losartan 100 mg and nifedipine XL 90 mg daily, patient found improvement of her blood pressure. Patient is to continue this regimen with the addition of hydralazine upon discharge.     Interval history/Current status: stable    Patient Active Problem List   Diagnosis    Malignant neoplasm of uterus (HCC)    Acid reflux    Heart murmur    Class 3 severe obesity due to excess calories with serious comorbidity and body mass index (BMI) of 40.0 to 44.9 in adult (HCC)    Type 2 diabetes mellitus treated with insulin (HCC)    Microalbuminuria    Noncompliance with treatment    Hypokalemia    Lymphedema of leg    Primary osteoarthritis of both knees    Bad memory    Unsteady gait    Calculus of gallbladder    Weakness of left side of body    AF (amaurosis fugax)    Chronic kidney disease    Abnormal liver enzymes    Ataxic aphasia    Herpes simplex type 2 infection    Temporary cerebral vascular dysfunction    Moderate or severe vision impairment, both eyes    Familial hyperlipidemia    Essential hypertension    Occipital cerebral infarction (HCC)    Drug overdose    Hypersomnolence    Ischemic stroke (HCC)    Right hemiparesis (HCC)    Leg wound, right, initial encounter    CKD (chronic kidney disease) stage

## 2024-04-15 NOTE — PATIENT INSTRUCTIONS
Please discontinue Humulin RU-500 and instead start insulin regimen of:  Start taking: Lantus 17 units once in the morning and once at bedtime.   Humalo units with small meals, 10 units with large meals plus correction scale with each meal (for correction scale take blood glucose before meal and give amount of units indicated by scale).   Correction scale: Daytime   For blood glucose 151-200 mg/dL, give 2 units.  For blood glucose 201-250 mg/dL, give 4 units.  For blood glucose 251-300 mg/dL, give 6 units.   For blood glucose 301-350 mg/dL, give 8 units.  For blood glucose 351-400 mg/dL, give 10 units.     Please use Cosopt ophthalmic eyedrops and brimonidine ophthalmic eyedrops twice a day both eyes. Please use Atropine 1% in the left eye twice daily until follow-up with the redness specialist Dr. Lozano.  Please continue taking your medications as prescribed     - Diet & Activity -  We recommend your diet be Adult diet Regular Texture   Avoid intense physical activity until evaluated by your primary care provider.    - Follow Ups -    Follow-up with endocrinology within 1-2 weeks     Please follow-up with Gastroenterology to further evaluate and discuss her liver ultrasound findings of gallstones, hepatomegaly, hepatic steatosis.    The patient will need urgent follow-up with treating retina specialist Dr. Dharmesh Lozano upon discharge at [facility] retina physicians [phone] .

## 2024-05-02 ENCOUNTER — TELEPHONE (OUTPATIENT)
Dept: INTERNAL MEDICINE CLINIC | Age: 64
End: 2024-05-02

## 2024-05-02 NOTE — TELEPHONE ENCOUNTER
Warren from PT was calling stating that pt BP today was 160/70 one hour after medication, states pt will call back letting us know BP again

## 2024-05-23 RX ORDER — DOCUSATE SODIUM 100 MG/1
100 CAPSULE, LIQUID FILLED ORAL 2 TIMES DAILY
Qty: 56 CAPSULE | Refills: 5 | Status: SHIPPED | OUTPATIENT
Start: 2024-05-23

## 2024-05-30 ENCOUNTER — OFFICE VISIT (OUTPATIENT)
Dept: PRIMARY CARE CLINIC | Age: 64
End: 2024-05-30
Payer: MEDICARE

## 2024-05-30 VITALS
OXYGEN SATURATION: 96 % | WEIGHT: 241 LBS | BODY MASS INDEX: 40.1 KG/M2 | DIASTOLIC BLOOD PRESSURE: 62 MMHG | HEART RATE: 59 BPM | SYSTOLIC BLOOD PRESSURE: 121 MMHG

## 2024-05-30 DIAGNOSIS — H40.89 OTHER GLAUCOMA OF LEFT EYE: ICD-10-CM

## 2024-05-30 DIAGNOSIS — I10 ESSENTIAL HYPERTENSION: Primary | ICD-10-CM

## 2024-05-30 DIAGNOSIS — N18.6 END-STAGE RENAL DISEASE ON HEMODIALYSIS (HCC): ICD-10-CM

## 2024-05-30 DIAGNOSIS — E10.319: ICD-10-CM

## 2024-05-30 DIAGNOSIS — Z99.2 END-STAGE RENAL DISEASE ON HEMODIALYSIS (HCC): ICD-10-CM

## 2024-05-30 PROCEDURE — 3078F DIAST BP <80 MM HG: CPT | Performed by: NURSE PRACTITIONER

## 2024-05-30 PROCEDURE — 3074F SYST BP LT 130 MM HG: CPT | Performed by: NURSE PRACTITIONER

## 2024-05-30 PROCEDURE — 99213 OFFICE O/P EST LOW 20 MIN: CPT | Performed by: NURSE PRACTITIONER

## 2024-05-30 RX ORDER — TERAZOSIN 5 MG/1
CAPSULE ORAL
COMMUNITY
Start: 2024-05-28

## 2024-05-30 RX ORDER — HYDRALAZINE HYDROCHLORIDE 100 MG/1
TABLET, FILM COATED ORAL
COMMUNITY
Start: 2024-05-23

## 2024-05-30 RX ORDER — ATORVASTATIN CALCIUM 40 MG/1
TABLET, FILM COATED ORAL
COMMUNITY
Start: 2024-05-28

## 2024-05-30 RX ORDER — PEN NEEDLE, DIABETIC 32GX 5/32"
NEEDLE, DISPOSABLE MISCELLANEOUS
COMMUNITY
Start: 2024-05-21

## 2024-05-30 RX ORDER — NIFEDIPINE 60 MG/1
60 TABLET, EXTENDED RELEASE ORAL 2 TIMES DAILY
COMMUNITY
Start: 2024-04-30

## 2024-05-30 RX ORDER — INSULIN LISPRO 100 U/ML
INJECTION, SOLUTION SUBCUTANEOUS
COMMUNITY
Start: 2024-05-21 | End: 2024-05-30

## 2024-05-30 RX ORDER — ERYTHROMYCIN 5 MG/G
0.5 OINTMENT OPHTHALMIC 3 TIMES DAILY
COMMUNITY
Start: 2024-04-22 | End: 2024-05-30 | Stop reason: ALTCHOICE

## 2024-05-30 RX ORDER — POLYMYXIN B SULFATE AND TRIMETHOPRIM 1; 10000 MG/ML; [USP'U]/ML
SOLUTION OPHTHALMIC
COMMUNITY
Start: 2024-04-27 | End: 2024-05-30 | Stop reason: ALTCHOICE

## 2024-05-30 NOTE — PATIENT INSTRUCTIONS
Neovascular Glaucoma LEFT EYE   - will recommend she continue with topical glaucoma drops ( Cosopt LEFT EYE BID, Brimonidine LEFT EYE BID, Atropine LEFT EYE at bedtime)

## 2024-05-30 NOTE — PROGRESS NOTES
[facility]  [facility] PRIMARY CARE  [facility] MAIN FLOOR  [facility] [address]  Dept: [phone]  Dept Fax: [phone]    Dre Love (:  1960) is a 63 y.o. female,Established patient, here for evaluation of the following chief complaint(s):  Hypertension, Diabetes, and Follow-up (6 week follow up )    Dre Love is a 63-year-old female here today for follow-up, she is a complicated history with multiple medical problems including stroke, diabetes, and glaucoma.  She follows with endocrinology for her type 1 diabetes and is established with an ophthalmologist for glaucoma and diabetic retinopathy.  Since our last visit she has had 2 hospital stays and ER visits, 1 on 2024 and again on 2024.  Both at [facility].  She presented to the  with shortness of breath and hypertension.  Patient is on dialysis for end-stage renal disease.  Medication discharge including atorvastatin 40 mg, increasing hydralazine to 100 mg 3 times a day and nifedipine to 60 mg morning and bedtime.  Insulin was adjusted to 15 units twice daily with the option to increase to 70 units twice daily based on home readings.      Assessment & Plan   ASSESSMENT/PLAN:  1. Essential hypertension  2. End-stage renal disease on hemodialysis (HCC)  3. Diabetic retinopathy of left eye associated with type 1 diabetes mellitus, macular edema presence unspecified, unspecified retinopathy severity (HCC)  4. Other glaucoma of left eye    Heavily reviewed discharge medication from April [facility] hospital stay, unfortunately patient has pill packs that were dispensed on April 3.  She endorses that she does have a medication pack at home that includes afternoon pills and she is been taking medication from that pack.  The pill pack that she has with her today does not include any afternoon doses.  Based on her reported medication history it appears she is taking hydralazine 3 times a

## 2024-09-06 LAB — DIABETIC RETINOPATHY: POSITIVE

## 2024-11-08 RX ORDER — DOCUSATE SODIUM 100 MG/1
100 CAPSULE, LIQUID FILLED ORAL 2 TIMES DAILY
Qty: 60 CAPSULE | Refills: 0 | Status: SHIPPED | OUTPATIENT
Start: 2024-11-08 | End: 2024-12-08

## 2024-11-14 ENCOUNTER — OFFICE VISIT (OUTPATIENT)
Dept: PRIMARY CARE CLINIC | Age: 64
End: 2024-11-14

## 2024-11-14 VITALS
WEIGHT: 250 LBS | OXYGEN SATURATION: 98 % | HEART RATE: 70 BPM | SYSTOLIC BLOOD PRESSURE: 183 MMHG | BODY MASS INDEX: 41.6 KG/M2 | TEMPERATURE: 97.9 F | DIASTOLIC BLOOD PRESSURE: 67 MMHG

## 2024-11-14 DIAGNOSIS — E11.3513 PROLIFERATIVE DIABETIC RETINOPATHY OF BOTH EYES WITH MACULAR EDEMA ASSOCIATED WITH TYPE 2 DIABETES MELLITUS (HCC): ICD-10-CM

## 2024-11-14 DIAGNOSIS — G81.91 RIGHT HEMIPARESIS (HCC): ICD-10-CM

## 2024-11-14 DIAGNOSIS — N39.41 URGE INCONTINENCE: ICD-10-CM

## 2024-11-14 DIAGNOSIS — I63.9 ISCHEMIC STROKE (HCC): ICD-10-CM

## 2024-11-14 DIAGNOSIS — Z12.31 BREAST CANCER SCREENING BY MAMMOGRAM: ICD-10-CM

## 2024-11-14 DIAGNOSIS — I77.0 AVF (ARTERIOVENOUS FISTULA) (HCC): ICD-10-CM

## 2024-11-14 DIAGNOSIS — D80.8 LIGHT CHAIN DISEASE (HCC): ICD-10-CM

## 2024-11-14 DIAGNOSIS — E78.00 HYPERCHOLESTEREMIA: ICD-10-CM

## 2024-11-14 DIAGNOSIS — R80.9 MICROALBUMINURIA: Primary | ICD-10-CM

## 2024-11-14 DIAGNOSIS — E78.1 HYPERTRIGLYCERIDEMIA: ICD-10-CM

## 2024-11-14 DIAGNOSIS — Z23 NEED FOR VACCINATION: ICD-10-CM

## 2024-11-14 DIAGNOSIS — I10 ESSENTIAL HYPERTENSION: ICD-10-CM

## 2024-11-14 RX ORDER — UNDERPADS 23" X 36"
1 EACH MISCELLANEOUS
Qty: 200 EACH | Refills: 2 | Status: SHIPPED | OUTPATIENT
Start: 2024-11-14 | End: 2024-12-14

## 2024-11-14 SDOH — ECONOMIC STABILITY: FOOD INSECURITY: WITHIN THE PAST 12 MONTHS, THE FOOD YOU BOUGHT JUST DIDN'T LAST AND YOU DIDN'T HAVE MONEY TO GET MORE.: NEVER TRUE

## 2024-11-14 SDOH — ECONOMIC STABILITY: INCOME INSECURITY: HOW HARD IS IT FOR YOU TO PAY FOR THE VERY BASICS LIKE FOOD, HOUSING, MEDICAL CARE, AND HEATING?: NOT VERY HARD

## 2024-11-14 SDOH — ECONOMIC STABILITY: FOOD INSECURITY: WITHIN THE PAST 12 MONTHS, YOU WORRIED THAT YOUR FOOD WOULD RUN OUT BEFORE YOU GOT MONEY TO BUY MORE.: NEVER TRUE

## 2024-11-14 ASSESSMENT — ENCOUNTER SYMPTOMS
EYE ITCHING: 0
BACK PAIN: 0
VOMITING: 0
COUGH: 0
NAUSEA: 0
TROUBLE SWALLOWING: 0
SHORTNESS OF BREATH: 1
PHOTOPHOBIA: 0
DIARRHEA: 0
BLOOD IN STOOL: 0
WHEEZING: 0
CONSTIPATION: 0

## 2024-11-14 NOTE — ASSESSMENT & PLAN NOTE
Chronic, at goal (stable), continue current treatment plan    Orders:    Incontinence Supply Disposable (INCONTINENCE BRIEF LARGE) MISC; 1 each by Does not apply route every 4-6 hours as needed (incontinenece)

## 2024-11-14 NOTE — PROGRESS NOTES
presents today for routine follow-up with PCP    States she is following routinely with her ophthalmologist and going on Monday Wednesday and Friday for dialysis.    States blood pressure readings at home are anywhere from 150-180 systolic.  She is been advised by her nephrologist to go to the emergency room her blood pressures are over 180 systolic.  Has her pill pack with her and is compliant with hydralazine and other blood pressure medications    The patient is requesting incontinence appliance today, states she is running low.        Review of Systems   Constitutional:  Negative for chills and fever.   HENT:  Negative for congestion and trouble swallowing.    Eyes:  Negative for photophobia and itching.   Respiratory:  Positive for shortness of breath (on exertion). Negative for cough and wheezing.    Cardiovascular:  Positive for leg swelling. Negative for chest pain.   Gastrointestinal:  Negative for blood in stool, constipation, diarrhea, nausea and vomiting.   Genitourinary:  Negative for difficulty urinating, dysuria, frequency and urgency.   Musculoskeletal:  Positive for arthralgias. Negative for back pain, myalgias and neck pain.   Neurological:  Negative for dizziness, tremors, syncope and headaches.          Objective   Physical Exam  Vitals reviewed.   Constitutional:       Appearance: Normal appearance. She is well-developed and well-groomed. She is obese.   HENT:      Head: Normocephalic and atraumatic.      Right Ear: External ear normal.      Left Ear: External ear normal.      Nose: Nose normal.   Eyes:      General: Lids are normal. No scleral icterus.     Conjunctiva/sclera: Conjunctivae normal.      Pupils: Pupils are equal, round, and reactive to light.   Cardiovascular:      Rate and Rhythm: Normal rate and regular rhythm.      Heart sounds: Murmur heard.      Systolic murmur is present with a grade of 1/6.   Pulmonary:      Effort: Pulmonary effort is normal.      Breath sounds: Normal

## 2024-11-14 NOTE — PATIENT INSTRUCTIONS
[facility]  Phone number: [phone]    Subsidized Apartment Complexes in [facility]  [address] [address]  [phone]    [facility]  [address] [address]  [phone]    [facility]  [address] [address]  [phone]    [address]  [address] [address]  [phone]    [facility] [place]  [address] [address]  [phone]    [place]  [address] [address]  [phone]

## 2025-01-03 RX ORDER — DOCUSATE SODIUM 100 MG/1
100 CAPSULE, LIQUID FILLED ORAL 2 TIMES DAILY
Qty: 56 CAPSULE | Refills: 2 | Status: SHIPPED | OUTPATIENT
Start: 2025-01-03 | End: 2025-03-28

## 2025-02-06 ENCOUNTER — TELEPHONE (OUTPATIENT)
Dept: PRIMARY CARE CLINIC | Age: 65
End: 2025-02-06

## 2025-02-06 DIAGNOSIS — N18.6 END-STAGE RENAL DISEASE ON HEMODIALYSIS (HCC): ICD-10-CM

## 2025-02-06 DIAGNOSIS — E11.3513 PROLIFERATIVE DIABETIC RETINOPATHY OF BOTH EYES WITH MACULAR EDEMA ASSOCIATED WITH TYPE 2 DIABETES MELLITUS (HCC): Primary | ICD-10-CM

## 2025-02-06 DIAGNOSIS — Z99.2 END-STAGE RENAL DISEASE ON HEMODIALYSIS (HCC): ICD-10-CM

## 2025-02-06 DIAGNOSIS — G81.91 RIGHT HEMIPARESIS (HCC): ICD-10-CM

## 2025-02-06 NOTE — TELEPHONE ENCOUNTER
[facility] called in and wanted to know if this pt can get a referral for OT, PT and SN. Please advise

## 2025-03-20 ENCOUNTER — OFFICE VISIT (OUTPATIENT)
Dept: PRIMARY CARE CLINIC | Age: 65
End: 2025-03-20

## 2025-03-20 VITALS
DIASTOLIC BLOOD PRESSURE: 67 MMHG | SYSTOLIC BLOOD PRESSURE: 172 MMHG | WEIGHT: 219 LBS | BODY MASS INDEX: 36.44 KG/M2 | HEART RATE: 71 BPM | OXYGEN SATURATION: 100 % | TEMPERATURE: 98.1 F

## 2025-03-20 DIAGNOSIS — C55 MALIGNANT NEOPLASM OF UTERUS, UNSPECIFIED SITE (HCC): ICD-10-CM

## 2025-03-20 DIAGNOSIS — G89.29 CHRONIC RLQ PAIN: ICD-10-CM

## 2025-03-20 DIAGNOSIS — E11.3513 PROLIFERATIVE DIABETIC RETINOPATHY OF BOTH EYES WITH MACULAR EDEMA ASSOCIATED WITH TYPE 2 DIABETES MELLITUS: ICD-10-CM

## 2025-03-20 DIAGNOSIS — Z99.2 END-STAGE RENAL DISEASE ON HEMODIALYSIS (HCC): ICD-10-CM

## 2025-03-20 DIAGNOSIS — N18.6 END-STAGE RENAL DISEASE ON HEMODIALYSIS (HCC): ICD-10-CM

## 2025-03-20 DIAGNOSIS — R10.31 CHRONIC RLQ PAIN: ICD-10-CM

## 2025-03-20 DIAGNOSIS — Z00.00 INITIAL MEDICARE ANNUAL WELLNESS VISIT: Primary | ICD-10-CM

## 2025-03-20 DIAGNOSIS — G81.91 RIGHT HEMIPARESIS (HCC): ICD-10-CM

## 2025-03-20 SDOH — ECONOMIC STABILITY: FOOD INSECURITY: WITHIN THE PAST 12 MONTHS, THE FOOD YOU BOUGHT JUST DIDN'T LAST AND YOU DIDN'T HAVE MONEY TO GET MORE.: NEVER TRUE

## 2025-03-20 SDOH — ECONOMIC STABILITY: FOOD INSECURITY: WITHIN THE PAST 12 MONTHS, YOU WORRIED THAT YOUR FOOD WOULD RUN OUT BEFORE YOU GOT MONEY TO BUY MORE.: NEVER TRUE

## 2025-03-20 ASSESSMENT — PATIENT HEALTH QUESTIONNAIRE - PHQ9
SUM OF ALL RESPONSES TO PHQ QUESTIONS 1-9: 0
SUM OF ALL RESPONSES TO PHQ QUESTIONS 1-9: 0
1. LITTLE INTEREST OR PLEASURE IN DOING THINGS: NOT AT ALL
SUM OF ALL RESPONSES TO PHQ QUESTIONS 1-9: 0
SUM OF ALL RESPONSES TO PHQ QUESTIONS 1-9: 0
2. FEELING DOWN, DEPRESSED OR HOPELESS: NOT AT ALL

## 2025-03-20 ASSESSMENT — LIFESTYLE VARIABLES
HOW OFTEN DO YOU HAVE A DRINK CONTAINING ALCOHOL: NEVER
HOW MANY STANDARD DRINKS CONTAINING ALCOHOL DO YOU HAVE ON A TYPICAL DAY: PATIENT DOES NOT DRINK

## 2025-03-20 NOTE — PROGRESS NOTES
Detail Level: Simple Physician (Ophthalmology)     Recommendations for Preventive Services Due: see orders and patient instructions/AVS.  Recommended screening schedule for the next 5-10 years is provided to the patient in written form: see Patient Instructions/AVS.     Reviewed and updated this visit:  Tobacco  Allergies  Meds  Sexual Hx                  The patient (or guardian, if applicable) and other individuals in attendance with the patient were advised that Artificial Intelligence will be utilized during this visit to record and process the conversation to generate a clinical note. The patient (or guardian, if applicable) and other individuals in attendance at the appointment consented to the use of AI, including the recording.       Quality 137: Melanoma: Continuity Of Care - Recall System: Patient information entered into a recall system that includes: target date for the next exam specified AND a process to follow up with patients regarding missed or unscheduled appointments Hide Additional Notes?: No Detail Level: Generalized

## 2025-03-20 NOTE — PATIENT INSTRUCTIONS
for changes in your health, and be sure to contact your doctor if you have any problems.  Where can you learn more?  Go to https://www.healthtuul.net/patientEd and enter F075 to learn more about \"A Healthy Heart: Care Instructions.\"  Current as of: July 31, 2024  Content Version: 14.4  © 3667-2748 [employer].   Care instructions adapted under license by [employer]. If you have questions about a medical condition or this instruction, always ask your healthcare professional. Your.MD, [employer], disclaims any warranty or liability for your use of this information.    Personalized Preventive Plan for Dre Love - 3/20/2025  Medicare offers a range of preventive health benefits. Some of the tests and screenings are paid in full while other may be subject to a deductible, co-insurance, and/or copay.  Some of these benefits include a comprehensive review of your medical history including lifestyle, illnesses that may run in your family, and various assessments and screenings as appropriate.  After reviewing your medical record and screening and assessments performed today your provider may have ordered immunizations, labs, imaging, and/or referrals for you.  A list of these orders (if applicable) as well as your Preventive Care list are included within your After Visit Summary for your review.

## 2025-03-27 ENCOUNTER — CARE COORDINATION (OUTPATIENT)
Dept: CARE COORDINATION | Age: 65
End: 2025-03-27

## 2025-03-27 NOTE — CARE COORDINATION
Ambulatory Care Coordination Note     3/27/2025 2:37 PM     Patient outreach attempt by this ACM today to offer care management services. ACM was unable to reach the patient by telephone today;   voicemail full and unable to leave a message.      ACM: Derick Carrington, RN     Care Summary Note: called, unable to leave message  Letter mailed  My Chart Last used 4/14/2014    PCP/Specialist follow up:   Future Appointments         Provider Specialty Dept Phone    6/24/2025 1:30 PM Sierra Tello, APRN - CNP Primary Care [phone]            Follow Up:   Plan for next ACM outreach in approximately 1 week to complete:  - outreach attempt to offer care management services.

## 2025-04-07 ENCOUNTER — CARE COORDINATION (OUTPATIENT)
Dept: CARE COORDINATION | Age: 65
End: 2025-04-07

## 2025-04-10 ENCOUNTER — CARE COORDINATION (OUTPATIENT)
Dept: CARE COORDINATION | Age: 65
End: 2025-04-10

## 2025-04-10 NOTE — CARE COORDINATION
Ambulatory Care Coordination Note     4/10/2025 9:27 AM     Patient outreach attempt by this ACM today to offer care management services. ACM was unable to reach the patient by telephone today;   voicemail full and unable to leave a message.      ACM: Derick Carrington RN     Care Summary Note: unable to leave message.  This is 3rd attempt. Will reach out in 1 week. If unable to contact will remove from needs to enroll    PCP/Specialist follow up:   Future Appointments         Provider Specialty Dept Phone    6/24/2025 1:30 PM Sierra Tello, APRN - CNP Primary Care [phone]            Follow Up:   Plan for next ACM outreach in approximately 1 week to complete:  - outreach attempt to offer care management services.

## 2025-04-18 ENCOUNTER — CARE COORDINATION (OUTPATIENT)
Dept: INTERNAL MEDICINE | Age: 65
End: 2025-04-18

## 2025-04-18 NOTE — CARE COORDINATION
ACM contacted the patient by telephone. Verified name and  with patient as identifiers.     Patient closed (unable to reach patient) from the High Risk Care Management program on 2025.  Patient  UTR .  Care management goals have been completed. No further Ambulatory Care Manager follow up scheduled.    Letter mailed 3/27/25  No My Chart

## 2025-04-22 RX ORDER — DOCUSATE SODIUM 100 MG/1
100 CAPSULE, LIQUID FILLED ORAL 2 TIMES DAILY
Qty: 60 CAPSULE | Refills: 3 | Status: SHIPPED | OUTPATIENT
Start: 2025-04-22 | End: 2026-04-22

## 2025-04-24 PROBLEM — N18.9 CHRONIC KIDNEY DISEASE: Status: RESOLVED | Noted: 2017-08-03 | Resolved: 2025-04-24

## 2025-04-24 PROBLEM — G47.10 HYPERSOMNOLENCE: Status: RESOLVED | Noted: 2017-09-13 | Resolved: 2025-04-24

## 2025-04-24 PROBLEM — I63.9 OCCIPITAL CEREBRAL INFARCTION (HCC): Status: RESOLVED | Noted: 2017-09-13 | Resolved: 2025-04-24

## 2025-04-24 PROBLEM — I63.9 ISCHEMIC STROKE (HCC): Status: RESOLVED | Noted: 2017-09-13 | Resolved: 2025-04-24

## 2025-04-24 PROBLEM — R23.4 CRACKED SKIN ON FEET: Status: RESOLVED | Noted: 2022-12-22 | Resolved: 2025-04-24

## 2025-04-24 PROBLEM — S81.801A LEG WOUND, RIGHT, INITIAL ENCOUNTER: Status: RESOLVED | Noted: 2022-01-24 | Resolved: 2025-04-24

## 2025-04-24 PROBLEM — Z99.2 TYPE 1 DIABETES MELLITUS WITH CHRONIC KIDNEY DISEASE ON CHRONIC DIALYSIS (HCC): Status: ACTIVE | Noted: 2024-08-29

## 2025-04-24 PROBLEM — T50.901A DRUG OVERDOSE: Status: RESOLVED | Noted: 2017-10-04 | Resolved: 2025-04-24

## 2025-04-24 PROBLEM — E10.22 TYPE 1 DIABETES MELLITUS WITH CHRONIC KIDNEY DISEASE ON CHRONIC DIALYSIS (HCC): Status: ACTIVE | Noted: 2024-08-29

## 2025-04-24 PROBLEM — R41.82 ALTERED MENTAL STATUS: Status: RESOLVED | Noted: 2022-10-28 | Resolved: 2025-04-24

## 2025-04-24 PROBLEM — E10.42 DIABETIC POLYNEUROPATHY ASSOCIATED WITH TYPE 1 DIABETES MELLITUS (HCC): Status: ACTIVE | Noted: 2024-08-29

## 2025-04-24 PROBLEM — H43.393 VITREOUS SYNERESIS OF BOTH EYES: Status: RESOLVED | Noted: 2021-08-23 | Resolved: 2025-04-24

## 2025-04-24 PROBLEM — R47.01: Status: RESOLVED | Noted: 2017-05-07 | Resolved: 2025-04-24

## 2025-04-24 PROBLEM — E11.3513 PROLIFERATIVE DIABETIC RETINOPATHY OF BOTH EYES WITH MACULAR EDEMA ASSOCIATED WITH TYPE 2 DIABETES MELLITUS (HCC): Status: RESOLVED | Noted: 2021-08-23 | Resolved: 2025-04-24

## 2025-04-24 PROBLEM — E10.22 TYPE 1 DIABETES MELLITUS WITH CHRONIC KIDNEY DISEASE ON CHRONIC DIALYSIS (HCC): Status: ACTIVE | Noted: 2025-04-24

## 2025-04-24 PROBLEM — E10.42 DIABETIC POLYNEUROPATHY ASSOCIATED WITH TYPE 1 DIABETES MELLITUS (HCC): Status: ACTIVE | Noted: 2025-04-24

## 2025-04-24 PROBLEM — E11.65 TYPE 2 DIABETES MELLITUS WITH HYPERGLYCEMIA (HCC): Status: RESOLVED | Noted: 2023-08-10 | Resolved: 2025-04-24
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
## 2025-04-24 PROBLEM — E10.3593 TYPE 1 DIABETES MELLITUS WITH PROLIFERATIVE RETINOPATHY OF BOTH EYES (HCC): Status: ACTIVE | Noted: 2024-08-29

## 2025-04-24 PROBLEM — N18.6 TYPE 1 DIABETES MELLITUS WITH CHRONIC KIDNEY DISEASE ON CHRONIC DIALYSIS (HCC): Status: ACTIVE | Noted: 2025-04-24

## 2025-04-24 PROBLEM — I67.82 TEMPORARY CEREBRAL VASCULAR DYSFUNCTION: Status: RESOLVED | Noted: 2017-07-19 | Resolved: 2025-04-24

## 2025-04-24 PROBLEM — I69.328 CVA, OLD, SPEECH/LANGUAGE DEFICIT: Status: RESOLVED | Noted: 2022-12-22 | Resolved: 2025-04-24

## 2025-04-24 PROBLEM — Z99.2 TYPE 1 DIABETES MELLITUS WITH CHRONIC KIDNEY DISEASE ON CHRONIC DIALYSIS (HCC): Status: ACTIVE | Noted: 2025-04-24

## 2025-04-24 PROBLEM — G45.3 AF (AMAUROSIS FUGAX): Status: RESOLVED | Noted: 2017-01-30 | Resolved: 2025-04-24

## 2025-04-24 PROBLEM — I95.1 ORTHOSTATIC HYPOTENSION: Status: RESOLVED | Noted: 2022-11-06 | Resolved: 2025-04-24

## 2025-04-24 PROBLEM — R74.8 ABNORMAL LIVER ENZYMES: Status: RESOLVED | Noted: 2017-08-16 | Resolved: 2025-04-24

## 2025-04-24 PROBLEM — N18.6 END STAGE RENAL DISEASE (HCC): Status: RESOLVED | Noted: 2022-11-10 | Resolved: 2025-04-24

## 2025-04-24 PROBLEM — R29.90 STROKE-LIKE EPISODE: Status: RESOLVED | Noted: 2022-08-21 | Resolved: 2025-04-24

## 2025-04-24 PROBLEM — K57.92 ACUTE DIVERTICULITIS: Status: RESOLVED | Noted: 2024-01-08 | Resolved: 2025-04-24

## 2025-04-24 PROBLEM — G45.9 TRANSIENT ISCHEMIC ATTACK: Status: RESOLVED | Noted: 2017-01-30 | Resolved: 2025-04-24

## 2025-04-24 PROBLEM — R53.1 WEAKNESS OF LEFT SIDE OF BODY: Status: RESOLVED | Noted: 2017-01-30 | Resolved: 2025-04-24

## 2025-04-24 PROBLEM — B35.1 DERMATOPHYTOSIS OF NAIL: Status: RESOLVED | Noted: 2022-12-22 | Resolved: 2025-04-24

## 2025-04-24 PROBLEM — N18.6 TYPE 1 DIABETES MELLITUS WITH CHRONIC KIDNEY DISEASE ON CHRONIC DIALYSIS (HCC): Status: ACTIVE | Noted: 2024-08-29

## 2025-04-24 PROBLEM — K80.20 CALCULUS OF GALLBLADDER: Status: RESOLVED | Noted: 2017-01-30 | Resolved: 2025-04-24

## 2025-04-24 PROBLEM — M54.50 LOW BACK PAIN, UNSPECIFIED: Status: RESOLVED | Noted: 2022-11-14 | Resolved: 2025-04-24

## 2025-04-24 PROBLEM — M79.676 PAIN OF TOE: Status: RESOLVED | Noted: 2022-12-22 | Resolved: 2025-04-24

## 2025-04-24 PROBLEM — I95.9 HYPOTENSION, UNSPECIFIED: Status: RESOLVED | Noted: 2022-11-08 | Resolved: 2025-04-24

## 2025-04-24 PROBLEM — R42 DIZZINESS AND GIDDINESS: Status: RESOLVED | Noted: 2022-11-14 | Resolved: 2025-04-24

## 2025-04-24 PROBLEM — N17.9 ACUTE KIDNEY FAILURE: Status: RESOLVED | Noted: 2022-11-14 | Resolved: 2025-04-24

## 2025-04-24 PROBLEM — B00.9 HERPES SIMPLEX TYPE 2 INFECTION: Status: RESOLVED | Noted: 2017-08-15 | Resolved: 2025-04-24

## 2025-07-21 RX ORDER — DOCUSATE SODIUM 100 MG/1
100 CAPSULE, LIQUID FILLED ORAL 2 TIMES DAILY
Qty: 60 CAPSULE | Refills: 3 | Status: SHIPPED | OUTPATIENT
Start: 2025-07-21 | End: 2026-07-21